# Patient Record
Sex: MALE | Race: WHITE | Employment: UNEMPLOYED | URBAN - NONMETROPOLITAN AREA
[De-identification: names, ages, dates, MRNs, and addresses within clinical notes are randomized per-mention and may not be internally consistent; named-entity substitution may affect disease eponyms.]

---

## 2017-12-31 ENCOUNTER — HOSPITAL ENCOUNTER (EMERGENCY)
Age: 57
Discharge: HOME | End: 2017-12-31
Attending: EMERGENCY MEDICINE
Payer: MEDICAID

## 2017-12-31 VITALS
SYSTOLIC BLOOD PRESSURE: 137 MMHG | OXYGEN SATURATION: 98 % | HEART RATE: 79 BPM | RESPIRATION RATE: 18 BRPM | DIASTOLIC BLOOD PRESSURE: 87 MMHG | TEMPERATURE: 98.8 F

## 2017-12-31 DIAGNOSIS — T50.905A ADVERSE EFFECT OF DRUG, INITIAL ENCOUNTER: Primary | ICD-10-CM

## 2017-12-31 DIAGNOSIS — L03.113 CELLULITIS OF RIGHT UPPER EXTREMITY: ICD-10-CM

## 2017-12-31 PROCEDURE — 6370000000 HC RX 637 (ALT 250 FOR IP): Performed by: EMERGENCY MEDICINE

## 2017-12-31 PROCEDURE — 99282 EMERGENCY DEPT VISIT SF MDM: CPT

## 2017-12-31 RX ORDER — CEPHALEXIN 500 MG/1
500 CAPSULE ORAL 4 TIMES DAILY
Qty: 28 CAPSULE | Refills: 0 | Status: SHIPPED | OUTPATIENT
Start: 2017-12-31 | End: 2018-01-07

## 2017-12-31 RX ORDER — CEPHALEXIN 500 MG/1
500 CAPSULE ORAL ONCE
Status: COMPLETED | OUTPATIENT
Start: 2017-12-31 | End: 2017-12-31

## 2017-12-31 RX ADMIN — CEPHALEXIN 500 MG: 500 CAPSULE ORAL at 20:46

## 2018-01-01 NOTE — ED PROVIDER NOTES
eMERGENCY dEPARTMENT eNCOUnter      [facility]    Chief Complaint   Patient presents with    Rash       HPI    Dereck Dey is a 59 y.o. male who presents  Above-noted complaint. Patient presents with rash. Subacute get some redness but he states he scratch or cut himself on the right arm. Subsequently got infected and red. He subsequently no placed on Bactrim. Feels like the arms don't look better but now a scattered rash and gets some itching all over. He tends to be nervous although does describe some rash. PAST MEDICAL HISTORY    Past Medical History:   Diagnosis Date    Angina at rest [facility])     intermittent    Anxiety     Anxiety     Asperger's disorder     Asthma     Cervical radiculopathy     Depression     Depression     Gall stones     GERD (gastroesophageal reflux disease)     Hyperlipidemia     Hypertension     Seizures (HCC)     Tremor        SURGICAL HISTORY    Past Surgical History:   Procedure Laterality Date    ABDOMEN SURGERY      CARDIAC SURGERY      Heart Cath    CHOLECYSTECTOMY  2002    CHOLECYSTECTOMY, LAPAROSCOPIC      COLONOSCOPY      COLONOSCOPY      DILATATION, ESOPHAGUS      ENDOSCOPY, COLON, DIAGNOSTIC      ESOPHAGEAL DILATATION      KNEE ARTHROSCOPY Left 1984    NECK SURGERY  1996    plate in neck and fusion    TURP  2/11/2016    UPPER GASTROINTESTINAL ENDOSCOPY         CURRENT MEDICATIONS    Current Outpatient Rx   Medication Sig Dispense Refill    cephALEXin (KEFLEX) 500 MG capsule Take 1 capsule by mouth 4 times daily for 7 days 28 capsule 0    HYDROcodone-acetaminophen (NORCO) 5-325 MG per tablet Take 1 tablet by mouth every 6 hours as needed for Pain .       naproxen (NAPROSYN) 500 MG tablet Take 1 tablet by mouth 2 times daily 30 tablet 0    Mirabegron ER (MYRBETRIQ) 25 MG TB24 Take 1 tablet by mouth daily 30 tablet 6    aspirin 81 MG tablet Take 81 mg by mouth daily      clopidogrel (PLAVIX) 75 MG tablet Take 75 mg by mouth daily Keflex over-the-counter Benadryl for itching    FINAL IMPRESSION    1. Adverse effect of drug, initial encounter    2.  Cellulitis of right upper extremity         PATIENT REFERRED TO:  Cali Francisco MD  [address] [facility], [place]    Call   For evaluation      DISCHARGE MEDICATIONS:  New Prescriptions    CEPHALEXIN (KEFLEX) 500 MG CAPSULE    Take 1 capsule by mouth 4 times daily for 7 days          Nicholas Pierce MD  12/31/17 2024

## 2018-07-30 ENCOUNTER — HOSPITAL ENCOUNTER (OUTPATIENT)
Age: 58
Discharge: HOME | End: 2018-07-30
Payer: MEDICAID

## 2018-07-30 ENCOUNTER — HOSPITAL ENCOUNTER (OUTPATIENT)
Dept: MRI IMAGING | Age: 58
Discharge: HOME | End: 2018-07-30
Payer: MEDICAID

## 2018-07-30 DIAGNOSIS — M47.812 OSTEOARTHRITIS OF CERVICAL SPINE, UNSPECIFIED SPINAL OSTEOARTHRITIS COMPLICATION STATUS: ICD-10-CM

## 2018-07-30 PROCEDURE — 72141 MRI NECK SPINE W/O DYE: CPT

## 2018-12-23 ENCOUNTER — HOSPITAL ENCOUNTER (EMERGENCY)
Age: 58
Discharge: HOME | End: 2018-12-23
Attending: FAMILY MEDICINE
Payer: MEDICAID

## 2018-12-23 ENCOUNTER — APPOINTMENT (OUTPATIENT)
Dept: GENERAL RADIOLOGY | Age: 58
End: 2018-12-23
Payer: MEDICAID

## 2018-12-23 VITALS
RESPIRATION RATE: 18 BRPM | HEART RATE: 102 BPM | SYSTOLIC BLOOD PRESSURE: 148 MMHG | TEMPERATURE: 99 F | DIASTOLIC BLOOD PRESSURE: 74 MMHG | OXYGEN SATURATION: 98 % | BODY MASS INDEX: 25.2 KG/M2 | HEIGHT: 71 IN | WEIGHT: 180 LBS

## 2018-12-23 DIAGNOSIS — J40 BRONCHITIS: Primary | ICD-10-CM

## 2018-12-23 PROCEDURE — 71046 X-RAY EXAM CHEST 2 VIEWS: CPT

## 2018-12-23 PROCEDURE — 99283 EMERGENCY DEPT VISIT LOW MDM: CPT

## 2018-12-23 RX ORDER — PREDNISONE 10 MG/1
TABLET ORAL
Qty: 20 TABLET | Refills: 0 | Status: SHIPPED | OUTPATIENT
Start: 2018-12-23 | End: 2019-01-02

## 2018-12-23 RX ORDER — AZITHROMYCIN 250 MG/1
TABLET, FILM COATED ORAL
Qty: 1 PACKET | Refills: 0 | Status: SHIPPED | OUTPATIENT
Start: 2018-12-23 | End: 2019-01-02

## 2018-12-24 ASSESSMENT — ENCOUNTER SYMPTOMS
SORE THROAT: 0
NAUSEA: 0
EYE DISCHARGE: 0
ABDOMINAL PAIN: 0
CHEST TIGHTNESS: 0
SINUS PRESSURE: 0
BACK PAIN: 0
DIARRHEA: 0
STRIDOR: 0
SHORTNESS OF BREATH: 0
EYE PAIN: 0
VOMITING: 0
RHINORRHEA: 0
ABDOMINAL DISTENTION: 0
EYE REDNESS: 0
COUGH: 1
WHEEZING: 1
PHOTOPHOBIA: 0
CONSTIPATION: 0

## 2018-12-24 NOTE — ED PROVIDER NOTES
cranial nerve deficit. Coordination normal.   Skin: Skin is warm and dry. No rash noted. He is not diaphoretic. Psychiatric: He has a normal mood and affect. Judgment and thought content normal.   Nursing note and vitals reviewed. DIFFERENTIAL DIAGNOSIS:   Bronchitis,pneumonia,copd exacerbation,    DIAGNOSTIC RESULTS     EKG: All EKG's are interpreted by the Emergency Department Physician who either signs or Co-signs this chart in the absence of a cardiologist.      RADIOLOGY: non-plain film images(s) such as CT, Ultrasound and MRI are read by the radiologist.  Imaging Results         XR CHEST STANDARD (2 VW) (Final result)   Result time 12/23/18 11:15:34   Final result by Mahin Roberts MD (12/23/18 11:15:34)                Impression:    No acute disease. **This report has been created using voice recognition software. It may contain minor errors which are inherent in voice recognition technology. **    Final report electronically signed by Dr. Shanika Packer on 12/23/2018 11:15 AM            Narrative:    PROCEDURE: XR CHEST (2 VW)    CLINICAL INFORMATION: cough, . COMPARISON: Multiple previous most recent 12/22/2015    TECHNIQUE: PA and lateral views the chest.    FINDINGS: Heart size is normal. Previous cervical spine surgery. Mild S-shaped thoracolumbar scoliosis. No pleural effusions. No acute infiltrate. Mild compression of T11 appears stable. LABS:   Labs Reviewed - No data to display    EMERGENCY DEPARTMENT COURSE:   Vitals:    Vitals:    12/23/18 1015   BP: (!) 148/74   Pulse: 102   Resp: 18   Temp: 99 °F (37.2 °C)   TempSrc: Oral   SpO2: 98%   Weight: 180 lb (81.6 kg)   Height: 5' 11\" (1.803 m)     On exam lungs clear. Chest x ray shows no acute cardiopulmonary processes. Patient notes cough. Will cover with steroids and have patient increase nebulizer treatments at home. Will also add Zithromax secondary to suggestion of copd history.  Care instructions

## 2019-02-07 ENCOUNTER — HOSPITAL ENCOUNTER (OUTPATIENT)
Age: 59
Discharge: HOME | End: 2019-02-07
Payer: MEDICAID

## 2019-02-07 LAB
ALBUMIN SERPL-MCNC: 4.5 G/DL (ref 3.5–5.1)
ALP BLD-CCNC: 100 U/L (ref 38–126)
ALT SERPL-CCNC: 14 U/L (ref 11–66)
ANION GAP SERPL CALCULATED.3IONS-SCNC: 11 MEQ/L (ref 8–16)
AST SERPL-CCNC: 18 U/L (ref 5–40)
BASOPHILS # BLD: 1.1 %
BASOPHILS ABSOLUTE: 0.1 THOU/MM3 (ref 0–0.1)
BILIRUB SERPL-MCNC: 0.4 MG/DL (ref 0.3–1.2)
BILIRUBIN DIRECT: < 0.2 MG/DL (ref 0–0.3)
BUN BLDV-MCNC: 19 MG/DL (ref 7–22)
CALCIUM SERPL-MCNC: 8.9 MG/DL (ref 8.5–10.5)
CHLORIDE BLD-SCNC: 108 MEQ/L (ref 98–111)
CHOLESTEROL, FASTING: 155 MG/DL (ref 100–199)
CO2: 23 MEQ/L (ref 23–33)
CREAT SERPL-MCNC: 1.2 MG/DL (ref 0.4–1.2)
EOSINOPHIL # BLD: 6.7 %
EOSINOPHILS ABSOLUTE: 0.3 THOU/MM3 (ref 0–0.4)
ERYTHROCYTE [DISTWIDTH] IN BLOOD BY AUTOMATED COUNT: 12.9 % (ref 11.5–14.5)
ERYTHROCYTE [DISTWIDTH] IN BLOOD BY AUTOMATED COUNT: 45.2 FL (ref 35–45)
GFR SERPL CREATININE-BSD FRML MDRD: 62 ML/MIN/1.73M2
GLUCOSE BLD-MCNC: 113 MG/DL (ref 70–108)
HCT VFR BLD CALC: 40.7 % (ref 42–52)
HDLC SERPL-MCNC: 43 MG/DL
HEMOGLOBIN: 12.8 GM/DL (ref 14–18)
IMMATURE GRANS (ABS): 0.01 THOU/MM3 (ref 0–0.07)
IMMATURE GRANULOCYTES: 0.2 %
LDL CHOLESTEROL CALCULATED: 82 MG/DL
LYMPHOCYTES # BLD: 39.1 %
LYMPHOCYTES ABSOLUTE: 1.8 THOU/MM3 (ref 1–4.8)
MCH RBC QN AUTO: 30.2 PG (ref 26–33)
MCHC RBC AUTO-ENTMCNC: 31.4 GM/DL (ref 32.2–35.5)
MCV RBC AUTO: 96 FL (ref 80–94)
MONOCYTES # BLD: 10.5 %
MONOCYTES ABSOLUTE: 0.5 THOU/MM3 (ref 0.4–1.3)
NUCLEATED RED BLOOD CELLS: 0 /100 WBC
PLATELET # BLD: 221 THOU/MM3 (ref 130–400)
PMV BLD AUTO: 11.2 FL (ref 9.4–12.4)
POTASSIUM SERPL-SCNC: 4.4 MEQ/L (ref 3.5–5.2)
RBC # BLD: 4.24 MILL/MM3 (ref 4.7–6.1)
SEG NEUTROPHILS: 42.4 %
SEGMENTED NEUTROPHILS ABSOLUTE COUNT: 2 THOU/MM3 (ref 1.8–7.7)
SODIUM BLD-SCNC: 142 MEQ/L (ref 135–145)
TOTAL PROTEIN: 7.2 G/DL (ref 6.1–8)
TRIGLYCERIDE, FASTING: 152 MG/DL (ref 0–199)
WBC # BLD: 4.7 THOU/MM3 (ref 4.8–10.8)

## 2019-02-07 PROCEDURE — 82248 BILIRUBIN DIRECT: CPT

## 2019-02-07 PROCEDURE — 85025 COMPLETE CBC W/AUTO DIFF WBC: CPT

## 2019-02-07 PROCEDURE — 80053 COMPREHEN METABOLIC PANEL: CPT

## 2019-02-07 PROCEDURE — 36415 COLL VENOUS BLD VENIPUNCTURE: CPT

## 2019-02-07 PROCEDURE — 80061 LIPID PANEL: CPT

## 2019-09-30 ENCOUNTER — HOSPITAL ENCOUNTER (EMERGENCY)
Age: 59
Discharge: HOME | End: 2019-09-30
Attending: EMERGENCY MEDICINE
Payer: MEDICAID

## 2019-09-30 ENCOUNTER — APPOINTMENT (OUTPATIENT)
Dept: GENERAL RADIOLOGY | Age: 59
End: 2019-09-30
Payer: MEDICAID

## 2019-09-30 VITALS
BODY MASS INDEX: 27.44 KG/M2 | HEART RATE: 59 BPM | TEMPERATURE: 97.2 F | OXYGEN SATURATION: 99 % | HEIGHT: 71 IN | WEIGHT: 196 LBS | RESPIRATION RATE: 16 BRPM | DIASTOLIC BLOOD PRESSURE: 79 MMHG | SYSTOLIC BLOOD PRESSURE: 131 MMHG

## 2019-09-30 DIAGNOSIS — R07.89 LEFT-SIDED CHEST WALL PAIN: Primary | ICD-10-CM

## 2019-09-30 LAB
ALBUMIN SERPL-MCNC: 3.9 GM/DL (ref 3.4–5)
ALP BLD-CCNC: 112 U/L (ref 46–116)
ALT SERPL-CCNC: 22 U/L (ref 14–63)
ANION GAP: 11 MEQ/L (ref 8–16)
AST SERPL-CCNC: 15 U/L (ref 15–37)
BASOPHILS # BLD: 0.4 % (ref 0–3)
BILIRUB SERPL-MCNC: 0.3 MG/DL (ref 0.2–1)
BUN BLDV-MCNC: 22 MG/DL (ref 7–18)
CHLORIDE BLD-SCNC: 106 MEQ/L (ref 98–107)
CO2: 24 MEQ/L (ref 21–32)
CREAT SERPL-MCNC: 1.2 MG/DL (ref 0.6–1.3)
EKG ATRIAL RATE: 56 BPM
EKG P AXIS: 44 DEGREES
EKG P-R INTERVAL: 148 MS
EKG Q-T INTERVAL: 492 MS
EKG QRS DURATION: 132 MS
EKG QTC CALCULATION (BAZETT): 474 MS
EKG R AXIS: -3 DEGREES
EKG T AXIS: 35 DEGREES
EKG VENTRICULAR RATE: 56 BPM
EOSINOPHILS RELATIVE PERCENT: 6.7 % (ref 0–4)
GFR, ESTIMATED: 65 ML/MIN/1.73M2
GLUCOSE BLD-MCNC: 134 MG/DL (ref 74–106)
HCT VFR BLD CALC: 37.5 % (ref 42–52)
HEMOGLOBIN: 12.6 GM/DL (ref 14–18)
LYMPHOCYTES # BLD: 38.1 % (ref 15–47)
MCH RBC QN AUTO: 30.7 PG (ref 27–31)
MCHC RBC AUTO-ENTMCNC: 33.6 GM/DL (ref 33–37)
MCV RBC AUTO: 91.4 FL (ref 80–94)
MONOCYTES: 9.4 % (ref 0–12)
PDW BLD-RTO: 13.9 % (ref 11.5–14.5)
PLATELET # BLD: 216 THOU/MM3 (ref 130–400)
PMV BLD AUTO: 8.2 FL (ref 7.4–10.4)
POC CALCIUM: 8.8 MG/DL (ref 8.5–10.1)
POTASSIUM SERPL-SCNC: 4.1 MEQ/L (ref 3.5–5.1)
RBC # BLD: 4.11 MILL/MM3 (ref 4.7–6.1)
SEDIMENTATION RATE, ERYTHROCYTE: 11 MM/HR (ref 0–10)
SEGS: 45.4 % (ref 43–75)
SODIUM BLD-SCNC: 141 MEQ/L (ref 136–145)
TOTAL PROTEIN: 7.5 GM/DL (ref 6.4–8.2)
TROPONIN I: < 0.017 NG/ML (ref 0.02–0.05)
WBC # BLD: 6.4 THOU/MM3 (ref 4.8–10.8)

## 2019-09-30 PROCEDURE — 93005 ELECTROCARDIOGRAM TRACING: CPT | Performed by: EMERGENCY MEDICINE

## 2019-09-30 PROCEDURE — 93010 ELECTROCARDIOGRAM REPORT: CPT | Performed by: INTERNAL MEDICINE

## 2019-09-30 PROCEDURE — 85651 RBC SED RATE NONAUTOMATED: CPT

## 2019-09-30 PROCEDURE — 80053 COMPREHEN METABOLIC PANEL: CPT

## 2019-09-30 PROCEDURE — 84484 ASSAY OF TROPONIN QUANT: CPT

## 2019-09-30 PROCEDURE — 6370000000 HC RX 637 (ALT 250 FOR IP): Performed by: EMERGENCY MEDICINE

## 2019-09-30 PROCEDURE — 36415 COLL VENOUS BLD VENIPUNCTURE: CPT

## 2019-09-30 PROCEDURE — 85025 COMPLETE CBC W/AUTO DIFF WBC: CPT

## 2019-09-30 PROCEDURE — 99285 EMERGENCY DEPT VISIT HI MDM: CPT

## 2019-09-30 PROCEDURE — 71046 X-RAY EXAM CHEST 2 VIEWS: CPT

## 2019-09-30 RX ORDER — HYDROCODONE BITARTRATE AND ACETAMINOPHEN 5; 325 MG/1; MG/1
1 TABLET ORAL ONCE
Status: DISCONTINUED | OUTPATIENT
Start: 2019-09-30 | End: 2019-09-30 | Stop reason: HOSPADM

## 2019-09-30 ASSESSMENT — PAIN DESCRIPTION - LOCATION
LOCATION: RIB CAGE
LOCATION: RIB CAGE

## 2019-09-30 ASSESSMENT — ENCOUNTER SYMPTOMS
NAUSEA: 0
BACK PAIN: 1
ABDOMINAL PAIN: 0
COUGH: 0
WHEEZING: 0
SORE THROAT: 0

## 2019-09-30 ASSESSMENT — PAIN SCALES - GENERAL
PAINLEVEL_OUTOF10: 8
PAINLEVEL_OUTOF10: 6

## 2019-09-30 ASSESSMENT — PAIN DESCRIPTION - ORIENTATION
ORIENTATION: LEFT
ORIENTATION: LEFT

## 2019-09-30 ASSESSMENT — PAIN DESCRIPTION - FREQUENCY
FREQUENCY: CONTINUOUS
FREQUENCY: CONTINUOUS

## 2019-09-30 ASSESSMENT — PAIN DESCRIPTION - DESCRIPTORS
DESCRIPTORS: ACHING
DESCRIPTORS: ACHING

## 2019-12-12 ENCOUNTER — HOSPITAL ENCOUNTER (EMERGENCY)
Age: 59
Discharge: HOME | End: 2019-12-12
Attending: EMERGENCY MEDICINE
Payer: MEDICAID

## 2019-12-12 ENCOUNTER — APPOINTMENT (OUTPATIENT)
Dept: GENERAL RADIOLOGY | Age: 59
End: 2019-12-12
Payer: MEDICAID

## 2019-12-12 ENCOUNTER — APPOINTMENT (OUTPATIENT)
Dept: CT IMAGING | Age: 59
End: 2019-12-12
Payer: MEDICAID

## 2019-12-12 VITALS
HEIGHT: 71 IN | WEIGHT: 190 LBS | HEART RATE: 72 BPM | SYSTOLIC BLOOD PRESSURE: 161 MMHG | BODY MASS INDEX: 26.6 KG/M2 | RESPIRATION RATE: 14 BRPM | TEMPERATURE: 98.3 F | OXYGEN SATURATION: 97 % | DIASTOLIC BLOOD PRESSURE: 76 MMHG

## 2019-12-12 DIAGNOSIS — R07.89 ATYPICAL CHEST PAIN: ICD-10-CM

## 2019-12-12 DIAGNOSIS — K52.9 ENTERITIS: Primary | ICD-10-CM

## 2019-12-12 DIAGNOSIS — R19.7 NAUSEA VOMITING AND DIARRHEA: ICD-10-CM

## 2019-12-12 DIAGNOSIS — R11.2 NAUSEA VOMITING AND DIARRHEA: ICD-10-CM

## 2019-12-12 LAB
ADENOVIRUS F 40 41 PCR: NOT DETECTED
ALBUMIN SERPL-MCNC: 3.7 G/DL (ref 3.5–5.1)
ALP BLD-CCNC: 82 U/L (ref 38–126)
ALT SERPL-CCNC: 46 U/L (ref 11–66)
ANION GAP SERPL CALCULATED.3IONS-SCNC: 14 MEQ/L (ref 8–16)
AST SERPL-CCNC: 38 U/L (ref 5–40)
ASTROVIRUS PCR: NOT DETECTED
BACTERIA: ABNORMAL /HPF
BASOPHILS # BLD: 0.6 %
BASOPHILS ABSOLUTE: 0 THOU/MM3 (ref 0–0.1)
BILIRUB SERPL-MCNC: 0.3 MG/DL (ref 0.3–1.2)
BILIRUBIN DIRECT: < 0.2 MG/DL (ref 0–0.3)
BILIRUBIN URINE: NEGATIVE
BLOOD, URINE: NEGATIVE
BUN BLDV-MCNC: 23 MG/DL (ref 7–22)
CALCIUM SERPL-MCNC: 8.7 MG/DL (ref 8.5–10.5)
CAMPYLOBACTER PCR: NOT DETECTED
CASTS 2: ABNORMAL /LPF
CASTS UA: ABNORMAL /LPF
CHARACTER, URINE: CLEAR
CHLORIDE BLD-SCNC: 109 MEQ/L (ref 98–111)
CLOSTRIDIUM DIFFICILE, PCR: NOT DETECTED
CO2: 19 MEQ/L (ref 23–33)
COLOR: YELLOW
CREAT SERPL-MCNC: 1 MG/DL (ref 0.4–1.2)
CRYPTOSPORIDIUM PCR: NOT DETECTED
CRYSTALS, UA: ABNORMAL
CYCLOSPORA CAYETANENSIS PCR: NOT DETECTED
E COLI 0157 PCR: ABNORMAL
E COLI ENTEROAGGREGATIVE PCR: NOT DETECTED
E COLI ENTEROPATHOGENIC PCR: NOT DETECTED
E COLI ENTEROTOXIGENIC PCR: NOT DETECTED
E COLI SHIGA LIKE TOXIN PCR: NOT DETECTED
E COLI SHIGELLA/ENTEROINVASIVE PCR: NOT DETECTED
E HISTOLYTICA GI FILM ARRAY: NOT DETECTED
EKG ATRIAL RATE: 95 BPM
EKG P AXIS: 56 DEGREES
EKG P-R INTERVAL: 142 MS
EKG Q-T INTERVAL: 410 MS
EKG QRS DURATION: 140 MS
EKG QTC CALCULATION (BAZETT): 515 MS
EKG R AXIS: -17 DEGREES
EKG T AXIS: 50 DEGREES
EKG VENTRICULAR RATE: 95 BPM
EOSINOPHIL # BLD: 0.8 %
EOSINOPHILS ABSOLUTE: 0.1 THOU/MM3 (ref 0–0.4)
EPITHELIAL CELLS, UA: ABNORMAL /HPF
ERYTHROCYTE [DISTWIDTH] IN BLOOD BY AUTOMATED COUNT: 12.9 % (ref 11.5–14.5)
ERYTHROCYTE [DISTWIDTH] IN BLOOD BY AUTOMATED COUNT: 45.9 FL (ref 35–45)
GFR SERPL CREATININE-BSD FRML MDRD: 76 ML/MIN/1.73M2
GIARDIA LAMBLIA PCR: NOT DETECTED
GLUCOSE BLD-MCNC: 119 MG/DL (ref 70–108)
GLUCOSE URINE: NEGATIVE MG/DL
HCT VFR BLD CALC: 42.8 % (ref 42–52)
HEMOGLOBIN: 13.7 GM/DL (ref 14–18)
IMMATURE GRANS (ABS): 0.02 THOU/MM3 (ref 0–0.07)
IMMATURE GRANULOCYTES: 0.3 %
KETONES, URINE: 15
LACTIC ACID: 1.8 MMOL/L (ref 0.5–2.2)
LEUKOCYTE ESTERASE, URINE: NEGATIVE
LIPASE: 9.5 U/L (ref 5.6–51.3)
LYMPHOCYTES # BLD: 16.1 %
LYMPHOCYTES ABSOLUTE: 1 THOU/MM3 (ref 1–4.8)
MCH RBC QN AUTO: 30.9 PG (ref 26–33)
MCHC RBC AUTO-ENTMCNC: 32 GM/DL (ref 32.2–35.5)
MCV RBC AUTO: 96.6 FL (ref 80–94)
MISCELLANEOUS 2: ABNORMAL
MONOCYTES # BLD: 10.2 %
MONOCYTES ABSOLUTE: 0.7 THOU/MM3 (ref 0.4–1.3)
NITRITE, URINE: NEGATIVE
NOROVIRUS GI GII PCR: DETECTED
NUCLEATED RED BLOOD CELLS: 0 /100 WBC
OSMOLALITY CALCULATION: 287.9 MOSMOL/KG (ref 275–300)
PH UA: 5.5 (ref 5–9)
PLATELET # BLD: 183 THOU/MM3 (ref 130–400)
PLESIOMONAS SHIGELLOIDES PCR: NOT DETECTED
PMV BLD AUTO: 10.9 FL (ref 9.4–12.4)
POTASSIUM REFLEX MAGNESIUM: 3.7 MEQ/L (ref 3.5–5.2)
PRO-BNP: 339.3 PG/ML (ref 0–900)
PROTEIN UA: 30
RBC # BLD: 4.43 MILL/MM3 (ref 4.7–6.1)
RBC URINE: ABNORMAL /HPF
RENAL EPITHELIAL, UA: ABNORMAL
ROTAVIRUS A PCR: NOT DETECTED
SALMONELLA PCR: NOT DETECTED
SAPOVIRUS PCR: NOT DETECTED
SEG NEUTROPHILS: 72 %
SEGMENTED NEUTROPHILS ABSOLUTE COUNT: 4.7 THOU/MM3 (ref 1.8–7.7)
SODIUM BLD-SCNC: 142 MEQ/L (ref 135–145)
SPECIFIC GRAVITY, URINE: 1.03 (ref 1–1.03)
TOTAL PROTEIN: 6.7 G/DL (ref 6.1–8)
TROPONIN T: < 0.01 NG/ML
UROBILINOGEN, URINE: 0.2 EU/DL (ref 0–1)
VIBRIO CHOLERAE PCR: NOT DETECTED
VIBRIO PCR: NOT DETECTED
WBC # BLD: 6.5 THOU/MM3 (ref 4.8–10.8)
WBC UA: ABNORMAL /HPF
YEAST: ABNORMAL
YERSINIA ENTEROCOLITICA PCR: NOT DETECTED

## 2019-12-12 PROCEDURE — 2709999900 HC NON-CHARGEABLE SUPPLY

## 2019-12-12 PROCEDURE — 99285 EMERGENCY DEPT VISIT HI MDM: CPT

## 2019-12-12 PROCEDURE — 80076 HEPATIC FUNCTION PANEL: CPT

## 2019-12-12 PROCEDURE — 93010 ELECTROCARDIOGRAM REPORT: CPT | Performed by: NUCLEAR MEDICINE

## 2019-12-12 PROCEDURE — 83690 ASSAY OF LIPASE: CPT

## 2019-12-12 PROCEDURE — 81001 URINALYSIS AUTO W/SCOPE: CPT

## 2019-12-12 PROCEDURE — 2580000003 HC RX 258: Performed by: EMERGENCY MEDICINE

## 2019-12-12 PROCEDURE — 80048 BASIC METABOLIC PNL TOTAL CA: CPT

## 2019-12-12 PROCEDURE — 93005 ELECTROCARDIOGRAM TRACING: CPT | Performed by: EMERGENCY MEDICINE

## 2019-12-12 PROCEDURE — 74177 CT ABD & PELVIS W/CONTRAST: CPT

## 2019-12-12 PROCEDURE — 84484 ASSAY OF TROPONIN QUANT: CPT

## 2019-12-12 PROCEDURE — 83605 ASSAY OF LACTIC ACID: CPT

## 2019-12-12 PROCEDURE — 74019 RADEX ABDOMEN 2 VIEWS: CPT

## 2019-12-12 PROCEDURE — 36415 COLL VENOUS BLD VENIPUNCTURE: CPT

## 2019-12-12 PROCEDURE — 87507 IADNA-DNA/RNA PROBE TQ 12-25: CPT

## 2019-12-12 PROCEDURE — 83880 ASSAY OF NATRIURETIC PEPTIDE: CPT

## 2019-12-12 PROCEDURE — 85025 COMPLETE CBC W/AUTO DIFF WBC: CPT

## 2019-12-12 PROCEDURE — 71045 X-RAY EXAM CHEST 1 VIEW: CPT

## 2019-12-12 PROCEDURE — 6360000004 HC RX CONTRAST MEDICATION: Performed by: EMERGENCY MEDICINE

## 2019-12-12 RX ORDER — 0.9 % SODIUM CHLORIDE 0.9 %
1000 INTRAVENOUS SOLUTION INTRAVENOUS ONCE
Status: COMPLETED | OUTPATIENT
Start: 2019-12-12 | End: 2019-12-12

## 2019-12-12 RX ORDER — PROMETHAZINE HYDROCHLORIDE 25 MG/1
25 TABLET ORAL EVERY 6 HOURS PRN
Qty: 10 TABLET | Refills: 0 | Status: SHIPPED | OUTPATIENT
Start: 2019-12-12 | End: 2019-12-15

## 2019-12-12 RX ADMIN — SODIUM CHLORIDE 1000 ML: 9 INJECTION, SOLUTION INTRAVENOUS at 11:55

## 2019-12-12 RX ADMIN — IOPAMIDOL 80 ML: 755 INJECTION, SOLUTION INTRAVENOUS at 13:09

## 2019-12-12 ASSESSMENT — PAIN DESCRIPTION - ORIENTATION: ORIENTATION: MID

## 2019-12-12 ASSESSMENT — ENCOUNTER SYMPTOMS
NAUSEA: 1
SORE THROAT: 0
SHORTNESS OF BREATH: 0
VOMITING: 1
COUGH: 0
RHINORRHEA: 0
WHEEZING: 0
EYE DISCHARGE: 0
BACK PAIN: 0
DIARRHEA: 1
ABDOMINAL PAIN: 0
EYE REDNESS: 0

## 2019-12-12 ASSESSMENT — PAIN DESCRIPTION - FREQUENCY: FREQUENCY: CONTINUOUS

## 2019-12-12 ASSESSMENT — PAIN DESCRIPTION - DESCRIPTORS: DESCRIPTORS: PRESSURE

## 2019-12-12 ASSESSMENT — PAIN SCALES - GENERAL: PAINLEVEL_OUTOF10: 1

## 2019-12-12 ASSESSMENT — PAIN DESCRIPTION - LOCATION: LOCATION: CHEST

## 2019-12-12 ASSESSMENT — PAIN DESCRIPTION - PAIN TYPE: TYPE: ACUTE PAIN

## 2020-02-07 ENCOUNTER — HOSPITAL ENCOUNTER (OUTPATIENT)
Age: 60
Discharge: HOME | End: 2020-02-07
Payer: MEDICAID

## 2020-02-07 LAB
ALBUMIN SERPL-MCNC: 4.2 G/DL (ref 3.5–5.1)
ALP BLD-CCNC: 88 U/L (ref 38–126)
ALT SERPL-CCNC: 14 U/L (ref 11–66)
ANION GAP SERPL CALCULATED.3IONS-SCNC: 9 MEQ/L (ref 8–16)
AST SERPL-CCNC: 18 U/L (ref 5–40)
BILIRUB SERPL-MCNC: 0.3 MG/DL (ref 0.3–1.2)
BILIRUBIN DIRECT: < 0.2 MG/DL (ref 0–0.3)
BUN BLDV-MCNC: 18 MG/DL (ref 7–22)
CALCIUM SERPL-MCNC: 9.2 MG/DL (ref 8.5–10.5)
CHLORIDE BLD-SCNC: 110 MEQ/L (ref 98–111)
CHOLESTEROL, FASTING: 141 MG/DL (ref 100–199)
CO2: 23 MEQ/L (ref 23–33)
CREAT SERPL-MCNC: 1.1 MG/DL (ref 0.4–1.2)
ERYTHROCYTE [DISTWIDTH] IN BLOOD BY AUTOMATED COUNT: 12.6 % (ref 11.5–14.5)
ERYTHROCYTE [DISTWIDTH] IN BLOOD BY AUTOMATED COUNT: 45.1 FL (ref 35–45)
GFR SERPL CREATININE-BSD FRML MDRD: 68 ML/MIN/1.73M2
GLUCOSE FASTING: 105 MG/DL (ref 70–108)
HCT VFR BLD CALC: 39.4 % (ref 42–52)
HDLC SERPL-MCNC: 37 MG/DL
HEMOGLOBIN: 12.4 GM/DL (ref 14–18)
LDL CHOLESTEROL CALCULATED: 67 MG/DL
MCH RBC QN AUTO: 30.9 PG (ref 26–33)
MCHC RBC AUTO-ENTMCNC: 31.5 GM/DL (ref 32.2–35.5)
MCV RBC AUTO: 98.3 FL (ref 80–94)
PLATELET # BLD: 224 THOU/MM3 (ref 130–400)
PMV BLD AUTO: 11 FL (ref 9.4–12.4)
POTASSIUM SERPL-SCNC: 4 MEQ/L (ref 3.5–5.2)
RBC # BLD: 4.01 MILL/MM3 (ref 4.7–6.1)
SODIUM BLD-SCNC: 142 MEQ/L (ref 135–145)
T4 FREE: 0.74 NG/DL (ref 0.93–1.76)
TOTAL PROTEIN: 7.4 G/DL (ref 6.1–8)
TRIGLYCERIDE, FASTING: 186 MG/DL (ref 0–199)
TSH SERPL DL<=0.05 MIU/L-ACNC: 1.59 UIU/ML (ref 0.4–4.2)
WBC # BLD: 5.6 THOU/MM3 (ref 4.8–10.8)

## 2020-02-07 PROCEDURE — 80061 LIPID PANEL: CPT

## 2020-02-07 PROCEDURE — 84443 ASSAY THYROID STIM HORMONE: CPT

## 2020-02-07 PROCEDURE — 85027 COMPLETE CBC AUTOMATED: CPT

## 2020-02-07 PROCEDURE — 84439 ASSAY OF FREE THYROXINE: CPT

## 2020-02-07 PROCEDURE — 80048 BASIC METABOLIC PNL TOTAL CA: CPT

## 2020-02-07 PROCEDURE — 36415 COLL VENOUS BLD VENIPUNCTURE: CPT

## 2020-02-07 PROCEDURE — 80076 HEPATIC FUNCTION PANEL: CPT

## 2020-06-23 ENCOUNTER — HOSPITAL ENCOUNTER (OUTPATIENT)
Age: 60
Discharge: HOME | End: 2020-06-23
Payer: MEDICAID

## 2020-06-23 PROCEDURE — U0002 COVID-19 LAB TEST NON-CDC: HCPCS

## 2020-06-24 LAB
PERFORMING LAB: NORMAL
REPORT: NORMAL
SARS-COV-2: NOT DETECTED

## 2020-06-30 ENCOUNTER — ANESTHESIA (OUTPATIENT)
Dept: ENDOSCOPY | Age: 60
End: 2020-06-30
Payer: MEDICAID

## 2020-06-30 ENCOUNTER — ANESTHESIA EVENT (OUTPATIENT)
Dept: ENDOSCOPY | Age: 60
End: 2020-06-30
Payer: MEDICAID

## 2020-06-30 ENCOUNTER — HOSPITAL ENCOUNTER (OUTPATIENT)
Age: 60
Setting detail: OUTPATIENT SURGERY
Discharge: HOME | End: 2020-06-30
Attending: INTERNAL MEDICINE | Admitting: INTERNAL MEDICINE
Payer: MEDICAID

## 2020-06-30 VITALS
OXYGEN SATURATION: 100 % | SYSTOLIC BLOOD PRESSURE: 121 MMHG | RESPIRATION RATE: 18 BRPM | DIASTOLIC BLOOD PRESSURE: 56 MMHG

## 2020-06-30 VITALS
RESPIRATION RATE: 18 BRPM | HEIGHT: 71 IN | TEMPERATURE: 97.2 F | BODY MASS INDEX: 24.47 KG/M2 | SYSTOLIC BLOOD PRESSURE: 136 MMHG | OXYGEN SATURATION: 96 % | HEART RATE: 56 BPM | DIASTOLIC BLOOD PRESSURE: 75 MMHG | WEIGHT: 174.8 LBS

## 2020-06-30 PROCEDURE — 3609008400 HC SIGMOIDOSCOPY DIAGNOSTIC: Performed by: INTERNAL MEDICINE

## 2020-06-30 PROCEDURE — 2709999900 HC NON-CHARGEABLE SUPPLY: Performed by: INTERNAL MEDICINE

## 2020-06-30 PROCEDURE — 7100000001 HC PACU RECOVERY - ADDTL 15 MIN: Performed by: INTERNAL MEDICINE

## 2020-06-30 PROCEDURE — 3700000001 HC ADD 15 MINUTES (ANESTHESIA): Performed by: INTERNAL MEDICINE

## 2020-06-30 PROCEDURE — 6360000002 HC RX W HCPCS: Performed by: REGISTERED NURSE

## 2020-06-30 PROCEDURE — 7100000000 HC PACU RECOVERY - FIRST 15 MIN: Performed by: INTERNAL MEDICINE

## 2020-06-30 PROCEDURE — 3700000000 HC ANESTHESIA ATTENDED CARE: Performed by: INTERNAL MEDICINE

## 2020-06-30 PROCEDURE — 2580000003 HC RX 258: Performed by: INTERNAL MEDICINE

## 2020-06-30 RX ORDER — SODIUM CHLORIDE 450 MG/100ML
INJECTION, SOLUTION INTRAVENOUS CONTINUOUS
Status: DISCONTINUED | OUTPATIENT
Start: 2020-06-30 | End: 2020-06-30 | Stop reason: HOSPADM

## 2020-06-30 RX ORDER — DOCUSATE SODIUM 100 MG/1
100 CAPSULE, LIQUID FILLED ORAL 2 TIMES DAILY
COMMUNITY

## 2020-06-30 RX ORDER — PROPOFOL 10 MG/ML
INJECTION, EMULSION INTRAVENOUS PRN
Status: DISCONTINUED | OUTPATIENT
Start: 2020-06-30 | End: 2020-06-30 | Stop reason: SDUPTHER

## 2020-06-30 RX ORDER — FINASTERIDE 5 MG/1
5 TABLET, FILM COATED ORAL DAILY
COMMUNITY

## 2020-06-30 RX ORDER — PROMETHAZINE HYDROCHLORIDE 25 MG/1
25 TABLET ORAL EVERY 6 HOURS PRN
Status: ON HOLD | COMMUNITY
End: 2022-04-06

## 2020-06-30 RX ORDER — LOPERAMIDE HYDROCHLORIDE 2 MG/1
2 CAPSULE ORAL PRN
COMMUNITY

## 2020-06-30 RX ORDER — HYDROXYZINE HYDROCHLORIDE 25 MG/1
25 TABLET, FILM COATED ORAL 4 TIMES DAILY PRN
COMMUNITY

## 2020-06-30 RX ORDER — DOXEPIN HYDROCHLORIDE 25 MG/1
25 CAPSULE ORAL NIGHTLY
COMMUNITY

## 2020-06-30 RX ORDER — CALCIUM CARBONATE 200(500)MG
1 TABLET,CHEWABLE ORAL PRN
COMMUNITY

## 2020-06-30 RX ORDER — SOLIFENACIN SUCCINATE 10 MG/1
10 TABLET, FILM COATED ORAL DAILY
COMMUNITY

## 2020-06-30 RX ORDER — ACETAMINOPHEN 500 MG
500 TABLET ORAL EVERY 4 HOURS PRN
COMMUNITY

## 2020-06-30 RX ORDER — TRAMADOL HYDROCHLORIDE 50 MG/1
50 TABLET ORAL EVERY 8 HOURS PRN
COMMUNITY

## 2020-06-30 RX ADMIN — PROPOFOL 220 MG: 10 INJECTION, EMULSION INTRAVENOUS at 12:34

## 2020-06-30 RX ADMIN — SODIUM CHLORIDE: 4.5 INJECTION, SOLUTION INTRAVENOUS at 11:50

## 2020-06-30 ASSESSMENT — PAIN DESCRIPTION - DESCRIPTORS: DESCRIPTORS: ACHING;DULL

## 2020-06-30 ASSESSMENT — PAIN SCALES - GENERAL
PAINLEVEL_OUTOF10: 0
PAINLEVEL_OUTOF10: 0

## 2020-06-30 ASSESSMENT — PAIN - FUNCTIONAL ASSESSMENT: PAIN_FUNCTIONAL_ASSESSMENT: 0-10

## 2020-06-30 NOTE — ANESTHESIA PRE PROCEDURE
Department of Anesthesiology  Preprocedure Note       Name:  Julio Veronica   Age:  56 y.o.  :  1960                                          MRN:  [id number]         Date:  2020      Surgeon: Marta Khan):  Marilynn Mcknight MD    Procedure: Procedure(s):  COLORECTAL CANCER SCREENING, NOT HIGH RISK    Medications prior to admission:   Prior to Admission medications    Medication Sig Start Date End Date Taking? Authorizing Provider   finasteride (PROSCAR) 5 MG tablet Take 5 mg by mouth daily   Yes Historical Provider, MD   doxepin (SINEQUAN) 25 MG capsule Take 25 mg by mouth nightly   Yes Historical Provider, MD   solifenacin (VESICARE) 10 MG tablet Take 5 mg by mouth daily   Yes Historical Provider, MD   docusate sodium (COLACE) 100 MG capsule Take 100 mg by mouth 2 times daily   Yes Historical Provider, MD   HYDROcodone-acetaminophen (NORCO) 5-325 MG per tablet Take 1 tablet by mouth every 6 hours as needed for Pain . Yes Historical Provider, MD   aspirin 81 MG tablet Take 81 mg by mouth daily   Yes Historical Provider, MD   clopidogrel (PLAVIX) 75 MG tablet Take 75 mg by mouth daily   Yes Historical Provider, MD   SYNTHROID 88 MCG tablet Take one tablet 7 days/week with extra 1/2 tablet one day/week 16  Yes Dillon Lazaro MD   lactobacillus acidophilus (FLORANEX) Take 1 tablet by mouth 3 times daily   Yes Historical Provider, MD   QUEtiapine (SEROQUEL) 25 MG tablet Take 500 mg by mouth nightly Patient takes 1 200 mg tablet + 1 300 mg tablet at bedtime   Yes Historical Provider, MD   tamsulosin (FLOMAX) 0.4 MG capsule Take 1 capsule by mouth 2 times daily 2 tablets at bedtime 8/4/15  Yes Liliana Lam, APRN - CNP   topiramate (TOPAMAX) 100 MG tablet Take 100 mg by mouth 2 times daily   Yes Historical Provider, MD   pravastatin (PRAVACHOL) 40 MG tablet   Take 40 mg by mouth nightly    Yes Historical Provider, MD   vitamin D (CHOLECALCIFEROL) 1000 UNIT TABS tablet Take 1 tablet by mouth daily. Seizure disorder ([facility] [address] G40.909    Chest pain R07.9    Dizziness R42    Chest pain at rest R07.9    Chest pain at rest R07.9    CKD (chronic kidney disease) stage 3, GFR 30-59 ml/min ([facility]) N18.3    Hypotension I95.9    Chest pain R07.9    Chest pain R07.9    Benign non-nodular prostatic hyperplasia with lower urinary tract symptoms N40.1       Past Medical History:        Diagnosis Date    Angina at rest [facility])     intermittent    Anxiety     Anxiety     Asperger's disorder     Asthma     Cervical radiculopathy     Depression     Depression     Gall stones     GERD (gastroesophageal reflux disease)     Hyperlipidemia     Hypertension     Seizures (HCC)     Tremor        Past Surgical History:        Procedure Laterality Date    ABDOMEN SURGERY      CARDIAC SURGERY      Heart Cath    CHOLECYSTECTOMY  2002    CHOLECYSTECTOMY, LAPAROSCOPIC      COLONOSCOPY      COLONOSCOPY      DILATATION, ESOPHAGUS      ENDOSCOPY, COLON, DIAGNOSTIC      ESOPHAGEAL DILATATION      KNEE ARTHROSCOPY Left 1984    NECK SURGERY  1996    plate in neck and fusion    TURP  2/11/2016    UPPER GASTROINTESTINAL ENDOSCOPY         Social History:    Social History     Tobacco Use    Smoking status: Never Smoker    Smokeless tobacco: Never Used   Substance Use Topics    Alcohol use: No     Alcohol/week: 0.0 standard drinks                                Counseling given: Not Answered      Vital Signs (Current):   Vitals:    06/30/20 1102   BP: (!) 187/82   Pulse: 63   Resp: 16   Temp: 36.2 °C (97.1 °F)   TempSrc: Temporal   SpO2: 98%   Weight: 174 lb 12.8 oz (79.3 kg)   Height: 5' 11\" (1.803 m)                                              BP Readings from Last 3 Encounters:   06/30/20 (!) 187/82   12/12/19 (!) 161/76   09/30/19 131/79       NPO Status: Time of last liquid consumption: 0330                        Time of last solid consumption: 1200                        Date of last liquid consumption: 06/30/20                        Date of last solid food consumption: 06/29/20    BMI:   Wt Readings from Last 3 Encounters:   06/30/20 174 lb 12.8 oz (79.3 kg)   12/12/19 190 lb (86.2 kg)   09/30/19 196 lb (88.9 kg)     Body mass index is 24.38 kg/m². CBC:   Lab Results   Component Value Date    WBC 5.6 02/07/2020    RBC 4.01 02/07/2020    RBC 4.35 11/16/2011    HGB 12.4 02/07/2020    HCT 39.4 02/07/2020    MCV 98.3 02/07/2020    RDW 13.9 09/30/2019     02/07/2020       CMP:   Lab Results   Component Value Date     02/07/2020    K 4.0 02/07/2020    K 3.7 12/12/2019     02/07/2020    CO2 23 02/07/2020    BUN 18 02/07/2020    CREATININE 1.1 02/07/2020    LABGLOM 68 02/07/2020    GLUCOSE 119 12/12/2019    GLUCOSE 99 11/16/2011    PROT 7.4 02/07/2020    CALCIUM 9.2 02/07/2020    BILITOT 0.3 02/07/2020    ALKPHOS 88 02/07/2020    AST 18 02/07/2020    ALT 14 02/07/2020       POC Tests: No results for input(s): POCGLU, POCNA, POCK, POCCL, POCBUN, POCHEMO, POCHCT in the last 72 hours.     Coags:   Lab Results   Component Value Date    PROTIME 1.11 09/07/2011    INR 1.00 05/29/2014    APTT 32.0 05/29/2014       HCG (If Applicable): No results found for: PREGTESTUR, PREGSERUM, HCG, HCGQUANT     ABGs: No results found for: PHART, PO2ART, XLN9MOR, GAB7GKZ, BEART, P2PFRMNS     Type & Screen (If Applicable):  Lab Results   Component Value Date    LABRH POS 08/03/2013       Drug/Infectious Status (If Applicable):  No results found for: HIV, HEPCAB    COVID-19 Screening (If Applicable):   Lab Results   Component Value Date    COVID19 NOT DETECTED 06/23/2020         Anesthesia Evaluation  Patient summary reviewed  Airway: Mallampati: III  TM distance: >3 FB   Neck ROM: full  Mouth opening: > = 3 FB Dental:    (+) edentulous      Pulmonary:normal exam  breath sounds clear to auscultation  (+) asthma:                            Cardiovascular:    (+) hypertension:, angina: no interval change, CAD:,       ECG

## 2020-06-30 NOTE — PROGRESS NOTES
Drowsy. Arouses to verbal stimuli. Dr Baker Medina in to speak with pt and sister regarding findings and plan of care.

## 2020-06-30 NOTE — H&P
Start Date End Date Taking? Authorizing Provider   finasteride (PROSCAR) 5 MG tablet Take 5 mg by mouth daily   Yes Historical Provider, MD   doxepin (SINEQUAN) 25 MG capsule Take 25 mg by mouth nightly   Yes Historical Provider, MD   solifenacin (VESICARE) 10 MG tablet Take 5 mg by mouth daily   Yes Historical Provider, MD   docusate sodium (COLACE) 100 MG capsule Take 100 mg by mouth 2 times daily   Yes Historical Provider, MD   HYDROcodone-acetaminophen (NORCO) 5-325 MG per tablet Take 1 tablet by mouth every 6 hours as needed for Pain . Yes Historical Provider, MD   aspirin 81 MG tablet Take 81 mg by mouth daily   Yes Historical Provider, MD   clopidogrel (PLAVIX) 75 MG tablet Take 75 mg by mouth daily   Yes Historical Provider, MD   SYNTHROID 88 MCG tablet Take one tablet 7 days/week with extra 1/2 tablet one day/week 1/19/16  Yes Alex Fernández MD   lactobacillus acidophilus (FLORANEX) Take 1 tablet by mouth 3 times daily   Yes Historical Provider, MD   QUEtiapine (SEROQUEL) 25 MG tablet Take 500 mg by mouth nightly Patient takes 1 200 mg tablet + 1 300 mg tablet at bedtime   Yes Historical Provider, MD   tamsulosin (FLOMAX) 0.4 MG capsule Take 1 capsule by mouth 2 times daily 2 tablets at bedtime 8/4/15  Yes ELLIE Bowman - CNP   topiramate (TOPAMAX) 100 MG tablet Take 100 mg by mouth 2 times daily   Yes Historical Provider, MD   pravastatin (PRAVACHOL) 40 MG tablet   Take 40 mg by mouth nightly    Yes Historical Provider, MD   vitamin D (CHOLECALCIFEROL) 1000 UNIT TABS tablet Take 1 tablet by mouth daily. 1/7/13  Yes Jordin Schwartz MD   citalopram (CELEXA) 40 MG tablet Take 1 tablet by mouth daily. 1/7/13  Yes Jordin Schwartz MD   isosorbide mononitrate (IMDUR) 30 MG CR tablet Take 0.5 tablets by mouth daily. 1/7/13  Yes Jordin Schwartz MD   polyethylene glycol [facility]) packet Take 17 g by mouth 2 times daily.  12/27/12  Yes ELLIE Manzanares - CNP for Muscle spasms    Historical Provider, MD   zolpidem (AMBIEN CR) 12.5 MG CR tablet Take 10 mg by mouth nightly as needed for Sleep. Historical Provider, MD   OLANZapine (ZYPREXA) 5 MG tablet Take 5 mg by mouth nightly    Historical Provider, MD   ondansetron (ZOFRAN) 4 MG tablet   Take 8 mg by mouth every 12 hours as needed     Historical Provider, MD   nitroGLYCERIN (NITROSTAT) 0.4 MG SL tablet Place 1 tablet under the tongue every 5 minutes as needed for Chest pain. 1/7/13   Bernell Kehr, MD     Additional information:       PHYSICAL:   Heart:  [x]Regular rate and rhythm  []Other:    Lungs:  [x]Clear    []Other:    Abdomen: [x]Soft    []Other:    Mental Status: [x]Alert & Oriented  []Other:      VITAL SIGNS   Patient Vitals for the past 24 hrs:   BP Temp Temp src Pulse Resp SpO2 Height Weight   06/30/20 1102 (!) 187/82 97.1 °F (36.2 °C) Temporal 63 16 98 % 5' 11\" (1.803 m) 174 lb 12.8 oz (79.3 kg)       PLANNED PROCEDURE  []EGD  [x]Colonoscopy []Flex Sigmoid  []ERCP []EUS   []Cystoscopy  [] CATH [] BRONCH   Consent: I have discussed with the patient and/or the patient representative the indication, alternatives, and the possible risks and/or complications of the planned procedure and the anesthesia methods. The patient and/or patient representative appear to understand and agree to proceed. SEDATION  Planned agent:[]Midazolam []Meperidine []Sublimaze []Morphine  []Diazepam [x]Propofol  []Other:     ASA Classification: Class 3 - A patient with severe systemic disease that limits activity but is not incapacitating    Airway Assessment: normal    Monitoring and Safety: The patient will be placed on a cardiac monitor and vital signs, pulse oximetry and level of consciousness will be continuously evaluated throughout the procedure. The patient will be closely monitored until recovery from the medications is complete and the patient has returned to baseline status.   Respiratory therapy will be on standby during the procedure. [x]Pre-procedure diagnostic studies complete and results available. Comment:    [x]Previous sedation/anesthesia experiences assessed. Comment:    [x]The patient is an appropriate candidate to undergo the planned procedure sedation and anesthesia. (Refer to nursing sedation/analgesia documentation record)  [x]Formulation and discussion of sedation/procedure plan, risks, and expectations with patient and/or responsible adult completed. [x]Patient examined immediately prior to the procedure.  (Refer to nursing sedation/analgesia documentation record)    Ramin Singh MD   Electronically signed 6/30/2020 at 12:24 PM

## 2020-07-01 NOTE — OP NOTE
Marianne Oneill M.D.    D: 06/30/2020 12:57:26       T: 06/30/2020 14:05:35     [email or web address]  Job#: [id number]     Doc#: [id number]    CC:

## 2021-04-27 LAB
ALBUMIN SERPL-MCNC: 4.2 GM/DL (ref 3.5–5)
ALP BLD-CCNC: 70 IU/L (ref 41–137)
ALT SERPL-CCNC: 13 IU/L (ref 10–40)
ANION GAP SERPL CALCULATED.3IONS-SCNC: 4 MMOL/L (ref 4–12)
AST SERPL-CCNC: 17 IU/L (ref 15–41)
BILIRUB SERPL-MCNC: 0.5 MG/DL (ref 0.2–1)
BILIRUBIN DIRECT: 0.1 MG/DL (ref 0.1–0.2)
BUN BLDV-MCNC: 29 MG/DL (ref 7–20)
CALCIUM SERPL-MCNC: 9.1 MG/DL (ref 8.8–10.5)
CHLORIDE BLD-SCNC: 111 MEQ/L (ref 101–111)
CHOLESTEROL/HDL RELATIVE RISK: 2.8 (ref 4–5)
CHOLESTEROL: 137 MG/DL
CO2: 23 MEQ/L (ref 21–32)
CREAT SERPL-MCNC: 1.21 MG/DL (ref 0.6–1.3)
CREATININE CLEARANCE: >60
DIRECT-LDL / HDL RISK: 1.6
GLUCOSE: 130 MG/DL (ref 70–110)
HCT VFR BLD CALC: 37.1 % (ref 40–49)
HDLC SERPL-MCNC: 48 MG/DL
HEMOGLOBIN: 12.5 GM/DL (ref 13.5–16.5)
LDL CHOLESTEROL DIRECT: 79 MG/DL
MCH RBC QN AUTO: 31.4 PG (ref 27.5–33)
MCHC RBC AUTO-ENTMCNC: 33.7 GM/DL (ref 33–36)
MCV RBC AUTO: 93.2 CU MIC (ref 80–97)
PDW BLD-RTO: 13.5 % (ref 12–16)
PLATELET # BLD: 204 TH/CMM (ref 150–400)
POTASSIUM SERPL-SCNC: 4.2 MEQ/L (ref 3.6–5)
RBC # BLD: 3.98 MIL/CMM (ref 4.5–6)
SODIUM BLD-SCNC: 138 MEQ/L (ref 135–145)
TOTAL PROTEIN: 7.2 G/DL (ref 6.2–8)
TRIGL SERPL-MCNC: 81 MG/DL
VLDLC SERPL CALC-MCNC: 16 MG/DL
WBC # BLD: 5.5 TH/CMM (ref 4.4–10.5)

## 2021-06-10 ENCOUNTER — APPOINTMENT (OUTPATIENT)
Dept: GENERAL RADIOLOGY | Age: 61
End: 2021-06-10
Payer: MEDICAID

## 2021-06-10 ENCOUNTER — HOSPITAL ENCOUNTER (EMERGENCY)
Age: 61
Discharge: OTHER INSTITUTION | End: 2021-06-10
Payer: MEDICAID

## 2021-06-10 VITALS
WEIGHT: 174 LBS | TEMPERATURE: 98.3 F | BODY MASS INDEX: 24.27 KG/M2 | RESPIRATION RATE: 18 BRPM | SYSTOLIC BLOOD PRESSURE: 161 MMHG | HEART RATE: 53 BPM | DIASTOLIC BLOOD PRESSURE: 73 MMHG | OXYGEN SATURATION: 97 %

## 2021-06-10 DIAGNOSIS — R07.9 CHEST PAIN, UNSPECIFIED TYPE: Primary | ICD-10-CM

## 2021-06-10 LAB
ALBUMIN SERPL-MCNC: 4.1 G/DL (ref 3.5–5.1)
ALP BLD-CCNC: 66 U/L (ref 38–126)
ALT SERPL-CCNC: 13 U/L (ref 11–66)
ANION GAP SERPL CALCULATED.3IONS-SCNC: 8 MEQ/L (ref 8–16)
AST SERPL-CCNC: 21 U/L (ref 5–40)
BASOPHILS # BLD: 1.1 %
BASOPHILS ABSOLUTE: 0.1 THOU/MM3 (ref 0–0.1)
BILIRUB SERPL-MCNC: 0.2 MG/DL (ref 0.3–1.2)
BUN BLDV-MCNC: 27 MG/DL (ref 7–22)
CALCIUM SERPL-MCNC: 9 MG/DL (ref 8.5–10.5)
CHLORIDE BLD-SCNC: 111 MEQ/L (ref 98–111)
CO2: 21 MEQ/L (ref 23–33)
CREAT SERPL-MCNC: 1.2 MG/DL (ref 0.4–1.2)
EKG ATRIAL RATE: 55 BPM
EKG P AXIS: 54 DEGREES
EKG P-R INTERVAL: 144 MS
EKG Q-T INTERVAL: 486 MS
EKG QRS DURATION: 128 MS
EKG QTC CALCULATION (BAZETT): 464 MS
EKG R AXIS: 17 DEGREES
EKG T AXIS: 45 DEGREES
EKG VENTRICULAR RATE: 55 BPM
EOSINOPHIL # BLD: 5.1 %
EOSINOPHILS ABSOLUTE: 0.3 THOU/MM3 (ref 0–0.4)
ERYTHROCYTE [DISTWIDTH] IN BLOOD BY AUTOMATED COUNT: 12.5 % (ref 11.5–14.5)
ERYTHROCYTE [DISTWIDTH] IN BLOOD BY AUTOMATED COUNT: 44.3 FL (ref 35–45)
GFR SERPL CREATININE-BSD FRML MDRD: 61 ML/MIN/1.73M2
GLUCOSE BLD-MCNC: 95 MG/DL (ref 70–108)
HCT VFR BLD CALC: 35 % (ref 42–52)
HEMOGLOBIN: 11.2 GM/DL (ref 14–18)
IMMATURE GRANS (ABS): 0.01 THOU/MM3 (ref 0–0.07)
IMMATURE GRANULOCYTES: 0.2 %
LYMPHOCYTES # BLD: 40.7 %
LYMPHOCYTES ABSOLUTE: 2.2 THOU/MM3 (ref 1–4.8)
MCH RBC QN AUTO: 31.2 PG (ref 26–33)
MCHC RBC AUTO-ENTMCNC: 32 GM/DL (ref 32.2–35.5)
MCV RBC AUTO: 97.5 FL (ref 80–94)
MONOCYTES # BLD: 10.8 %
MONOCYTES ABSOLUTE: 0.6 THOU/MM3 (ref 0.4–1.3)
NUCLEATED RED BLOOD CELLS: 0 /100 WBC
OSMOLALITY CALCULATION: 284.3 MOSMOL/KG (ref 275–300)
PLATELET # BLD: 172 THOU/MM3 (ref 130–400)
PMV BLD AUTO: 10.7 FL (ref 9.4–12.4)
POTASSIUM REFLEX MAGNESIUM: 4.2 MEQ/L (ref 3.5–5.2)
RBC # BLD: 3.59 MILL/MM3 (ref 4.7–6.1)
SEG NEUTROPHILS: 42.1 %
SEGMENTED NEUTROPHILS ABSOLUTE COUNT: 2.2 THOU/MM3 (ref 1.8–7.7)
SODIUM BLD-SCNC: 140 MEQ/L (ref 135–145)
TOTAL PROTEIN: 6.8 G/DL (ref 6.1–8)
TROPONIN T: < 0.01 NG/ML
TROPONIN T: < 0.01 NG/ML
WBC # BLD: 5.3 THOU/MM3 (ref 4.8–10.8)

## 2021-06-10 PROCEDURE — 71045 X-RAY EXAM CHEST 1 VIEW: CPT

## 2021-06-10 PROCEDURE — 36415 COLL VENOUS BLD VENIPUNCTURE: CPT

## 2021-06-10 PROCEDURE — 85025 COMPLETE CBC W/AUTO DIFF WBC: CPT

## 2021-06-10 PROCEDURE — 93010 ELECTROCARDIOGRAM REPORT: CPT | Performed by: NUCLEAR MEDICINE

## 2021-06-10 PROCEDURE — 80053 COMPREHEN METABOLIC PANEL: CPT

## 2021-06-10 PROCEDURE — 93005 ELECTROCARDIOGRAM TRACING: CPT | Performed by: PHYSICIAN ASSISTANT

## 2021-06-10 PROCEDURE — 84484 ASSAY OF TROPONIN QUANT: CPT

## 2021-06-10 PROCEDURE — 99284 EMERGENCY DEPT VISIT MOD MDM: CPT

## 2021-06-10 RX ORDER — NITROGLYCERIN 0.4 MG/1
0.4 TABLET SUBLINGUAL EVERY 5 MIN PRN
Status: DISCONTINUED | OUTPATIENT
Start: 2021-06-10 | End: 2021-06-10 | Stop reason: HOSPADM

## 2021-06-10 ASSESSMENT — ENCOUNTER SYMPTOMS
SINUS PAIN: 0
DIARRHEA: 0
COUGH: 1
VOMITING: 0
CONSTIPATION: 0
NAUSEA: 0
CHEST TIGHTNESS: 1
BACK PAIN: 0
WHEEZING: 0
SHORTNESS OF BREATH: 1
EYE PAIN: 0
ABDOMINAL PAIN: 0
ABDOMINAL DISTENTION: 0
EYE DISCHARGE: 0

## 2021-06-10 NOTE — ED PROVIDER NOTES
Positive for chest pain. Negative for palpitations and leg swelling. Gastrointestinal: Negative for abdominal distention, abdominal pain, constipation, diarrhea, nausea and vomiting. Genitourinary: Negative for dysuria, flank pain and hematuria. Musculoskeletal: Negative for arthralgias, back pain and myalgias. Neurological: Negative for dizziness, syncope, weakness, light-headedness, numbness and headaches. Psychiatric/Behavioral: Negative for confusion and decreased concentration. The patient is not nervous/anxious. PAST MEDICAL HISTORY    has a past medical history of Angina at rest [facility]), Anxiety, Anxiety, Asperger's disorder, Asthma, Cervical radiculopathy, Depression, Depression, Gall stones, GERD (gastroesophageal reflux disease), Hyperlipidemia, Hypertension, Seizures ([facility] [address] and Tremor. SURGICAL HISTORY      has a past surgical history that includes Cholecystectomy, laparoscopic; Colonoscopy; Neck surgery (1996); Knee arthroscopy (Left, 1984); Esophagus dilation; Colonoscopy; Upper gastrointestinal endoscopy; Abdomen surgery; Endoscopy, colon, diagnostic; Dilatation, esophagus; Cholecystectomy (2002); Cardiac surgery; TURP (2/11/2016); and sigmoidoscopy (Left, 6/30/2020). CURRENT MEDICATIONS       Previous Medications    ACETAMINOPHEN (TYLENOL) 500 MG TABLET    Take 500 mg by mouth every 4 hours as needed for Pain    ALBUTEROL SULFATE (PROAIR HFA IN)    Inhale 90 mcg into the lungs every 4 hours as needed    ARTIFICIAL TEAR OP    Apply to eye 1 drop in each eye 2-4 times per day    ASPIRIN 81 MG TABLET    Take 81 mg by mouth daily    BENZONATATE (TESSALON) 100 MG CAPSULE    Take 100 mg by mouth daily. CALCIUM CARBONATE (TUMS) 500 MG CHEWABLE TABLET    Take 1 tablet by mouth as needed for Heartburn    CITALOPRAM (CELEXA) 40 MG TABLET    Take 1 tablet by mouth daily.     CLOPIDOGREL (PLAVIX) 75 MG TABLET    Take 75 mg by mouth daily    CYCLOBENZAPRINE (FLEXERIL) 10 MG TABLET    Take 10 mg by mouth 2 times daily as needed for Muscle spasms    DOCUSATE SODIUM (COLACE) 100 MG CAPSULE    Take 100 mg by mouth 2 times daily    DOXEPIN (SINEQUAN) 25 MG CAPSULE    Take 25 mg by mouth nightly    DUTASTERIDE (AVODART) 0.5 MG CAPSULE    Take 1 capsule by mouth daily    FINASTERIDE (PROSCAR) 5 MG TABLET    Take 5 mg by mouth daily    HYDROCODONE-ACETAMINOPHEN (NORCO) 5-325 MG PER TABLET    Take 1 tablet by mouth every 6 hours as needed for Pain . HYDROXYZINE (ATARAX) 25 MG TABLET    Take 25 mg by mouth 4 times daily as needed for Itching    IPRATROPIUM-ALBUTEROL (DUONEB) 0.5-2.5 (3) MG/3ML SOLN NEBULIZER SOLUTION    Inhale 3 mLs into the lungs every 4 hours as needed for Shortness of Breath    ISOSORBIDE MONONITRATE (IMDUR) 30 MG CR TABLET    Take 0.5 tablets by mouth daily. LACTOBACILLUS ACIDOPHILUS (FLORANEX)    Take 1 tablet by mouth 3 times daily    LOPERAMIDE (IMODIUM) 2 MG CAPSULE    Take 2 mg by mouth as needed for Diarrhea    LORATADINE (CLARITIN) 10 MG TABLET    Take 10 mg by mouth daily. LORAZEPAM (ATIVAN) 0.5 MG TABLET    Take 0.5 mg by mouth daily as needed for Anxiety    MONTELUKAST SODIUM (SINGULAIR PO)      Take 10 mg by mouth daily     NITROGLYCERIN (NITROSTAT) 0.4 MG SL TABLET    Place 1 tablet under the tongue every 5 minutes as needed for Chest pain. OLANZAPINE (ZYPREXA) 5 MG TABLET    Take 5 mg by mouth nightly    ONDANSETRON (ZOFRAN) 4 MG TABLET      Take 8 mg by mouth every 12 hours as needed     PANTOPRAZOLE (PROTONIX) 40 MG TABLET      Take 40 mg by mouth 2 times daily     POLYETHYLENE GLYCOL (GLYCOLAX) PACKET    Take 17 g by mouth 2 times daily.     PRAVASTATIN (PRAVACHOL) 40 MG TABLET      Take 40 mg by mouth nightly     PROMETHAZINE (PHENERGAN) 25 MG TABLET    Take 25 mg by mouth every 6 hours as needed for Nausea    QUETIAPINE (SEROQUEL) 25 MG TABLET    Take 500 mg by mouth nightly Patient takes 1 200 mg tablet + 1 300 mg tablet at bedtime    SOLIFENACIN (VESICARE) or diaphoretic. HENT:      Head: Normocephalic and atraumatic. Nose: Nose normal.   Eyes:      Extraocular Movements: Extraocular movements intact. Conjunctiva/sclera: Conjunctivae normal.      Pupils: Pupils are equal, round, and reactive to light. Cardiovascular:      Rate and Rhythm: Regular rhythm. Bradycardia present. Pulses: Normal pulses. Heart sounds: No murmur heard. No friction rub. No gallop. Pulmonary:      Effort: Pulmonary effort is normal. No respiratory distress. Breath sounds: Normal breath sounds. No stridor. No wheezing, rhonchi or rales. Chest:      Chest wall: No tenderness. Abdominal:      General: Abdomen is flat. Bowel sounds are normal.      Palpations: Abdomen is soft. Tenderness: There is no abdominal tenderness. Musculoskeletal:         General: No swelling or tenderness. Normal range of motion. Cervical back: Normal range of motion. Skin:     General: Skin is warm and dry. Neurological:      General: No focal deficit present. Mental Status: He is alert and oriented to person, place, and time. Mental status is at baseline. Psychiatric:         Mood and Affect: Mood normal.         Behavior: Behavior normal.         Thought Content: Thought content normal.         DIFFERENTIAL DIAGNOSIS:   Differential diagnoses are discussed    DIAGNOSTIC RESULTS     EKG: All EKG's are interpreted by the Emergency Department Physician who either signs or Co-signsthis chart in the absence of a cardiologist.    Vent. Rate: 55 bpm  PRinterval: 144 ms  QRS duration: 128 ms  QTc: 464 ms  P-R-T axes: 54, 17, 45  Sinus bradycardia. No STEMI. Compared to old EKG on 12-12-19      RADIOLOGY: non-plain film images(s) such as CT, Ultrasound and MRI are read by the radiologist.    XR CHEST PORTABLE   Final Result   1. Normal heart size. Prior anterior cervical fusion. 2. No acute findings. No infiltrates or effusions are seen.             **This report has been created using voice recognition software. It may contain minor errors which are inherent in voice recognition technology. **      Final report electronically signed by Dr. Gomez Officer on 6/10/2021 3:27 PM          LABS:      Labs Reviewed   CBC WITH AUTO DIFFERENTIAL - Abnormal; Notable for the following components:       Result Value    RBC 3.59 (*)     Hemoglobin 11.2 (*)     Hematocrit 35.0 (*)     MCV 97.5 (*)     MCHC 32.0 (*)     All other components within normal limits   COMPREHENSIVE METABOLIC PANEL W/ REFLEX TO MG FOR LOW K - Abnormal; Notable for the following components:    BUN 27 (*)     CO2 21 (*)     Total Bilirubin 0.2 (*)     All other components within normal limits   GLOMERULAR FILTRATION RATE, ESTIMATED - Abnormal; Notable for the following components:    Est, Glom Filt Rate 61 (*)     All other components within normal limits   TROPONIN   ANION GAP   OSMOLALITY   TROPONIN       EMERGENCY DEPARTMENT COURSE:   Vitals:    Vitals:    06/10/21 1420 06/10/21 1708   BP: (!) 145/79 139/75   Pulse: 52 64   Resp: 12 14   Temp: 98.3 °F (36.8 °C)    TempSrc: Oral    SpO2: 98% 98%   Weight: 174 lb (78.9 kg)       2:50 PM EDT: The patient was seen and evaluated. Patient presents for complaints of an episode of chest pain that has resolved entirely prior to initial evaluation with the use of 1 sublingual nitroglycerin without any recurrence of the symptoms during his ED stay. He arrives mildly hypertensive and this resolved without intervention. Laboratory results are reassuring, including repeat troponin. Chest x-ray without acute abnormality. Results discussed with the patient and sister at bedside. They feel comfortable with return to the ECF. Return precautions were discussed. Patient will contact his cardiologist tomorrow. CRITICAL CARE:   None    CONSULTS:  None    PROCEDURES:  None    FINAL IMPRESSION      1.  Chest pain, unspecified type          DISPOSITION/PLAN

## 2021-06-10 NOTE — ED NOTES
Bed: 011A  Expected date: 6/10/21  Expected time:   Means of arrival: Karolyn  Comments:      Michael Hernandez RN  06/10/21 4411

## 2021-06-10 NOTE — ED NOTES
Sister called and she stated she was 10 minutes away. Pt got dressed and wheel chaired out to [employer]. RR unlabored. Pt denied chest pain.      Mathew Aguilar RN  06/10/21 1928

## 2021-06-11 ENCOUNTER — HOSPITAL ENCOUNTER (OUTPATIENT)
Dept: MRI IMAGING | Age: 61
Discharge: HOME | End: 2021-06-11
Payer: MEDICAID

## 2021-06-11 DIAGNOSIS — M25.562 LEFT KNEE PAIN, UNSPECIFIED CHRONICITY: ICD-10-CM

## 2021-06-11 PROCEDURE — 73721 MRI JNT OF LWR EXTRE W/O DYE: CPT

## 2021-10-28 ENCOUNTER — HOSPITAL ENCOUNTER (EMERGENCY)
Age: 61
Discharge: HOME | End: 2021-10-28
Attending: EMERGENCY MEDICINE
Payer: MEDICAID

## 2021-10-28 VITALS
RESPIRATION RATE: 15 BRPM | HEART RATE: 56 BPM | BODY MASS INDEX: 23.8 KG/M2 | HEIGHT: 71 IN | DIASTOLIC BLOOD PRESSURE: 69 MMHG | TEMPERATURE: 97.9 F | SYSTOLIC BLOOD PRESSURE: 119 MMHG | WEIGHT: 170 LBS | OXYGEN SATURATION: 98 %

## 2021-10-28 DIAGNOSIS — S66.912A STRAIN OF LEFT WRIST, INITIAL ENCOUNTER: Primary | ICD-10-CM

## 2021-10-28 PROCEDURE — 2709999900 HC NON-CHARGEABLE SUPPLY

## 2021-10-28 PROCEDURE — 6370000000 HC RX 637 (ALT 250 FOR IP): Performed by: EMERGENCY MEDICINE

## 2021-10-28 PROCEDURE — 99284 EMERGENCY DEPT VISIT MOD MDM: CPT

## 2021-10-28 RX ORDER — GUAIFENESIN 600 MG/1
600 TABLET, EXTENDED RELEASE ORAL 2 TIMES DAILY
COMMUNITY

## 2021-10-28 RX ORDER — ACETAMINOPHEN 500 MG
1000 TABLET ORAL ONCE
Status: COMPLETED | OUTPATIENT
Start: 2021-10-28 | End: 2021-10-28

## 2021-10-28 RX ADMIN — ACETAMINOPHEN 1000 MG: 500 TABLET ORAL at 08:44

## 2021-10-28 ASSESSMENT — PAIN DESCRIPTION - ORIENTATION
ORIENTATION: LEFT
ORIENTATION: LEFT

## 2021-10-28 ASSESSMENT — PAIN DESCRIPTION - DIRECTION: RADIATING_TOWARDS: ELBOW

## 2021-10-28 ASSESSMENT — PAIN SCALES - GENERAL
PAINLEVEL_OUTOF10: 4
PAINLEVEL_OUTOF10: 8

## 2021-10-28 ASSESSMENT — PAIN DESCRIPTION - LOCATION
LOCATION: WRIST
LOCATION: WRIST

## 2021-10-28 NOTE — ED PROVIDER NOTES
[facility]  eMERGENCY dEPARTMENT eNCOUnter             Berry Villagomez 17 COMPLAINT    Chief Complaint   Patient presents with    Arm Pain       Nurses Notes reviewed and I agree except as noted in the HPI. HPI    Gae Cabot is a 59 y.o. male who presents with a 3-day history of pain in the left wrist and in the ulnar aspect of the left forearm. Onset after doing a lot of work \"in the garden \"where he lives, getting rid of brush and sawing some tree branches. He has not fallen. He does have a history of arthritis. He is not able to take nonsteroidal anti-inflammatory drugs, because of chronic renal insufficiency. Current pain level is 8/10, aching, constant. No fall or specific injury. REVIEW OF SYSTEMS      Review of Systems   Constitutional: Negative. Neurological: Negative for sensory change and focal weakness. All other systems reviewed and are negative. PAST MEDICAL HISTORY     has a past medical history of Angina at rest [facility]), Anxiety, Anxiety, Asperger's disorder, Asthma, Cervical radiculopathy, Depression, Depression, Gall stones, GERD (gastroesophageal reflux disease), Hyperlipidemia, Hypertension, Seizures ([facility] [address] and Tremor. SURGICAL HISTORY     has a past surgical history that includes Cholecystectomy, laparoscopic; Colonoscopy; Neck surgery (1996); Knee arthroscopy (Left, 1984); Esophagus dilation; Colonoscopy; Upper gastrointestinal endoscopy; Abdomen surgery; Endoscopy, colon, diagnostic; Dilatation, esophagus; Cholecystectomy (2002); Cardiac surgery; TURP (2/11/2016); and sigmoidoscopy (Left, 6/30/2020).     CURRENT MEDICATIONS    Previous Medications    ACETAMINOPHEN (TYLENOL) 500 MG TABLET    Take 500 mg by mouth every 4 hours as needed for Pain    ALBUTEROL SULFATE (PROAIR HFA IN)    Inhale 90 mcg into the lungs every 4 hours as needed    ARTIFICIAL TEAR OP    Apply to eye 1 drop in each eye 2-4 times per day    ASPIRIN 81 MG TABLET    Take 81 mg by mouth daily    BENZONATATE (TESSALON) 100 MG CAPSULE    Take 100 mg by mouth daily. CALCIUM CARBONATE (TUMS) 500 MG CHEWABLE TABLET    Take 1 tablet by mouth as needed for Heartburn    CITALOPRAM (CELEXA) 40 MG TABLET    Take 1 tablet by mouth daily. CLOPIDOGREL (PLAVIX) 75 MG TABLET    Take 75 mg by mouth daily    CYCLOBENZAPRINE (FLEXERIL) 10 MG TABLET    Take 10 mg by mouth 2 times daily as needed for Muscle spasms    DOCUSATE SODIUM (COLACE) 100 MG CAPSULE    Take 100 mg by mouth 2 times daily    DOXEPIN (SINEQUAN) 25 MG CAPSULE    Take 25 mg by mouth nightly    DUTASTERIDE (AVODART) 0.5 MG CAPSULE    Take 1 capsule by mouth daily    FINASTERIDE (PROSCAR) 5 MG TABLET    Take 5 mg by mouth daily    HYDROCODONE-ACETAMINOPHEN (NORCO) 5-325 MG PER TABLET    Take 1 tablet by mouth every 6 hours as needed for Pain . HYDROXYZINE (ATARAX) 25 MG TABLET    Take 25 mg by mouth 4 times daily as needed for Itching    IPRATROPIUM-ALBUTEROL (DUONEB) 0.5-2.5 (3) MG/3ML SOLN NEBULIZER SOLUTION    Inhale 3 mLs into the lungs every 4 hours as needed for Shortness of Breath    ISOSORBIDE MONONITRATE (IMDUR) 30 MG CR TABLET    Take 0.5 tablets by mouth daily. LACTOBACILLUS ACIDOPHILUS (FLORANEX)    Take 1 tablet by mouth 3 times daily    LOPERAMIDE (IMODIUM) 2 MG CAPSULE    Take 2 mg by mouth as needed for Diarrhea    LORATADINE (CLARITIN) 10 MG TABLET    Take 10 mg by mouth daily. LORAZEPAM (ATIVAN) 0.5 MG TABLET    Take 0.5 mg by mouth daily as needed for Anxiety    MONTELUKAST SODIUM (SINGULAIR PO)      Take 10 mg by mouth daily     NITROGLYCERIN (NITROSTAT) 0.4 MG SL TABLET    Place 1 tablet under the tongue every 5 minutes as needed for Chest pain.     OLANZAPINE (ZYPREXA) 5 MG TABLET    Take 5 mg by mouth nightly    ONDANSETRON (ZOFRAN) 4 MG TABLET      Take 8 mg by mouth every 12 hours as needed     PANTOPRAZOLE (PROTONIX) 40 MG TABLET      Take 40 mg by mouth 2 times daily drink alcohol and does not use drugs. PHYSICAL EXAM       INITIAL VITALS: /69   Pulse 56   Temp 97.9 °F (36.6 °C) (Temporal)   Resp 15   Ht 5' 11\" (1.803 m)   Wt 170 lb (77.1 kg)   SpO2 98%   BMI 23.71 kg/m²      Physical Exam  Vitals and nursing note reviewed. Constitutional:       General: He is not in acute distress. Eyes:      Pupils: Pupils are equal, round, and reactive to light. Cardiovascular:      Pulses: Normal pulses. Pulmonary:      Effort: Pulmonary effort is normal. No respiratory distress. Musculoskeletal:      Cervical back: Tenderness (lateral muscles left side) present. Comments: Mild swelling left wrist area compared with the right, no deformity. Pain with range of motion of the left wrist.  Soft tissue tenderness left forearm, left shoulder, left elbow, with full range of motion. Skin:     General: Skin is warm and dry. Capillary Refill: Capillary refill takes less than 2 seconds. Findings: No erythema. Neurological:      General: No focal deficit present. Mental Status: He is alert and oriented to person, place, and time. Psychiatric:         Behavior: Behavior normal.           Vitals:    Vitals:    10/28/21 0809   BP: 119/69   Pulse: 56   Resp: 15   Temp: 97.9 °F (36.6 °C)   TempSrc: Temporal   SpO2: 98%   Weight: 170 lb (77.1 kg)   Height: 5' 11\" (1.803 m)       EMERGENCY DEPARTMENT COURSE:    This is basically an overuse injury. No x-rays done. I instructed the patient to rest his arm for a few days to let it heal.  We will give him a Velcro wrist brace for comfort for his left wrist.  Tylenol as needed for pain. FINAL IMPRESSION      1.  Strain of left wrist, initial encounter        DISPOSITION/PLAN    DISPOSITION Decision To Discharge 10/28/2021 08:29:01 AM      PATIENT REFERRED TO:    Tammy Poole MD  [facility] [address] [id number] [phone]      As needed    (Please note that portions of this note were completed with a voice recognition program.  Efforts were made to edit the dictations but occasionally words are mis-transcribed.)      Denise Brown MD  10/28/21 9949

## 2021-11-25 ENCOUNTER — HOSPITAL ENCOUNTER (EMERGENCY)
Age: 61
Discharge: HOME | End: 2021-11-25
Attending: EMERGENCY MEDICINE
Payer: MEDICAID

## 2021-11-25 ENCOUNTER — APPOINTMENT (OUTPATIENT)
Dept: CT IMAGING | Age: 61
End: 2021-11-25
Payer: MEDICAID

## 2021-11-25 ENCOUNTER — APPOINTMENT (OUTPATIENT)
Dept: GENERAL RADIOLOGY | Age: 61
End: 2021-11-25
Payer: MEDICAID

## 2021-11-25 VITALS
BODY MASS INDEX: 23.8 KG/M2 | HEART RATE: 51 BPM | DIASTOLIC BLOOD PRESSURE: 76 MMHG | SYSTOLIC BLOOD PRESSURE: 171 MMHG | WEIGHT: 170 LBS | OXYGEN SATURATION: 99 % | RESPIRATION RATE: 15 BRPM | TEMPERATURE: 97.6 F | HEIGHT: 71 IN

## 2021-11-25 DIAGNOSIS — R00.1 BRADYCARDIA: ICD-10-CM

## 2021-11-25 DIAGNOSIS — R46.89 AGGRESSIVE BEHAVIOR: Primary | ICD-10-CM

## 2021-11-25 DIAGNOSIS — R07.89 ATYPICAL CHEST PAIN: ICD-10-CM

## 2021-11-25 LAB
ALBUMIN SERPL-MCNC: 3.7 GM/DL (ref 3.4–5)
ALP BLD-CCNC: 66 U/L (ref 46–116)
ALT SERPL-CCNC: 24 U/L (ref 14–63)
ANION GAP: 7 MEQ/L (ref 8–16)
AST SERPL-CCNC: 18 U/L (ref 15–37)
BASOPHILS # BLD: 0.4 % (ref 0–3)
BILIRUB SERPL-MCNC: 0.3 MG/DL (ref 0.2–1)
BUN BLDV-MCNC: 29 MG/DL (ref 7–18)
CHLORIDE BLD-SCNC: 109 MEQ/L (ref 98–107)
CO2: 27 MEQ/L (ref 21–32)
CREAT SERPL-MCNC: 1.1 MG/DL (ref 0.6–1.3)
EKG ATRIAL RATE: 47 BPM
EKG P AXIS: 45 DEGREES
EKG P-R INTERVAL: 150 MS
EKG Q-T INTERVAL: 516 MS
EKG QRS DURATION: 122 MS
EKG QTC CALCULATION (BAZETT): 456 MS
EKG R AXIS: 6 DEGREES
EKG T AXIS: 25 DEGREES
EKG VENTRICULAR RATE: 47 BPM
EOSINOPHILS RELATIVE PERCENT: 8.2 % (ref 0–4)
GFR, ESTIMATED: 72 ML/MIN/1.73M2
GLUCOSE BLD-MCNC: 95 MG/DL (ref 74–106)
HCT VFR BLD CALC: 36.6 % (ref 42–52)
HEMOGLOBIN: 12.1 GM/DL (ref 14–18)
LYMPHOCYTES # BLD: 37.7 % (ref 15–47)
MAGNESIUM: 2.2 MG/DL (ref 1.8–2.4)
MCH RBC QN AUTO: 31.5 PG (ref 27–31)
MCHC RBC AUTO-ENTMCNC: 33.2 GM/DL (ref 33–37)
MCV RBC AUTO: 94.7 FL (ref 80–94)
MONOCYTES: 10.8 % (ref 0–12)
PDW BLD-RTO: 12.2 % (ref 11.5–14.5)
PLATELET # BLD: 197 THOU/MM3 (ref 130–400)
PMV BLD AUTO: 8.1 FL (ref 7.4–10.4)
POC CALCIUM: 8.7 MG/DL (ref 8.5–10.1)
POTASSIUM SERPL-SCNC: 4.8 MEQ/L (ref 3.5–5.1)
RBC # BLD: 3.86 MILL/MM3 (ref 4.7–6.1)
SEGS: 42.9 % (ref 43–75)
SODIUM BLD-SCNC: 143 MEQ/L (ref 136–145)
TOTAL PROTEIN: 7.1 GM/DL (ref 6.4–8.2)
TROPONIN I: < 0.017 NG/ML (ref 0.02–0.06)
TROPONIN I: < 0.017 NG/ML (ref 0.02–0.06)
WBC # BLD: 5.3 THOU/MM3 (ref 4.8–10.8)

## 2021-11-25 PROCEDURE — 6370000000 HC RX 637 (ALT 250 FOR IP): Performed by: EMERGENCY MEDICINE

## 2021-11-25 PROCEDURE — 70450 CT HEAD/BRAIN W/O DYE: CPT

## 2021-11-25 PROCEDURE — 36415 COLL VENOUS BLD VENIPUNCTURE: CPT

## 2021-11-25 PROCEDURE — 93005 ELECTROCARDIOGRAM TRACING: CPT | Performed by: EMERGENCY MEDICINE

## 2021-11-25 PROCEDURE — 80053 COMPREHEN METABOLIC PANEL: CPT

## 2021-11-25 PROCEDURE — 71046 X-RAY EXAM CHEST 2 VIEWS: CPT

## 2021-11-25 PROCEDURE — 73030 X-RAY EXAM OF SHOULDER: CPT

## 2021-11-25 PROCEDURE — 83735 ASSAY OF MAGNESIUM: CPT

## 2021-11-25 PROCEDURE — 99285 EMERGENCY DEPT VISIT HI MDM: CPT

## 2021-11-25 PROCEDURE — 85025 COMPLETE CBC W/AUTO DIFF WBC: CPT

## 2021-11-25 PROCEDURE — 84484 ASSAY OF TROPONIN QUANT: CPT

## 2021-11-25 RX ORDER — LORAZEPAM 0.5 MG/1
0.5 TABLET ORAL 3 TIMES DAILY PRN
Qty: 30 TABLET | Refills: 0 | Status: SHIPPED | OUTPATIENT
Start: 2021-11-25 | End: 2021-11-25 | Stop reason: SDUPTHER

## 2021-11-25 RX ORDER — LORAZEPAM 0.5 MG/1
0.5 TABLET ORAL 3 TIMES DAILY PRN
Qty: 30 TABLET | Refills: 0 | Status: SHIPPED | OUTPATIENT
Start: 2021-11-25 | End: 2021-12-25

## 2021-11-25 RX ORDER — AMLODIPINE BESYLATE 5 MG/1
5 TABLET ORAL DAILY
Qty: 30 TABLET | Refills: 0 | Status: SHIPPED | OUTPATIENT
Start: 2021-11-25 | End: 2021-11-25 | Stop reason: SDUPTHER

## 2021-11-25 RX ORDER — CLOPIDOGREL BISULFATE 75 MG/1
75 TABLET ORAL ONCE
Status: COMPLETED | OUTPATIENT
Start: 2021-11-25 | End: 2021-11-25

## 2021-11-25 RX ORDER — ASPIRIN 81 MG/1
81 TABLET, CHEWABLE ORAL ONCE
Status: COMPLETED | OUTPATIENT
Start: 2021-11-25 | End: 2021-11-25

## 2021-11-25 RX ORDER — ACETAMINOPHEN 325 MG/1
975 TABLET ORAL ONCE
Status: COMPLETED | OUTPATIENT
Start: 2021-11-25 | End: 2021-11-25

## 2021-11-25 RX ORDER — AMLODIPINE BESYLATE 5 MG/1
5 TABLET ORAL DAILY
Qty: 30 TABLET | Refills: 0 | Status: ON HOLD | OUTPATIENT
Start: 2021-11-25 | End: 2022-04-06

## 2021-11-25 RX ADMIN — ACETAMINOPHEN 975 MG: 325 TABLET ORAL at 09:40

## 2021-11-25 RX ADMIN — CLOPIDOGREL BISULFATE 75 MG: 75 TABLET ORAL at 10:54

## 2021-11-25 RX ADMIN — ASPIRIN 81 MG CHEWABLE TABLET 81 MG: 81 TABLET CHEWABLE at 10:54

## 2021-11-25 ASSESSMENT — PAIN DESCRIPTION - PAIN TYPE: TYPE: ACUTE PAIN

## 2021-11-25 ASSESSMENT — PAIN SCALES - GENERAL: PAINLEVEL_OUTOF10: 6

## 2021-11-25 ASSESSMENT — PAIN DESCRIPTION - ORIENTATION: ORIENTATION: LEFT

## 2021-11-25 ASSESSMENT — PAIN DESCRIPTION - LOCATION: LOCATION: SHOULDER;CHEST

## 2021-11-25 ASSESSMENT — PAIN DESCRIPTION - DESCRIPTORS: DESCRIPTORS: ACHING

## 2021-11-25 NOTE — ED NOTES
Explained to pt that we will redraw the troponin at 1145. Aashish crackers and ice water given. Pt relaxing, states he has no chest pain. Pt's sister and niece are at the bedside. They wish for pt to be admitted to psych as he acts like this and gets very anxious every year around the holidays.       Bry Hubbard RN  11/25/21 7280

## 2021-11-25 NOTE — ED TRIAGE NOTES
Pt was having chest pain, got up at 0730 to get a NTG and fell injuring left shoulder. Pt states at that time his jaw also hurt \"quite a bit\". Pt denies having chest pain at this time but holds left shoulder and left upper chest when points to pain. Superficial abrasions noted of left shoulder. Denies SOB, nausea, diaphoresis now.  (Was SOB with the chest pain this morning.)

## 2021-11-25 NOTE — ED PROVIDER NOTES
[facility]  eMERGENCY dEPARTMENT eNCOUnter             Berry Villagomez 17 COMPLAINT    Chief Complaint   Patient presents with    Shoulder Injury     pt fell this morning injuring left shoulder.  Chest Pain     left shoulder to chest pain. States he was having jaw pain this morning. Nurses Notes reviewed and I agree except as noted in the HPI. HPI    Duane Cancel is a 59 y.o. male who presents from a local assisted living facility stating that he has had chest pain onset at 0730 this AM, pain in the left chest, left jaw,and left shoulder. When he got up to get a NTG, he fell, injuring the left shoulder, so was brought here for evaluation. He denies chest pain or dyspnea now. Pain was 6/10, aching, constant. His left shoulder is a little sore with palpation and ROM. His family is concerned that this episode represents some anxiety and stress this patient is experiencing, stating that he Gerhardt Guillaume does this around the holidays'. He has exibited some aggressive behaviors at the facility, causing him to have to go to a psych facility in the past, and they want to avoid that if possible. No such behavior reported by the facility to day. He has a cardiologist who recently saw him and thought his heart was OK. REVIEW OF SYSTEMS      Review of Systems   Constitutional: Positive for malaise/fatigue. Negative for fever. HENT: Negative for congestion and sore throat. Respiratory: Negative for cough, shortness of breath and wheezing. Cardiovascular: Negative for palpitations and leg swelling. Gastrointestinal: Negative for abdominal pain and nausea. Musculoskeletal: Negative. Neurological: Positive for weakness. Negative for dizziness, focal weakness, loss of consciousness and headaches. Psychiatric/Behavioral: Positive for depression. The patient is nervous/anxious. All other systems reviewed and are negative.        PAST MEDICAL HISTORY has a past medical history of Angina at rest [facility]), Anxiety, Anxiety, Asperger's disorder, Asthma, Cervical radiculopathy, Depression, Depression, Gall stones, GERD (gastroesophageal reflux disease), Hyperlipidemia, Hypertension, Seizures ([facility] [address] and Tremor. SURGICAL HISTORY     has a past surgical history that includes Cholecystectomy, laparoscopic; Colonoscopy; Neck surgery (1996); Knee arthroscopy (Left, 1984); Esophagus dilation; Colonoscopy; Upper gastrointestinal endoscopy; Abdomen surgery; Endoscopy, colon, diagnostic; Dilatation, esophagus; Cholecystectomy (2002); Cardiac surgery; TURP (2/11/2016); and sigmoidoscopy (Left, 6/30/2020). CURRENT MEDICATIONS    Discharge Medication List as of 11/25/2021 12:03 PM      CONTINUE these medications which have NOT CHANGED    Details   traMADol (ULTRAM) 50 MG tablet Take 50 mg by mouth every 8 hours as needed for Pain. Historical Med      acetaminophen (TYLENOL) 500 MG tablet Take 500 mg by mouth every 4 hours as needed for PainHistorical Med      HYDROcodone-acetaminophen (NORCO) 5-325 MG per tablet Take 1 tablet by mouth every 6 hours as needed for Pain . Historical Med      metoprolol tartrate (LOPRESSOR) 25 MG tablet Take 25 mg by mouth 2 times daily 1/2 tabletHistorical Med      guaiFENesin (MUCUS RELIEF) 600 MG extended release tablet Take 1,200 mg by mouth 2 times dailyHistorical Med      finasteride (PROSCAR) 5 MG tablet Take 5 mg by mouth dailyHistorical Med      doxepin (SINEQUAN) 25 MG capsule Take 25 mg by mouth nightlyHistorical Med      solifenacin (VESICARE) 10 MG tablet Take 5 mg by mouth dailyHistorical Med      docusate sodium (COLACE) 100 MG capsule Take 100 mg by mouth 2 times dailyHistorical Med      hydrOXYzine (ATARAX) 25 MG tablet Take 25 mg by mouth 4 times daily as needed for ItchingHistorical Med      loperamide (IMODIUM) 2 MG capsule Take 2 mg by mouth as needed for DiarrheaHistorical Med      promethazine (PHENERGAN) 25 MG tablet Take 25 nitroGLYCERIN (NITROSTAT) 0.4 MG SL tablet Place 1 tablet under the tongue every 5 minutes as needed for Chest pain., Disp-25 tablet, R-1      polyethylene glycol (GLYCOLAX) packet Take 17 g by mouth 2 times daily. , Disp-1200 g, R-11      pantoprazole (PROTONIX) 40 MG tablet   Take 40 mg by mouth 2 times daily       sucralfate (CARAFATE) 1 GM/10ML suspension   Take 1 g by mouth 4 times daily (before meals and nightly)       Montelukast Sodium (SINGULAIR PO)   Take 10 mg by mouth daily       benzonatate (TESSALON) 100 MG capsule Take 100 mg by mouth daily. loratadine (CLARITIN) 10 MG tablet Take 10 mg by mouth daily. ALLERGIES    is allergic to morphine, caffeine, percodan [oxycodone-aspirin], tape [adhesive tape], zyban [bupropion hcl], dicyclomine hcl, and oxycodone. FAMILY HISTORY    He indicated that his mother is . He indicated that his father is alive. He indicated that his sister is alive. He indicated that his brother is alive. family history includes Arthritis in his father; Asthma in his brother; Cancer in his father and mother; Depression in his father; Diabetes in his father and mother; Heart Disease in his father and mother; High Blood Pressure in his mother and sister; High Cholesterol in his mother and sister; Mental Retardation in his brother; Obesity in his brother; Substance Abuse in his father. SOCIAL HISTORY     reports that he has never smoked. He has never used smokeless tobacco. He reports that he does not drink alcohol and does not use drugs. PHYSICAL EXAM       INITIAL VITALS: BP (!) 171/76   Pulse 51   Temp 97.6 °F (36.4 °C)   Resp 15   Ht 5' 11\" (1.803 m)   Wt 170 lb (77.1 kg)   SpO2 99%   BMI 23.71 kg/m²      Physical Exam  Vitals and nursing note reviewed. Exam conducted with a chaperone present. Constitutional:       General: He is not in acute distress. Appearance: He is not toxic-appearing.    HENT:      Right Ear: Tympanic membrane normal.      Left Ear: Tympanic membrane normal.      Nose: Nose normal. No congestion or rhinorrhea. Mouth/Throat:      Mouth: Mucous membranes are moist.      Pharynx: Oropharynx is clear. No oropharyngeal exudate or posterior oropharyngeal erythema. Eyes:      Conjunctiva/sclera: Conjunctivae normal.      Pupils: Pupils are equal, round, and reactive to light. Cardiovascular:      Rate and Rhythm: Normal rate and regular rhythm. Heart sounds: No murmur heard. Pulmonary:      Effort: Pulmonary effort is normal. No respiratory distress. Breath sounds: Normal breath sounds. No wheezing. Abdominal:      General: Bowel sounds are normal.      Palpations: Abdomen is soft. There is no mass. Tenderness: There is no abdominal tenderness. There is no guarding. Musculoskeletal:         General: Tenderness (left shoulder, no deformity, a few scratches anteriorly. FROM. no crepitus. ) present. No swelling. Cervical back: Neck supple. Lymphadenopathy:      Cervical: No cervical adenopathy. Skin:     General: Skin is warm and dry. Findings: No erythema. Neurological:      General: No focal deficit present. Mental Status: He is alert and oriented to person, place, and time. Psychiatric:         Behavior: Behavior normal.       DIAGNOSTIC RESULTS    EKG       Sinus bradycardia  Nonspecific intraventricular conduction delay  Borderline ECG  When compared with ECG of 10-NAV-2021 14:15,  No significant change was found  Confirmed by IZA CHAVEZ ([id number]) on 11/25/2021 8:41:35 PM    RADIOLOGY:    XR CHEST (2 VW)   Final Result   1. No interval change since previous study dated 10 Vera 2021, no acute cardiopulmonary disease. .               **This report has been created using voice recognition software. It may contain minor errors which are inherent in voice recognition technology. **      Final report electronically signed by DR Arnold Lux on 11/25/2021 9:54 AM      XR SHOULDER LEFT (MIN 2 VIEWS)   Final Result       1. Mild diffuse osteopenia. 2. Degenerative change involving the acromioclavicular and glenohumeral joints. 3. No acute fracture or dislocation. 4. Slight irregularity in the humeral head posterolaterally, unchanged since previous study dated 14 August 2014. **This report has been created using voice recognition software. It may contain minor errors which are inherent in voice recognition technology. **      Final report electronically signed by DR Carlos Cardenas on 11/25/2021 9:57 AM      CT HEAD WO CONTRAST   Final Result       1. Stable CT scan of the brain, no interval change since previous study dated 17th of August 2016. .               **This report has been created using voice recognition software. It may contain minor errors which are inherent in voice recognition technology. **      Final report electronically signed by DR Carlos Cardenas on 11/25/2021 9:53 AM             LABS:     Labs Reviewed   CBC WITH AUTO DIFFERENTIAL - Abnormal; Notable for the following components:       Result Value    RBC 3.86 (*)     Hemoglobin 12.1 (*)     Hematocrit 36.6 (*)     MCV 94.7 (*)     MCH 31.5 (*)     SEGS 42.9 (*)     Eosinophils % 8.2 (*)     All other components within normal limits   COMPREHENSIVE METABOLIC PANEL - Abnormal; Notable for the following components:    BUN 29 (*)     Chloride 109 (*)     All other components within normal limits   GLOMERULAR FILTRATION RATE, ESTIMATED - Abnormal; Notable for the following components:    GFR, Estimated 72 (*)     All other components within normal limits   ANION GAP - Abnormal; Notable for the following components:    Anion Gap 7.0 (*)     All other components within normal limits   TROPONIN   MAGNESIUM   TROPONIN       Vitals:    Vitals:    11/25/21 1115 11/25/21 1130 11/25/21 1145 11/25/21 1200   BP: 125/80 (!) 167/82 (!) 174/78 (!) 171/76   Pulse: 50 52 (!) 49 51   Resp: 15 15 12 15   Temp:       SpO2: 99% 98% 100%

## 2021-11-26 ASSESSMENT — ENCOUNTER SYMPTOMS
COUGH: 0
NAUSEA: 0
SORE THROAT: 0
SHORTNESS OF BREATH: 0
ABDOMINAL PAIN: 0
WHEEZING: 0

## 2021-12-01 ENCOUNTER — HOSPITAL ENCOUNTER (EMERGENCY)
Age: 61
Discharge: HOME | End: 2021-12-01
Attending: EMERGENCY MEDICINE
Payer: MEDICAID

## 2021-12-01 VITALS
DIASTOLIC BLOOD PRESSURE: 74 MMHG | HEART RATE: 81 BPM | RESPIRATION RATE: 17 BRPM | SYSTOLIC BLOOD PRESSURE: 153 MMHG | BODY MASS INDEX: 23.8 KG/M2 | TEMPERATURE: 98.4 F | OXYGEN SATURATION: 98 % | HEIGHT: 71 IN | WEIGHT: 170 LBS

## 2021-12-01 DIAGNOSIS — T50.Z95A ADVERSE EFFECT OF VACCINE, INITIAL ENCOUNTER: ICD-10-CM

## 2021-12-01 DIAGNOSIS — R07.89 ATYPICAL CHEST PAIN: Primary | ICD-10-CM

## 2021-12-01 LAB
ALBUMIN SERPL-MCNC: 3.9 GM/DL (ref 3.4–5)
ALP BLD-CCNC: 73 U/L (ref 46–116)
ALT SERPL-CCNC: 22 U/L (ref 14–63)
ANION GAP: 12 MEQ/L (ref 8–16)
AST SERPL-CCNC: 18 U/L (ref 15–37)
BASOPHILS # BLD: 0.4 % (ref 0–3)
BILIRUB SERPL-MCNC: 0.4 MG/DL (ref 0.2–1)
BUN BLDV-MCNC: 25 MG/DL (ref 7–18)
CHLORIDE BLD-SCNC: 108 MEQ/L (ref 98–107)
CO2: 24 MEQ/L (ref 21–32)
CREAT SERPL-MCNC: 1.1 MG/DL (ref 0.6–1.3)
EKG ATRIAL RATE: 90 BPM
EKG P AXIS: 56 DEGREES
EKG P-R INTERVAL: 142 MS
EKG Q-T INTERVAL: 404 MS
EKG QRS DURATION: 136 MS
EKG QTC CALCULATION (BAZETT): 494 MS
EKG R AXIS: 11 DEGREES
EKG T AXIS: 66 DEGREES
EKG VENTRICULAR RATE: 90 BPM
EOSINOPHILS RELATIVE PERCENT: 4.8 % (ref 0–4)
GFR, ESTIMATED: 72 ML/MIN/1.73M2
GLUCOSE BLD-MCNC: 110 MG/DL (ref 74–106)
HCT VFR BLD CALC: 39.2 % (ref 42–52)
HEMOGLOBIN: 13.2 GM/DL (ref 14–18)
LYMPHOCYTES # BLD: 17.1 % (ref 15–47)
MCH RBC QN AUTO: 32 PG (ref 27–31)
MCHC RBC AUTO-ENTMCNC: 33.6 GM/DL (ref 33–37)
MCV RBC AUTO: 95.3 FL (ref 80–94)
MONOCYTES: 7.3 % (ref 0–12)
PDW BLD-RTO: 12.4 % (ref 11.5–14.5)
PLATELET # BLD: 178 THOU/MM3 (ref 130–400)
PMV BLD AUTO: 7.8 FL (ref 7.4–10.4)
POC CALCIUM: 8 MG/DL (ref 8.5–10.1)
POTASSIUM SERPL-SCNC: 4.2 MEQ/L (ref 3.5–5.1)
RBC # BLD: 4.11 MILL/MM3 (ref 4.7–6.1)
SEGS: 70.4 % (ref 43–75)
SODIUM BLD-SCNC: 144 MEQ/L (ref 136–145)
TOTAL PROTEIN: 7.1 GM/DL (ref 6.4–8.2)
TROPONIN I: < 0.017 NG/ML (ref 0.02–0.06)
WBC # BLD: 8.4 THOU/MM3 (ref 4.8–10.8)

## 2021-12-01 PROCEDURE — 93005 ELECTROCARDIOGRAM TRACING: CPT | Performed by: EMERGENCY MEDICINE

## 2021-12-01 PROCEDURE — 93010 ELECTROCARDIOGRAM REPORT: CPT | Performed by: INTERNAL MEDICINE

## 2021-12-01 PROCEDURE — 84484 ASSAY OF TROPONIN QUANT: CPT

## 2021-12-01 PROCEDURE — 6370000000 HC RX 637 (ALT 250 FOR IP): Performed by: EMERGENCY MEDICINE

## 2021-12-01 PROCEDURE — 85025 COMPLETE CBC W/AUTO DIFF WBC: CPT

## 2021-12-01 PROCEDURE — 80053 COMPREHEN METABOLIC PANEL: CPT

## 2021-12-01 PROCEDURE — 99285 EMERGENCY DEPT VISIT HI MDM: CPT

## 2021-12-01 RX ORDER — LORAZEPAM 1 MG/1
1 TABLET ORAL ONCE
Status: COMPLETED | OUTPATIENT
Start: 2021-12-01 | End: 2021-12-01

## 2021-12-01 RX ORDER — ONDANSETRON 4 MG/1
4 TABLET, ORALLY DISINTEGRATING ORAL ONCE
Status: COMPLETED | OUTPATIENT
Start: 2021-12-01 | End: 2021-12-01

## 2021-12-01 RX ADMIN — LORAZEPAM 1 MG: 1 TABLET ORAL at 02:26

## 2021-12-01 RX ADMIN — ONDANSETRON 4 MG: 4 TABLET, ORALLY DISINTEGRATING ORAL at 02:02

## 2021-12-01 ASSESSMENT — PAIN DESCRIPTION - PAIN TYPE: TYPE: ACUTE PAIN

## 2021-12-01 ASSESSMENT — PAIN SCALES - GENERAL: PAINLEVEL_OUTOF10: 6

## 2021-12-01 ASSESSMENT — PAIN DESCRIPTION - LOCATION: LOCATION: GENERALIZED

## 2021-12-01 NOTE — ED NOTES
Reported off to [address] [place] [address]. Pt loaded into wheelchair and van and discharged back to [facility].       Pola Major RN  12/01/21 [id number]

## 2021-12-01 NOTE — ED NOTES
[facility] was called for an [address] ride back to [facility].       Paola Barraza RN  12/01/21 [id number]

## 2021-12-01 NOTE — ED NOTES
Report called back to [facility] DR TRINH PARKER staff.       Kirsten Sorenson, NILAY  12/01/21 9828

## 2021-12-01 NOTE — ED TRIAGE NOTES
Pt. Comes into ER room T2 via [facility] with having bilateral shoulder pain, chest pain, generalized body aches and pains , chronic back pain. The patient had a fall last week and he is still having right shoulder pain from that fall, she is also having left shoulder pain from a [address] he got yesterday. The patient was feeling jittery with anxiety and he started having CHEST PAIN. Upon arrival to the ER the patient has an IV line from the EMS squad. Labs were drawn from that IV line. EKG was done and Dr. Aydin Brandon was called. Dr. Aydin Brandon into see pt at bedside.

## 2021-12-01 NOTE — ED NOTES
The patient states no concerns other that wanting water and crackers.       Silvestre Bartholomew RN  12/01/21 7510

## 2021-12-01 NOTE — ED PROVIDER NOTES
[address]  [address] [address]  Phone: [address]    Chief Complaint   Patient presents with    Chest Pain    Allergic Reaction       HPI    Wil Nguyễn is a 59 y.o. male who presents above-noted complaint. Patient was doing fine. He did have a booster vaccine yesterday. Subsequently was and felt some discomfort in his chest.  Further vague. Is resting comfortably now. States he had some chest pain and discomfort priorly with his first shot. Feels like his stomach is upset he wants some crackers. PAST MEDICAL HISTORY    Past Medical History:   Diagnosis Date    Angina at rest [facility])     intermittent    Anxiety     Anxiety     Asperger's disorder     Asthma     Cervical radiculopathy     CHF [facility]congestive heart failure) ([facility])     Depression     Depression     Gall stones     GERD (gastroesophageal reflux disease)     Hyperlipidemia     Hypertension     Seizures ([facility] [address]     Tremor        SURGICAL HISTORY    Past Surgical History:   Procedure Laterality Date    ABDOMEN SURGERY      CARDIAC SURGERY      Heart Cath    CHOLECYSTECTOMY  2002    CHOLECYSTECTOMY, LAPAROSCOPIC      COLONOSCOPY      COLONOSCOPY      DILATATION, ESOPHAGUS      ENDOSCOPY, COLON, DIAGNOSTIC      ESOPHAGEAL DILATATION      KNEE ARTHROSCOPY Left 1984    NECK SURGERY  1996    plate in neck and fusion    SIGMOIDOSCOPY Left 6/30/2020    SIGMOIDOSCOPY DIAGNOSTIC FLEXIBLE performed by Bienvenido Lara MD at [facility]    TURP  2/11/2016    UPPER GASTROINTESTINAL ENDOSCOPY         CURRENT MEDICATIONS    Current Outpatient Rx   Medication Sig Dispense Refill    amLODIPine (NORVASC) 5 MG tablet Take 1 tablet by mouth daily 30 tablet 0    LORazepam (ATIVAN) 0.5 MG tablet Take 1 tablet by mouth 3 times daily as needed for Anxiety for up to 30 days.  30 tablet 0    metoprolol tartrate (LOPRESSOR) 25 MG tablet Take 25 mg by mouth 2 times daily 1/2 tablet      guaiFENesin (MUCUS RELIEF) 600 MG extended release tablet Take 1,200 mg by mouth 2 times daily      finasteride (PROSCAR) 5 MG tablet Take 5 mg by mouth daily      doxepin (SINEQUAN) 25 MG capsule Take 25 mg by mouth nightly      solifenacin (VESICARE) 10 MG tablet Take 5 mg by mouth daily      docusate sodium (COLACE) 100 MG capsule Take 100 mg by mouth 2 times daily      hydrOXYzine (ATARAX) 25 MG tablet Take 25 mg by mouth 4 times daily as needed for Itching      loperamide (IMODIUM) 2 MG capsule Take 2 mg by mouth as needed for Diarrhea      promethazine (PHENERGAN) 25 MG tablet Take 25 mg by mouth every 6 hours as needed for Nausea      Albuterol Sulfate (PROAIR HFA IN) Inhale 90 mcg into the lungs every 4 hours as needed      traMADol (ULTRAM) 50 MG tablet Take 50 mg by mouth every 8 hours as needed for Pain.  calcium carbonate (TUMS) 500 MG chewable tablet Take 1 tablet by mouth as needed for Heartburn      acetaminophen (TYLENOL) 500 MG tablet Take 500 mg by mouth every 4 hours as needed for Pain      HYDROcodone-acetaminophen (NORCO) 5-325 MG per tablet Take 1 tablet by mouth every 6 hours as needed for Pain .       aspirin 81 MG tablet Take 81 mg by mouth daily      clopidogrel (PLAVIX) 75 MG tablet Take 75 mg by mouth daily      SYNTHROID 88 MCG tablet Take one tablet 7 days/week with extra 1/2 tablet one day/week 30 tablet 5    ipratropium-albuterol (DUONEB) 0.5-2.5 (3) MG/3ML SOLN nebulizer solution Inhale 3 mLs into the lungs every 4 hours as needed for Shortness of Breath 1 vial 0    lactobacillus acidophilus (FLORANEX) Take 1 tablet by mouth 3 times daily      QUEtiapine (SEROQUEL) 25 MG tablet Take 500 mg by mouth nightly Patient takes 1 200 mg tablet + 1 300 mg tablet at bedtime      ARTIFICIAL TEAR OP Apply to eye 1 drop in each eye 2-4 times per day      tamsulosin (FLOMAX) 0.4 MG capsule Take 1 capsule by mouth 2 times daily 2 tablets at bedtime 60 capsule 11    cyclobenzaprine (FLEXERIL) 10 MG tablet Take 10 mg by mouth 2 times daily as needed for Muscle spasms      zolpidem (AMBIEN CR) 12.5 MG CR tablet Take 10 mg by mouth nightly as needed for Sleep.  topiramate (TOPAMAX) 100 MG tablet Take 100 mg by mouth 2 times daily      ondansetron (ZOFRAN) 4 MG tablet   Take 8 mg by mouth every 12 hours as needed       pravastatin (PRAVACHOL) 40 MG tablet   Take 40 mg by mouth nightly       vitamin D (CHOLECALCIFEROL) 1000 UNIT TABS tablet Take 1 tablet by mouth daily. 30 tablet 1    citalopram (CELEXA) 40 MG tablet Take 1 tablet by mouth daily. 30 tablet 1    isosorbide mononitrate (IMDUR) 30 MG CR tablet Take 0.5 tablets by mouth daily. 30 tablet 1    nitroGLYCERIN (NITROSTAT) 0.4 MG SL tablet Place 1 tablet under the tongue every 5 minutes as needed for Chest pain. 25 tablet 1    polyethylene glycol (GLYCOLAX) packet Take 17 g by mouth 2 times daily. 1200 g 11    pantoprazole (PROTONIX) 40 MG tablet   Take 40 mg by mouth 2 times daily       sucralfate (CARAFATE) 1 GM/10ML suspension   Take 1 g by mouth 4 times daily (before meals and nightly)       Montelukast Sodium (SINGULAIR PO)   Take 10 mg by mouth daily       benzonatate (TESSALON) 100 MG capsule Take 100 mg by mouth daily.  loratadine (CLARITIN) 10 MG tablet Take 10 mg by mouth daily.            ALLERGIES    Allergies   Allergen Reactions    Morphine Shortness Of Breath     convulsions    Caffeine      Nervous and shakes all over    Percodan [Oxycodone-Aspirin]     Tape Jonne Knows Tape] Dermatitis    Zyban [Bupropion Hcl]     Dicyclomine Hcl Nausea And Vomiting    Oxycodone Nausea And Vomiting       FAMILY HISTORY    Family History   Problem Relation Age of Onset    Cancer Mother     Diabetes Mother     Heart Disease Mother     High Blood Pressure Mother     High Cholesterol Mother     Arthritis Father     Heart Disease Father     Cancer Father  Diabetes Father     Substance Abuse Father     Depression Father     Obesity Brother     Asthma Brother     Mental Retardation Brother     High Blood Pressure Sister     High Cholesterol Sister        SOCIAL HISTORY    Social History     Socioeconomic History    Marital status: Single     Spouse name: None    Number of children: None    Years of education: None    Highest education level: None   Occupational History    None   Tobacco Use    Smoking status: Never Smoker    Smokeless tobacco: Never Used   Vaping Use    Vaping Use: Never used   Substance and Sexual Activity    Alcohol use: No     Alcohol/week: 0.0 standard drinks    Drug use: No    Sexual activity: None   Other Topics Concern    None   Social History Narrative    None     Social Determinants of Health     Financial Resource Strain:     Difficulty of Paying Living Expenses: Not on file   Food Insecurity:     Worried About Running Out of Food in the Last Year: Not on file    Akash of Food in the Last Year: Not on file   Transportation Needs:     Lack of Transportation (Medical): Not on file    Lack of Transportation (Non-Medical):  Not on file   Physical Activity:     Days of Exercise per Week: Not on file    Minutes of Exercise per Session: Not on file   Stress:     Feeling of Stress : Not on file   Social Connections:     Frequency of Communication with Friends and Family: Not on file    Frequency of Social Gatherings with Friends and Family: Not on file    Attends [employer] Services: Not on file    Active Member of Clubs or Organizations: Not on file    Attends Club or Organization Meetings: Not on file    Marital Status: Not on file   Intimate Partner Violence:     Fear of Current or Ex-Partner: Not on file    Emotionally Abused: Not on file    Physically Abused: Not on file    Sexually Abused: Not on file   Housing Stability:     Unable to Pay for Housing in the Last Year: Not on file    Number of Places Lived in the Last Year: Not on file    Unstable Housing in the Last Year: Not on file       REVIEW OF SYSTEMS    Reported chest pain. No nausea vomiting although a little queasy to his stomach now. No bowel bladder habit changes. All systems negative except as marked. PHYSICAL EXAM    VITAL SIGNS: BP (!) 158/71   Pulse 86   Temp 98.4 °F (36.9 °C) (Oral)   Resp 15   Ht 5' 11\" (1.803 m)   Wt 170 lb (77.1 kg)   SpO2 98%   BMI 23.71 kg/m²    Constitutional:  Alert not toxic or ill, able to give coherent history older than stated age she is  HENT: COVID mask protection in place normocephalic, Atraumatic, mask lowered to assess  Bilateral external ears normal, Oropharynx moist, No oral exudates, Nose normal.  Cervical Spine: Normal range of motion,  No stridor. No tenderness, Supple,  Eyes:  No discharge or  Swelling,Conjunctiva normal, PERRL, EOMI,  Respiratory: No respiratory distress, Normal breath sounds,  No wheezing, No chest tenderness. Cardiovascular:  Normal heart rate, Normal rhythm, No murmurs, No rubs, No gallops. GI:  No reproducible pain, Bowel sounds normal, Soft, No masses, No pulsatile masses. No tenderness  Musculoskeletal:  Intact distal pulses, No edema, No tenderness, No cyanosis, No clubbing. Good range of motion in all major joints. No tenderness to palpation or major deformities noted. Back:No tenderness. Integument:  Warm, Dry, No erythema, No rash (on exposed areas)   Lymphatic:  No lymphadenopathy noted. Neurologic:  Alert & oriented x 3, Normal motor function, Normal sensory function, No focal deficits noted. Psychiatric:  Affect normal, Judgment normal, Mood normal.     EKG    Sinus rate at 90, . Right bundle branch block.   No ischemia or infarction                  RADIOLOGY    No orders to display       PROCEDURES    none      CONSULTS:  None      CRITICAL CARE:  None    SCREENINGS  BP (!) 158/71   Pulse 86   Temp 98.4 °F (36.9 °C) (Oral)   Resp 15   Ht 5' 11\" (1.803 m)   Wt 170 lb (77.1 kg)   SpO2 98%   BMI 23.71 kg/m²        Screening For Hypertension and Follow-up (#317)   previously diagnosed with hypertension and not applicable for screen      Screening For Tobacco Use and Cessation Intervention (#226):   reports that he has never smoked. He has never used smokeless tobacco.  Non-smoker not applicable for screen      ED COURSE & MEDICAL DECISION MAKING    Pertinent Labs & Imaging studies reviewed. (See chart for details)  Patient reported chest pain. Received nitroglycerin. Has a history of heart disease. He received vaccine yesterday. He is actually fall about 5 days ago. Clinical exam is otherwise benign. Lungs are clear. Heart and abdomen are benign. Unclear etiology of his pain and discomfort is in regards to his chest related to recent fall, related to recent vaccination or chronic angina. Running routine cardiac enzymes and monitoring. Initial blood pressure is elevated. Repeat was normal.  Treat supportively at this time    REASSESSMENT  2:25 AM  Patient rechecked and updated on lab/xray status, progress and results. .  Patient was reassessed and condition was unchanged after tx. No further needs at this time. Exam remains benign. Lab tests are unremarkable. Repeat exam is some shaky legs. He states he has had a normal take some Ativan. Did not take any Ativan tonight. Could be his subsequent just some anxiety versus true vaccine reaction. Not suspicious for acute coronary syndrome stroke mass bleed or other problems. Sending her back to the assisted living after dose of Ativan      FINAL IMPRESSION    1. Atypical chest pain    2.  Adverse effect of vaccine, initial encounter         PATIENT REFERRED TO:  Mike Oh MD  [address] [place] [address]  [phone]    Call   For evaluation      DISCHARGE MEDICATIONS:  New Prescriptions    No medications on file           Sharon Khoury MD  12/01/21 [facility]

## 2022-03-17 ENCOUNTER — HOSPITAL ENCOUNTER (EMERGENCY)
Age: 62
Discharge: HOME | End: 2022-03-17
Attending: FAMILY MEDICINE
Payer: MEDICAID

## 2022-03-17 ENCOUNTER — APPOINTMENT (OUTPATIENT)
Dept: GENERAL RADIOLOGY | Age: 62
End: 2022-03-17
Payer: MEDICAID

## 2022-03-17 VITALS
SYSTOLIC BLOOD PRESSURE: 121 MMHG | HEART RATE: 55 BPM | OXYGEN SATURATION: 97 % | RESPIRATION RATE: 16 BRPM | TEMPERATURE: 97.3 F | DIASTOLIC BLOOD PRESSURE: 60 MMHG

## 2022-03-17 DIAGNOSIS — S62.514A NONDISPLACED FRACTURE OF PROXIMAL PHALANX OF RIGHT THUMB, INITIAL ENCOUNTER FOR CLOSED FRACTURE: Primary | ICD-10-CM

## 2022-03-17 PROCEDURE — 99283 EMERGENCY DEPT VISIT LOW MDM: CPT

## 2022-03-17 PROCEDURE — 73140 X-RAY EXAM OF FINGER(S): CPT

## 2022-03-17 ASSESSMENT — PAIN DESCRIPTION - LOCATION: LOCATION: FINGER (COMMENT WHICH ONE)

## 2022-03-17 ASSESSMENT — PAIN DESCRIPTION - ORIENTATION: ORIENTATION: RIGHT

## 2022-03-17 ASSESSMENT — PAIN DESCRIPTION - DIRECTION: RADIATING_TOWARDS: THUMB

## 2022-03-17 ASSESSMENT — ENCOUNTER SYMPTOMS
VOMITING: 0
NAUSEA: 0
COLOR CHANGE: 0

## 2022-03-17 ASSESSMENT — PAIN DESCRIPTION - PAIN TYPE: TYPE: ACUTE PAIN

## 2022-03-17 ASSESSMENT — PAIN SCALES - GENERAL: PAINLEVEL_OUTOF10: 5

## 2022-03-17 NOTE — ED PROVIDER NOTES
[facility]  eMERGENCY dEPARTMENT eNCOUnter          [address] [facility]       Chief Complaint   Patient presents with    Hand Injury     right thumb       Nurses Notes reviewed and I agree except as noted in the HPI. HISTORY OF PRESENT ILLNESS    Stephan Workman is a 57 y.o. male who presents with right thumb pain. Patient notes just prior to arrrival smashed thumb while working on lawnmower. Notes small abrasion to involved thumb. Pain moderate in intensity. REVIEW OF SYSTEMS     Review of Systems   Constitutional: Positive for activity change. Negative for chills and fever. Gastrointestinal: Negative for nausea and vomiting. Musculoskeletal: Positive for arthralgias (right thumb ). Skin: Positive for wound. Negative for color change. Hematological: Does not bruise/bleed easily. All other systems reviewed and are negative. PAST MEDICAL HISTORY    has a past medical history of Angina at rest [facility]), Anxiety, Anxiety, Asperger's disorder, Asthma, Cervical radiculopathy, CHF (congestive heart failure) ([facility] [address] Depression, Depression, Gall stones, GERD (gastroesophageal reflux disease), Hyperlipidemia, Hypertension, Seizures ([facility] [address] and Tremor. SURGICAL HISTORY      has a past surgical history that includes Cholecystectomy, laparoscopic; Colonoscopy; Neck surgery (1996); Knee arthroscopy (Left, 1984); Esophagus dilation; Colonoscopy; Upper gastrointestinal endoscopy; Abdomen surgery; Endoscopy, colon, diagnostic; Dilatation, esophagus; Cholecystectomy (2002); Cardiac surgery; TURP (2/11/2016); and sigmoidoscopy (Left, 6/30/2020).     CURRENT MEDICATIONS       Previous Medications    ACETAMINOPHEN (TYLENOL) 500 MG TABLET    Take 500 mg by mouth every 4 hours as needed for Pain    ALBUTEROL SULFATE (PROAIR HFA IN)    Inhale 90 mcg into the lungs every 4 hours as needed    AMLODIPINE (NORVASC) 5 MG TABLET    Take 1 tablet by mouth daily    ARTIFICIAL TEAR OP    Apply to eye 1 drop in each eye 2-4 times per day    ASPIRIN 81 MG TABLET    Take 81 mg by mouth daily    BENZONATATE (TESSALON) 100 MG CAPSULE    Take 100 mg by mouth daily. CALCIUM CARBONATE (TUMS) 500 MG CHEWABLE TABLET    Take 1 tablet by mouth as needed for Heartburn    CITALOPRAM (CELEXA) 40 MG TABLET    Take 1 tablet by mouth daily. CLOPIDOGREL (PLAVIX) 75 MG TABLET    Take 75 mg by mouth daily    CYCLOBENZAPRINE (FLEXERIL) 10 MG TABLET    Take 10 mg by mouth 2 times daily as needed for Muscle spasms    DOCUSATE SODIUM (COLACE) 100 MG CAPSULE    Take 100 mg by mouth 2 times daily    DOXEPIN (SINEQUAN) 25 MG CAPSULE    Take 25 mg by mouth nightly    FINASTERIDE (PROSCAR) 5 MG TABLET    Take 5 mg by mouth daily    GUAIFENESIN (MUCUS RELIEF) 600 MG EXTENDED RELEASE TABLET    Take 1,200 mg by mouth 2 times daily    HYDROCODONE-ACETAMINOPHEN (NORCO) 5-325 MG PER TABLET    Take 1 tablet by mouth every 6 hours as needed for Pain . HYDROXYZINE (ATARAX) 25 MG TABLET    Take 25 mg by mouth 4 times daily as needed for Itching    IPRATROPIUM-ALBUTEROL (DUONEB) 0.5-2.5 (3) MG/3ML SOLN NEBULIZER SOLUTION    Inhale 3 mLs into the lungs every 4 hours as needed for Shortness of Breath    ISOSORBIDE MONONITRATE (IMDUR) 30 MG CR TABLET    Take 0.5 tablets by mouth daily. LACTOBACILLUS ACIDOPHILUS (FLORANEX)    Take 1 tablet by mouth 3 times daily    LOPERAMIDE (IMODIUM) 2 MG CAPSULE    Take 2 mg by mouth as needed for Diarrhea    LORATADINE (CLARITIN) 10 MG TABLET    Take 10 mg by mouth daily. METOPROLOL TARTRATE (LOPRESSOR) 25 MG TABLET    Take 25 mg by mouth 2 times daily 1/2 tablet    MONTELUKAST SODIUM (SINGULAIR PO)      Take 10 mg by mouth daily     NITROGLYCERIN (NITROSTAT) 0.4 MG SL TABLET    Place 1 tablet under the tongue every 5 minutes as needed for Chest pain.     ONDANSETRON (ZOFRAN) 4 MG TABLET      Take 8 mg by mouth every 12 hours as needed     PANTOPRAZOLE (PROTONIX) 40 MG TABLET      Take 40 mg by mouth 2 times daily     POLYETHYLENE GLYCOL (GLYCOLAX) PACKET    Take 17 g by mouth 2 times daily. PRAVASTATIN (PRAVACHOL) 40 MG TABLET      Take 40 mg by mouth nightly     PROMETHAZINE (PHENERGAN) 25 MG TABLET    Take 25 mg by mouth every 6 hours as needed for Nausea    QUETIAPINE (SEROQUEL) 25 MG TABLET    Take 500 mg by mouth nightly Patient takes 1 200 mg tablet + 1 300 mg tablet at bedtime    SOLIFENACIN (VESICARE) 10 MG TABLET    Take 5 mg by mouth daily    SUCRALFATE (CARAFATE) 1 GM/10ML SUSPENSION      Take 1 g by mouth 4 times daily (before meals and nightly)     SYNTHROID 88 MCG TABLET    Take one tablet 7 days/week with extra 1/2 tablet one day/week    TAMSULOSIN (FLOMAX) 0.4 MG CAPSULE    Take 1 capsule by mouth 2 times daily 2 tablets at bedtime    TOPIRAMATE (TOPAMAX) 100 MG TABLET    Take 100 mg by mouth 2 times daily    TRAMADOL (ULTRAM) 50 MG TABLET    Take 50 mg by mouth every 8 hours as needed for Pain. VITAMIN D (CHOLECALCIFEROL) 1000 UNIT TABS TABLET    Take 1 tablet by mouth daily. ZOLPIDEM (AMBIEN CR) 12.5 MG CR TABLET    Take 10 mg by mouth nightly as needed for Sleep. ALLERGIES     is allergic to morphine, caffeine, percodan [oxycodone-aspirin], tape [adhesive tape], zyban [bupropion hcl], dicyclomine hcl, and oxycodone. FAMILY HISTORY     He indicated that his mother is . He indicated that his father is alive. He indicated that his sister is alive. He indicated that his brother is alive. family history includes Arthritis in his father; Asthma in his brother; Cancer in his father and mother; Depression in his father; Diabetes in his father and mother; Heart Disease in his father and mother; High Blood Pressure in his mother and sister; High Cholesterol in his mother and sister; Mental Retardation in his brother; Obesity in his brother; Substance Abuse in his father. SOCIAL HISTORY      reports that he has never smoked.  He has never used smokeless tobacco. He reports that he does not drink alcohol and does not use drugs. PHYSICAL EXAM     INITIAL VITALS:  temporal temperature is 97.3 °F (36.3 °C). His blood pressure is 121/60 and his pulse is 55. His respiration is 16 and oxygen saturation is 97%. Physical Exam  Vitals and nursing note reviewed. Constitutional:       General: He is not in acute distress. HENT:      Head: Normocephalic and atraumatic. Musculoskeletal:         General: Swelling, tenderness (right proximal thumb) and signs of injury present. No deformity. Skin:     Capillary Refill: Capillary refill takes less than 2 seconds. Comments: Abrasion right proximal thumb    Neurological:      Mental Status: He is alert. Sensory: No sensory deficit. DIFFERENTIAL DIAGNOSIS:   Fracture right thumb,contusion,abrasion    DIAGNOSTIC RESULTS         RADIOLOGY: non-plain film images(s) such as CT, Ultrasound and MRI are read by the radiologist.        XR FINGER RIGHT (MIN 2 VIEWS) (Final result)  Result time 03/17/22 12:47:02  Final result by Joya Strickland MD (03/17/22 12:47:02)                Impression:    1.   1. Nondisplaced fracture in the first proximal phalanx. Lisa Pichardo **This report has been created using voice recognition software. It may contain minor errors which are inherent in voice recognition technology. **     Final report electronically signed by DR Doyle Alejandra on 3/17/2022 12:47 PM            Narrative:    PROCEDURE: XR FINGER RIGHT (MIN 2 VIEWS)     CLINICAL INFORMATION: Right thumb r/o fx . COMPARISON: Plain radiographs dated 17th July 2013. Lisa Pichardo TECHNIQUE: An AP view of the right hand and 2 views of the first digit were obtained. FINDINGS:     There is a nondisplaced fracture in the first proximal phalanx. The remaining bony structures are intact.      . The remaining soft tissues are normal.                   LABS:   Labs Reviewed - No data to display    EMERGENCY DEPARTMENT COURSE:   Vitals:    Vitals:    03/17/22 1224   BP: 121/60   Pulse: 55   Resp: 16   Temp: 97.3 °F (36.3 °C)   TempSrc: Temporal   SpO2: 97%     Right thumb abrasion noted. Pain noted proximal right thumb. X ray shows non displaced fracture in there proximal thumb. Splint applied. No neurovascular compromise. Care instructions provided. Follow up with PCP for recheck. PROCEDURES:  None    FINAL IMPRESSION      1. Nondisplaced fracture of proximal phalanx of right thumb, initial encounter for closed fracture          DISPOSITION/PLAN   Home. Care instructions provided. Follow up with PCP in one week for recheck.      PATIENT REFERRED TO:  Isatu Carvalho, [address] [place] [address]  [phone]    Schedule an appointment as soon as possible for a visit in 1 week        DISCHARGE MEDICATIONS:  New Prescriptions    No medications on file       (Please note that portions of this note were completed with a voice recognition program.  Efforts were made to edit the dictations but occasionally words are mis-transcribed.)    MD Son Orozco MD  03/17/22 [id number]

## 2022-04-06 ENCOUNTER — APPOINTMENT (OUTPATIENT)
Dept: CT IMAGING | Age: 62
DRG: 199 | End: 2022-04-06
Payer: MEDICAID

## 2022-04-06 ENCOUNTER — APPOINTMENT (OUTPATIENT)
Dept: GENERAL RADIOLOGY | Age: 62
DRG: 199 | End: 2022-04-06
Payer: MEDICAID

## 2022-04-06 ENCOUNTER — HOSPITAL ENCOUNTER (INPATIENT)
Age: 62
LOS: 1 days | Discharge: HOME | DRG: 199 | End: 2022-04-09
Attending: EMERGENCY MEDICINE | Admitting: INTERNAL MEDICINE
Payer: MEDICAID

## 2022-04-06 DIAGNOSIS — R07.9 CHEST PAIN, UNSPECIFIED TYPE: ICD-10-CM

## 2022-04-06 DIAGNOSIS — I95.1 ORTHOSTATIC HYPOTENSION: Primary | ICD-10-CM

## 2022-04-06 DIAGNOSIS — S39.012A BACK STRAIN, INITIAL ENCOUNTER: ICD-10-CM

## 2022-04-06 LAB
ANION GAP SERPL CALCULATED.3IONS-SCNC: 12 MEQ/L (ref 8–16)
BASOPHILS # BLD: 0.4 %
BASOPHILS ABSOLUTE: 0 THOU/MM3 (ref 0–0.1)
BUN BLDV-MCNC: 28 MG/DL (ref 7–22)
CALCIUM SERPL-MCNC: 8.9 MG/DL (ref 8.5–10.5)
CHLORIDE BLD-SCNC: 109 MEQ/L (ref 98–111)
CO2: 19 MEQ/L (ref 23–33)
CREAT SERPL-MCNC: 1.1 MG/DL (ref 0.4–1.2)
EKG ATRIAL RATE: 103 BPM
EKG ATRIAL RATE: 108 BPM
EKG P AXIS: 48 DEGREES
EKG P AXIS: 60 DEGREES
EKG P-R INTERVAL: 138 MS
EKG P-R INTERVAL: 142 MS
EKG Q-T INTERVAL: 354 MS
EKG Q-T INTERVAL: 408 MS
EKG QRS DURATION: 122 MS
EKG QRS DURATION: 126 MS
EKG QTC CALCULATION (BAZETT): 463 MS
EKG QTC CALCULATION (BAZETT): 546 MS
EKG R AXIS: -16 DEGREES
EKG R AXIS: -17 DEGREES
EKG T AXIS: 58 DEGREES
EKG T AXIS: 63 DEGREES
EKG VENTRICULAR RATE: 103 BPM
EKG VENTRICULAR RATE: 108 BPM
EOSINOPHIL # BLD: 0 %
EOSINOPHILS ABSOLUTE: 0 THOU/MM3 (ref 0–0.4)
ERYTHROCYTE [DISTWIDTH] IN BLOOD BY AUTOMATED COUNT: 12.6 % (ref 11.5–14.5)
ERYTHROCYTE [DISTWIDTH] IN BLOOD BY AUTOMATED COUNT: 45.1 FL (ref 35–45)
GFR SERPL CREATININE-BSD FRML MDRD: 67 ML/MIN/1.73M2
GLUCOSE BLD-MCNC: 139 MG/DL (ref 70–108)
HCT VFR BLD CALC: 45.1 % (ref 42–52)
HEMOGLOBIN: 14.6 GM/DL (ref 14–18)
IMMATURE GRANS (ABS): 0.02 THOU/MM3 (ref 0–0.07)
IMMATURE GRANULOCYTES: 0.2 %
LYMPHOCYTES # BLD: 3.7 %
LYMPHOCYTES ABSOLUTE: 0.3 THOU/MM3 (ref 1–4.8)
MCH RBC QN AUTO: 31.2 PG (ref 26–33)
MCHC RBC AUTO-ENTMCNC: 32.4 GM/DL (ref 32.2–35.5)
MCV RBC AUTO: 96.4 FL (ref 80–94)
MONOCYTES # BLD: 4.3 %
MONOCYTES ABSOLUTE: 0.4 THOU/MM3 (ref 0.4–1.3)
NUCLEATED RED BLOOD CELLS: 0 /100 WBC
OSMOLALITY CALCULATION: 287.1 MOSMOL/KG (ref 275–300)
PLATELET # BLD: 217 THOU/MM3 (ref 130–400)
PMV BLD AUTO: 10.3 FL (ref 9.4–12.4)
POTASSIUM REFLEX MAGNESIUM: 4.5 MEQ/L (ref 3.5–5.2)
PRO-BNP: 531.9 PG/ML (ref 0–900)
RBC # BLD: 4.68 MILL/MM3 (ref 4.7–6.1)
SEG NEUTROPHILS: 91.4 %
SEGMENTED NEUTROPHILS ABSOLUTE COUNT: 7.9 THOU/MM3 (ref 1.8–7.7)
SODIUM BLD-SCNC: 140 MEQ/L (ref 135–145)
TROPONIN T: < 0.01 NG/ML
TSH SERPL DL<=0.05 MIU/L-ACNC: 0.63 UIU/ML (ref 0.4–4.2)
WBC # BLD: 8.6 THOU/MM3 (ref 4.8–10.8)

## 2022-04-06 PROCEDURE — 93010 ELECTROCARDIOGRAM REPORT: CPT | Performed by: INTERNAL MEDICINE

## 2022-04-06 PROCEDURE — 71045 X-RAY EXAM CHEST 1 VIEW: CPT

## 2022-04-06 PROCEDURE — 96361 HYDRATE IV INFUSION ADD-ON: CPT

## 2022-04-06 PROCEDURE — 93005 ELECTROCARDIOGRAM TRACING: CPT | Performed by: EMERGENCY MEDICINE

## 2022-04-06 PROCEDURE — 85025 COMPLETE CBC W/AUTO DIFF WBC: CPT

## 2022-04-06 PROCEDURE — 2580000003 HC RX 258: Performed by: STUDENT IN AN ORGANIZED HEALTH CARE EDUCATION/TRAINING PROGRAM

## 2022-04-06 PROCEDURE — 99285 EMERGENCY DEPT VISIT HI MDM: CPT

## 2022-04-06 PROCEDURE — 93005 ELECTROCARDIOGRAM TRACING: CPT | Performed by: STUDENT IN AN ORGANIZED HEALTH CARE EDUCATION/TRAINING PROGRAM

## 2022-04-06 PROCEDURE — 6370000000 HC RX 637 (ALT 250 FOR IP): Performed by: STUDENT IN AN ORGANIZED HEALTH CARE EDUCATION/TRAINING PROGRAM

## 2022-04-06 PROCEDURE — 84443 ASSAY THYROID STIM HORMONE: CPT

## 2022-04-06 PROCEDURE — G0378 HOSPITAL OBSERVATION PER HR: HCPCS

## 2022-04-06 PROCEDURE — 83880 ASSAY OF NATRIURETIC PEPTIDE: CPT

## 2022-04-06 PROCEDURE — 80048 BASIC METABOLIC PNL TOTAL CA: CPT

## 2022-04-06 PROCEDURE — 6370000000 HC RX 637 (ALT 250 FOR IP): Performed by: REGISTERED NURSE

## 2022-04-06 PROCEDURE — 71275 CT ANGIOGRAPHY CHEST: CPT

## 2022-04-06 PROCEDURE — 6360000004 HC RX CONTRAST MEDICATION: Performed by: INTERNAL MEDICINE

## 2022-04-06 PROCEDURE — 96360 HYDRATION IV INFUSION INIT: CPT

## 2022-04-06 PROCEDURE — 2580000003 HC RX 258: Performed by: REGISTERED NURSE

## 2022-04-06 PROCEDURE — 84484 ASSAY OF TROPONIN QUANT: CPT

## 2022-04-06 RX ORDER — DOXEPIN HYDROCHLORIDE 25 MG/1
25 CAPSULE ORAL NIGHTLY
Status: DISCONTINUED | OUTPATIENT
Start: 2022-04-06 | End: 2022-04-09 | Stop reason: HOSPADM

## 2022-04-06 RX ORDER — LEVOTHYROXINE SODIUM 88 UG/1
88 TABLET ORAL DAILY
Status: DISCONTINUED | OUTPATIENT
Start: 2022-04-07 | End: 2022-04-09 | Stop reason: HOSPADM

## 2022-04-06 RX ORDER — NITROGLYCERIN 0.4 MG/1
0.4 TABLET SUBLINGUAL EVERY 5 MIN PRN
Status: DISCONTINUED | OUTPATIENT
Start: 2022-04-06 | End: 2022-04-09 | Stop reason: HOSPADM

## 2022-04-06 RX ORDER — CALCIUM CARBONATE 200(500)MG
1 TABLET,CHEWABLE ORAL PRN
Status: DISCONTINUED | OUTPATIENT
Start: 2022-04-06 | End: 2022-04-09 | Stop reason: HOSPADM

## 2022-04-06 RX ORDER — TAMSULOSIN HYDROCHLORIDE 0.4 MG/1
0.4 CAPSULE ORAL 2 TIMES DAILY
Status: DISCONTINUED | OUTPATIENT
Start: 2022-04-06 | End: 2022-04-09 | Stop reason: HOSPADM

## 2022-04-06 RX ORDER — TOPIRAMATE 100 MG/1
100 TABLET, FILM COATED ORAL 2 TIMES DAILY
Status: DISCONTINUED | OUTPATIENT
Start: 2022-04-06 | End: 2022-04-09 | Stop reason: HOSPADM

## 2022-04-06 RX ORDER — SOLIFENACIN SUCCINATE 10 MG/1
5 TABLET, FILM COATED ORAL DAILY
Status: DISCONTINUED | OUTPATIENT
Start: 2022-04-07 | End: 2022-04-06 | Stop reason: CLARIF

## 2022-04-06 RX ORDER — SUCRALFATE 1 G/1
1 TABLET ORAL
Status: DISCONTINUED | OUTPATIENT
Start: 2022-04-06 | End: 2022-04-09 | Stop reason: HOSPADM

## 2022-04-06 RX ORDER — PRAVASTATIN SODIUM 40 MG
40 TABLET ORAL NIGHTLY
Status: DISCONTINUED | OUTPATIENT
Start: 2022-04-06 | End: 2022-04-09 | Stop reason: HOSPADM

## 2022-04-06 RX ORDER — FINASTERIDE 5 MG/1
5 TABLET, FILM COATED ORAL DAILY
Status: DISCONTINUED | OUTPATIENT
Start: 2022-04-07 | End: 2022-04-09 | Stop reason: HOSPADM

## 2022-04-06 RX ORDER — ALBUTEROL SULFATE 90 UG/1
2 AEROSOL, METERED RESPIRATORY (INHALATION) EVERY 4 HOURS PRN
Status: DISCONTINUED | OUTPATIENT
Start: 2022-04-06 | End: 2022-04-09 | Stop reason: HOSPADM

## 2022-04-06 RX ORDER — ISOSORBIDE MONONITRATE 30 MG/1
15 TABLET, EXTENDED RELEASE ORAL DAILY
Status: DISCONTINUED | OUTPATIENT
Start: 2022-04-07 | End: 2022-04-09 | Stop reason: HOSPADM

## 2022-04-06 RX ORDER — CLOPIDOGREL BISULFATE 75 MG/1
75 TABLET ORAL DAILY
Status: DISCONTINUED | OUTPATIENT
Start: 2022-04-07 | End: 2022-04-09 | Stop reason: HOSPADM

## 2022-04-06 RX ORDER — POLYETHYLENE GLYCOL 3350 17 G/17G
17 POWDER, FOR SOLUTION ORAL 2 TIMES DAILY
Status: DISCONTINUED | OUTPATIENT
Start: 2022-04-06 | End: 2022-04-09 | Stop reason: HOSPADM

## 2022-04-06 RX ORDER — ZOLPIDEM TARTRATE 12.5 MG/1
12.5 TABLET, FILM COATED, EXTENDED RELEASE ORAL NIGHTLY PRN
Status: DISCONTINUED | OUTPATIENT
Start: 2022-04-06 | End: 2022-04-06 | Stop reason: CLARIF

## 2022-04-06 RX ORDER — VITAMIN B COMPLEX
1000 TABLET ORAL DAILY
Status: DISCONTINUED | OUTPATIENT
Start: 2022-04-07 | End: 2022-04-09 | Stop reason: HOSPADM

## 2022-04-06 RX ORDER — ASPIRIN 81 MG/1
81 TABLET ORAL DAILY
Status: DISCONTINUED | OUTPATIENT
Start: 2022-04-07 | End: 2022-04-09 | Stop reason: HOSPADM

## 2022-04-06 RX ORDER — PROMETHAZINE HYDROCHLORIDE 25 MG/1
25 TABLET ORAL EVERY 6 HOURS PRN
Status: DISCONTINUED | OUTPATIENT
Start: 2022-04-06 | End: 2022-04-09 | Stop reason: HOSPADM

## 2022-04-06 RX ORDER — SODIUM CHLORIDE 9 MG/ML
INJECTION, SOLUTION INTRAVENOUS CONTINUOUS
Status: DISCONTINUED | OUTPATIENT
Start: 2022-04-06 | End: 2022-04-07

## 2022-04-06 RX ORDER — LACTOBACILLUS RHAMNOSUS GG 10B CELL
1 CAPSULE ORAL 3 TIMES DAILY
Status: DISCONTINUED | OUTPATIENT
Start: 2022-04-06 | End: 2022-04-09 | Stop reason: HOSPADM

## 2022-04-06 RX ORDER — 0.9 % SODIUM CHLORIDE 0.9 %
1000 INTRAVENOUS SOLUTION INTRAVENOUS ONCE
Status: COMPLETED | OUTPATIENT
Start: 2022-04-06 | End: 2022-04-06

## 2022-04-06 RX ORDER — LEVOTHYROXINE SODIUM 88 UG/1
44 TABLET ORAL WEEKLY
Status: DISCONTINUED | OUTPATIENT
Start: 2022-04-13 | End: 2022-04-09 | Stop reason: HOSPADM

## 2022-04-06 RX ORDER — ASPIRIN 81 MG/1
324 TABLET, CHEWABLE ORAL ONCE
Status: COMPLETED | OUTPATIENT
Start: 2022-04-06 | End: 2022-04-06

## 2022-04-06 RX ORDER — LIDOCAINE 4 G/G
1 PATCH TOPICAL ONCE
Status: COMPLETED | OUTPATIENT
Start: 2022-04-06 | End: 2022-04-07

## 2022-04-06 RX ORDER — ZOLPIDEM TARTRATE 5 MG/1
5 TABLET ORAL NIGHTLY PRN
Status: DISCONTINUED | OUTPATIENT
Start: 2022-04-07 | End: 2022-04-09 | Stop reason: HOSPADM

## 2022-04-06 RX ORDER — DOCUSATE SODIUM 100 MG/1
100 CAPSULE, LIQUID FILLED ORAL 2 TIMES DAILY
Status: DISCONTINUED | OUTPATIENT
Start: 2022-04-06 | End: 2022-04-09 | Stop reason: HOSPADM

## 2022-04-06 RX ORDER — CITALOPRAM 40 MG/1
40 TABLET ORAL DAILY
Status: DISCONTINUED | OUTPATIENT
Start: 2022-04-07 | End: 2022-04-09 | Stop reason: HOSPADM

## 2022-04-06 RX ORDER — PANTOPRAZOLE SODIUM 40 MG/1
40 TABLET, DELAYED RELEASE ORAL 2 TIMES DAILY
Status: DISCONTINUED | OUTPATIENT
Start: 2022-04-06 | End: 2022-04-09 | Stop reason: HOSPADM

## 2022-04-06 RX ORDER — CYCLOBENZAPRINE HCL 10 MG
10 TABLET ORAL 2 TIMES DAILY PRN
Status: DISCONTINUED | OUTPATIENT
Start: 2022-04-06 | End: 2022-04-09 | Stop reason: HOSPADM

## 2022-04-06 RX ORDER — LORAZEPAM 1 MG/1
1 TABLET ORAL DAILY
COMMUNITY

## 2022-04-06 RX ORDER — TROSPIUM CHLORIDE 20 MG/1
20 TABLET, FILM COATED ORAL
Status: DISCONTINUED | OUTPATIENT
Start: 2022-04-07 | End: 2022-04-09 | Stop reason: HOSPADM

## 2022-04-06 RX ORDER — ACETAMINOPHEN 325 MG/1
650 TABLET ORAL ONCE
Status: COMPLETED | OUTPATIENT
Start: 2022-04-06 | End: 2022-04-06

## 2022-04-06 RX ORDER — AMLODIPINE BESYLATE 5 MG/1
5 TABLET ORAL DAILY
Status: DISCONTINUED | OUTPATIENT
Start: 2022-04-07 | End: 2022-04-09 | Stop reason: HOSPADM

## 2022-04-06 RX ORDER — TRAMADOL HYDROCHLORIDE 50 MG/1
50 TABLET ORAL EVERY 8 HOURS PRN
Status: DISCONTINUED | OUTPATIENT
Start: 2022-04-06 | End: 2022-04-09 | Stop reason: HOSPADM

## 2022-04-06 RX ORDER — PROMETHAZINE HYDROCHLORIDE 25 MG/1
25 TABLET ORAL EVERY 6 HOURS PRN
COMMUNITY

## 2022-04-06 RX ORDER — ACETAMINOPHEN 500 MG
500 TABLET ORAL EVERY 4 HOURS PRN
Status: DISCONTINUED | OUTPATIENT
Start: 2022-04-06 | End: 2022-04-09 | Stop reason: HOSPADM

## 2022-04-06 RX ADMIN — ACETAMINOPHEN 650 MG: 325 TABLET ORAL at 15:09

## 2022-04-06 RX ADMIN — PRAVASTATIN SODIUM 40 MG: 40 TABLET ORAL at 22:36

## 2022-04-06 RX ADMIN — DOCUSATE SODIUM 100 MG: 100 CAPSULE, LIQUID FILLED ORAL at 22:36

## 2022-04-06 RX ADMIN — PANTOPRAZOLE SODIUM 40 MG: 40 TABLET, DELAYED RELEASE ORAL at 22:36

## 2022-04-06 RX ADMIN — TOPIRAMATE 100 MG: 100 TABLET, FILM COATED ORAL at 22:36

## 2022-04-06 RX ADMIN — IOPAMIDOL 80 ML: 755 INJECTION, SOLUTION INTRAVENOUS at 20:56

## 2022-04-06 RX ADMIN — TAMSULOSIN HYDROCHLORIDE 0.4 MG: 0.4 CAPSULE ORAL at 22:36

## 2022-04-06 RX ADMIN — SODIUM CHLORIDE: 9 INJECTION, SOLUTION INTRAVENOUS at 22:35

## 2022-04-06 RX ADMIN — POLYETHYLENE GLYCOL 3350 17 G: 17 POWDER, FOR SOLUTION ORAL at 22:36

## 2022-04-06 RX ADMIN — SUCRALFATE 1 G: 1 TABLET ORAL at 22:36

## 2022-04-06 RX ADMIN — ASPIRIN 81 MG 324 MG: 81 TABLET ORAL at 15:09

## 2022-04-06 RX ADMIN — Medication 1 CAPSULE: at 22:36

## 2022-04-06 RX ADMIN — CYCLOBENZAPRINE 10 MG: 10 TABLET, FILM COATED ORAL at 22:42

## 2022-04-06 RX ADMIN — SODIUM CHLORIDE 1000 ML: 9 INJECTION, SOLUTION INTRAVENOUS at 18:13

## 2022-04-06 RX ADMIN — SODIUM CHLORIDE 1000 ML: 9 INJECTION, SOLUTION INTRAVENOUS at 14:17

## 2022-04-06 RX ADMIN — TRAMADOL HYDROCHLORIDE 50 MG: 50 TABLET ORAL at 22:42

## 2022-04-06 RX ADMIN — DOXEPIN HYDROCHLORIDE 25 MG: 25 CAPSULE ORAL at 22:36

## 2022-04-06 ASSESSMENT — PAIN DESCRIPTION - ORIENTATION
ORIENTATION: MID
ORIENTATION: MID

## 2022-04-06 ASSESSMENT — PAIN DESCRIPTION - LOCATION
LOCATION: BACK
LOCATION: BACK

## 2022-04-06 ASSESSMENT — PAIN DESCRIPTION - PAIN TYPE
TYPE: CHRONIC PAIN
TYPE: CHRONIC PAIN

## 2022-04-06 ASSESSMENT — ENCOUNTER SYMPTOMS
VOMITING: 0
SINUS PAIN: 0
ABDOMINAL PAIN: 0
EYE PAIN: 0
CONSTIPATION: 0
DIARRHEA: 0
BACK PAIN: 1
NAUSEA: 0
COUGH: 0
SHORTNESS OF BREATH: 0

## 2022-04-06 ASSESSMENT — PAIN DESCRIPTION - ONSET: ONSET: ON-GOING

## 2022-04-06 ASSESSMENT — PAIN SCALES - GENERAL
PAINLEVEL_OUTOF10: 5
PAINLEVEL_OUTOF10: 6
PAINLEVEL_OUTOF10: 7
PAINLEVEL_OUTOF10: 2
PAINLEVEL_OUTOF10: 8

## 2022-04-06 ASSESSMENT — PAIN - FUNCTIONAL ASSESSMENT
PAIN_FUNCTIONAL_ASSESSMENT: 0-10
PAIN_FUNCTIONAL_ASSESSMENT: PREVENTS OR INTERFERES SOME ACTIVE ACTIVITIES AND ADLS

## 2022-04-06 ASSESSMENT — PAIN DESCRIPTION - FREQUENCY: FREQUENCY: CONTINUOUS

## 2022-04-06 ASSESSMENT — PAIN DESCRIPTION - PROGRESSION: CLINICAL_PROGRESSION: NOT CHANGED

## 2022-04-06 ASSESSMENT — HEART SCORE: ECG: 1

## 2022-04-06 ASSESSMENT — PAIN DESCRIPTION - DESCRIPTORS: DESCRIPTORS: ACHING

## 2022-04-06 NOTE — ED NOTES
Orthostatic BP done at this time. Did not complete standing portion of test due to 87/58 sitting blood pressure and pt reports of feeling dizzy.       Berry Harrison  04/06/22 3353

## 2022-04-06 NOTE — ED NOTES
Pt wanted to use the bathroom and was brought to the bathroom via wheelchair. Pt felt too winded to go once at the bathroom and became unresponsive in the hallway. Pt was pale and eyes were closed in the wheelchair. Pt regained some color and started responding to verbal prompts once back in bed.       Pamula Claude  04/06/22 1527

## 2022-04-06 NOTE — ED NOTES
Pt used urinal in bed and is now resting with lights off. VSS, respirations deep and unlabored. Comfort measures performed and call light in reach.       Ambreen Venegas  04/06/22 0365

## 2022-04-06 NOTE — ED TRIAGE NOTES
Pt arrived to 2 via squad c/o back pain and chest pain. Pt reports back pain starting yesterday throughout the day and getting worse at night. Pt took a nitro for his chest pain last night and that helped the pain. Pt reports having a history of angina and reports this situation happening in the past. VSS with deep and unlabored respirations.  Call light in reach

## 2022-04-06 NOTE — ED PROVIDER NOTES
[address] [address]          Pt Name: Liliya Burr  MRN: [id number]  [place] 1960  Date of evaluation: 4/6/2022  Treating Resident Physician: Michael Arriaga MD  Supervising Physician: Dr. Salty Beckham, [facility] 0226       Chief Complaint   Patient presents with    Back Pain    Chest Pain     History obtained from the patient. HISTORY OF PRESENT ILLNESS    HPI  Liliya Burr is a 57 y.o. male who presents to the emergency department for evaluation of chest pain and back pain. Patient states that 3 days ago he has been having chest pressure intermittently. He states that he is using nitro with some improvement in his symptoms. He states it feels like his previous angina pain. He dates he has had heart issues in the past and is on Plavix and aspirin and is compliant. States that his chest pain is worse with exertion and it radiates to his left arm. He states that 2 days ago he began having lower back pain. He states this started after he was helping move a lot of furniture and other heavy objects around. Denies any falls or trauma. Denies any numbness weakness or tingling. States that the back pain is mostly in the lower back on the left. Denies any syncope or lightheadedness or dizziness. He denies any headache fever chills. Denies any cough or shortness of breath. Denies any abdominal pain nausea vomiting diarrhea constipation or leg swelling. Patient denies any new Headache, Fever, Chills, Cough, Shortness of breath, Abdominal pain, Nausea, Vomiting, Diarrhea, Constipation and Leg swelling. The patient has no other acute complaints at this time. REVIEW OF SYSTEMS   Review of Systems   Constitutional: Negative for chills and fever. HENT: Negative for ear pain and sinus pain. Eyes: Negative for pain. Respiratory: Negative for cough and shortness of breath.     Cardiovascular: Positive for chest pain. Negative for leg swelling. Gastrointestinal: Negative for abdominal pain, constipation, diarrhea, nausea and vomiting. Genitourinary: Negative for dysuria and flank pain. Musculoskeletal: Positive for back pain. Negative for neck pain. Skin: Negative for wound. Neurological: Negative for headaches. Psychiatric/Behavioral: Negative for confusion.          PAST MEDICAL AND SURGICAL HISTORY     Past Medical History:   Diagnosis Date    Angina at rest [facility])     intermittent    Anxiety     Anxiety     Asperger's disorder     Asthma     Cervical radiculopathy     CHF [facility]congestive heart failure) ([facility])     Depression     Depression     Gall stones     GERD (gastroesophageal reflux disease)     Hyperlipidemia     Hypertension     Seizures (HCC)     Tremor      Past Surgical History:   Procedure Laterality Date    ABDOMEN SURGERY      CARDIAC SURGERY      Heart Cath    CHOLECYSTECTOMY  2002    CHOLECYSTECTOMY, LAPAROSCOPIC      COLONOSCOPY      COLONOSCOPY      DILATATION, ESOPHAGUS      ENDOSCOPY, COLON, DIAGNOSTIC      ESOPHAGEAL DILATATION      KNEE ARTHROSCOPY Left 1984    NECK SURGERY  1996    plate in neck and fusion    SIGMOIDOSCOPY Left 6/30/2020    SIGMOIDOSCOPY DIAGNOSTIC FLEXIBLE performed by Yared Hinkle MD at [facility]    TURP  2/11/2016    UPPER GASTROINTESTINAL ENDOSCOPY           MEDICATIONS     Current Facility-Administered Medications:     lidocaine 4 % external patch 1 patch, 1 patch, TransDERmal, Once, Lázaro Vazquez MD, 1 patch at 04/06/22 1509    Current Outpatient Medications:     amLODIPine (NORVASC) 5 MG tablet, Take 1 tablet by mouth daily, Disp: 30 tablet, Rfl: 0    metoprolol tartrate (LOPRESSOR) 25 MG tablet, Take 25 mg by mouth 2 times daily 1/2 tablet, Disp: , Rfl:     guaiFENesin (MUCUS RELIEF) 600 MG extended release tablet, Take 1,200 mg by mouth 2 times daily, Disp: , Rfl:     finasteride (PROSCAR) 5 MG tablet, Take 5 mg by mouth daily, Disp: , Rfl:     doxepin (SINEQUAN) 25 MG capsule, Take 25 mg by mouth nightly, Disp: , Rfl:     solifenacin (VESICARE) 10 MG tablet, Take 5 mg by mouth daily, Disp: , Rfl:     docusate sodium (COLACE) 100 MG capsule, Take 100 mg by mouth 2 times daily, Disp: , Rfl:     hydrOXYzine (ATARAX) 25 MG tablet, Take 25 mg by mouth 4 times daily as needed for Itching, Disp: , Rfl:     loperamide (IMODIUM) 2 MG capsule, Take 2 mg by mouth as needed for Diarrhea, Disp: , Rfl:     promethazine (PHENERGAN) 25 MG tablet, Take 25 mg by mouth every 6 hours as needed for Nausea, Disp: , Rfl:     Albuterol Sulfate (PROAIR HFA IN), Inhale 90 mcg into the lungs every 4 hours as needed, Disp: , Rfl:     traMADol (ULTRAM) 50 MG tablet, Take 50 mg by mouth every 8 hours as needed for Pain., Disp: , Rfl:     calcium carbonate (TUMS) 500 MG chewable tablet, Take 1 tablet by mouth as needed for Heartburn, Disp: , Rfl:     acetaminophen (TYLENOL) 500 MG tablet, Take 500 mg by mouth every 4 hours as needed for Pain, Disp: , Rfl:     HYDROcodone-acetaminophen (NORCO) 5-325 MG per tablet, Take 1 tablet by mouth every 6 hours as needed for Pain ., Disp: , Rfl:     aspirin 81 MG tablet, Take 81 mg by mouth daily, Disp: , Rfl:     clopidogrel (PLAVIX) 75 MG tablet, Take 75 mg by mouth daily, Disp: , Rfl:     SYNTHROID 88 MCG tablet, Take one tablet 7 days/week with extra 1/2 tablet one day/week, Disp: 30 tablet, Rfl: 5    ipratropium-albuterol (DUONEB) 0.5-2.5 (3) MG/3ML SOLN nebulizer solution, Inhale 3 mLs into the lungs every 4 hours as needed for Shortness of Breath, Disp: 1 vial, Rfl: 0    lactobacillus acidophilus (FLORANEX), Take 1 tablet by mouth 3 times daily, Disp: , Rfl:     QUEtiapine (SEROQUEL) 25 MG tablet, Take 500 mg by mouth nightly Patient takes 1 200 mg tablet + 1 300 mg tablet at bedtime, Disp: , Rfl:     ARTIFICIAL TEAR OP, Apply to eye 1 drop in each eye 2-4 times per day, Disp: , Rfl:     tamsulosin (FLOMAX) 0.4 MG capsule, Take 1 capsule by mouth 2 times daily 2 tablets at bedtime, Disp: 60 capsule, Rfl: 11    cyclobenzaprine (FLEXERIL) 10 MG tablet, Take 10 mg by mouth 2 times daily as needed for Muscle spasms, Disp: , Rfl:     zolpidem (AMBIEN CR) 12.5 MG CR tablet, Take 10 mg by mouth nightly as needed for Sleep. , Disp: , Rfl:     topiramate (TOPAMAX) 100 MG tablet, Take 100 mg by mouth 2 times daily, Disp: , Rfl:     ondansetron (ZOFRAN) 4 MG tablet,  Take 8 mg by mouth every 12 hours as needed , Disp: , Rfl:     pravastatin (PRAVACHOL) 40 MG tablet,  Take 40 mg by mouth nightly , Disp: , Rfl:     vitamin D (CHOLECALCIFEROL) 1000 UNIT TABS tablet, Take 1 tablet by mouth daily. , Disp: 30 tablet, Rfl: 1    citalopram (CELEXA) 40 MG tablet, Take 1 tablet by mouth daily. , Disp: 30 tablet, Rfl: 1    isosorbide mononitrate (IMDUR) 30 MG CR tablet, Take 0.5 tablets by mouth daily. , Disp: 30 tablet, Rfl: 1    nitroGLYCERIN (NITROSTAT) 0.4 MG SL tablet, Place 1 tablet under the tongue every 5 minutes as needed for Chest pain., Disp: 25 tablet, Rfl: 1    polyethylene glycol (GLYCOLAX) packet, Take 17 g by mouth 2 times daily. , Disp: 1200 g, Rfl: 11    pantoprazole (PROTONIX) 40 MG tablet,  Take 40 mg by mouth 2 times daily , Disp: , Rfl:     sucralfate (CARAFATE) 1 GM/10ML suspension,  Take 1 g by mouth 4 times daily (before meals and nightly) , Disp: , Rfl:     Montelukast Sodium (SINGULAIR PO),  Take 10 mg by mouth daily , Disp: , Rfl:     benzonatate (TESSALON) 100 MG capsule, Take 100 mg by mouth daily. , Disp: , Rfl:     loratadine (CLARITIN) 10 MG tablet, Take 10 mg by mouth daily.   , Disp: , Rfl:       SOCIAL HISTORY     Social History     Social History Narrative    Not on file     Social History     Tobacco Use    Smoking status: Never Smoker    Smokeless tobacco: Never Used   Vaping Use    Vaping Use: Never used   Substance Use Topics    Alcohol use: No     Alcohol/week: 0.0 standard drinks    Drug use: No         ALLERGIES     Allergies   Allergen Reactions    Morphine Shortness Of Breath     convulsions    Caffeine      Nervous and shakes all over    Percodan [Oxycodone-Aspirin]     Tape Shoshana  Tape] Dermatitis    Zyban [Bupropion Hcl]     Dicyclomine Hcl Nausea And Vomiting    Oxycodone Nausea And Vomiting         FAMILY HISTORY     Family History   Problem Relation Age of Onset    Cancer Mother     Diabetes Mother     Heart Disease Mother     High Blood Pressure Mother     High Cholesterol Mother     Arthritis Father     Heart Disease Father     Cancer Father     Diabetes Father     Substance Abuse Father     Depression Father     Obesity Brother     Asthma Brother     Mental Retardation Brother     High Blood Pressure Sister     High Cholesterol Sister          PREVIOUS RECORDS   Previous records reviewed: He was seen last on 3/17/2022 for fracture of proximal phalanx. PHYSICAL EXAM     ED Triage Vitals   BP Temp Temp src Pulse Resp SpO2 Height Weight   -- -- -- -- -- -- -- --     Initial vital signs and nursing assessment reviewed and vitals are/show: Afebrile, Normotensive, Tachycardic and Borderline tachypneic  . Pulsoximetry is normal per my interpretation. Additional Vital Signs:  Vitals:    04/06/22 2013   BP: 133/61   Pulse:    Resp:    Temp:    SpO2: 97%       Physical Exam  Constitutional:       General: He is not in acute distress. Appearance: Normal appearance. He is not ill-appearing, toxic-appearing or diaphoretic. HENT:      Head: Normocephalic and atraumatic. Right Ear: External ear normal.      Left Ear: External ear normal.   Eyes:      General: No scleral icterus. Right eye: No discharge. Left eye: No discharge. Cardiovascular:      Rate and Rhythm: Regular rhythm. Tachycardia present. Heart sounds: No murmur heard. No friction rub. No gallop.        Comments: Pulses equal bilateral upper extremities radial  Pulses equal bilateral lower extremity DP  Pulmonary:      Effort: Pulmonary effort is normal. No respiratory distress. Breath sounds: Normal breath sounds. No stridor. No wheezing, rhonchi or rales. Chest:      Chest wall: No tenderness. Abdominal:      General: Abdomen is flat. There is no distension. Palpations: Abdomen is soft. Tenderness: There is no abdominal tenderness. There is no guarding or rebound. Musculoskeletal:      Cervical back: Neck supple. Right lower leg: No edema. Left lower leg: No edema. Comments: No midline cervical thoracic or lumbar spine tenderness palpation  No step-offs or deformities  Left-sided paraspinal lumbar spine tenderness palpation   Skin:     General: Skin is warm and dry. Neurological:      Mental Status: He is alert and oriented to person, place, and time. Mental status is at baseline. Comments: GCS 15  Alert and oriented x3  Spontaneously moves all 4 extremities  Follows commands  Bilateral lower extremity motor sensation intact  Bilateral upper extremity motor and sensation intact   Psychiatric:         Mood and Affect: Mood normal.         Behavior: Behavior normal.         Thought Content:  Thought content normal.         Judgment: Judgment normal.             MEDICAL DECISION MAKING   Initial Assessment:     33-year-old male presenting to the ED for chest pain and back pain    Differential diagnoses include but not limited to: Fracture dislocation strain sprain sciatica ACS ACS, arrhythmia, aortic dissection, cardiac tamponade, pneumothorax, PE, esophageal rupture, pneumonia, CHF, COPD, myocarditis, pericarditis, electrolyte abnormality, valvular heart disease, GERD, musculoskeletal    Plan:       EKG  Labs  Imaging: CXR  IV fluids  Tylenol  Aspirin  Lidocaine patch  HEART Score: 3  Orthostatic vital sings- Positive  Additional IV fluids  Meal provided  Repeat orthostatic vital signs- continue to be positive  Discussed admission with patient, he is agreeable  Admission          Patient is a 57 y.o. male who was seen and evaluated in the emergency department for chest and back pain. Patient back pain likely related to muscle strain as he had recently been moving heavy furniture and his physical exam was not consistent with acute serious pathology. Patient's chest pain was similar to previous CAD chest pain but his heart score is 3, EKG showed no signs of acute ischemia, and lab work was unremarkable. In the ED and treated his pain with IV fluids Tylenol and aspirin and a lidocaine patch which significantly improved his pain. Patient's orthostatic vital signs were positive so I gave him IV fluids. We then retested after IV fluids, and it remained positive. I gave him additional IV fluids and retest orthostatic vital signs and he can remain positive. At this point we felt he might benefit from admission and patient agreed. Patient admitted for orthostatic hypotension                ED RESULTS   Laboratory results:  Labs Reviewed   CBC WITH AUTO DIFFERENTIAL - Abnormal; Notable for the following components:       Result Value    RBC 4.68 (*)     MCV 96.4 (*)     RDW-SD 45.1 (*)     Segs Absolute 7.9 (*)     Lymphocytes Absolute 0.3 (*)     All other components within normal limits   BASIC METABOLIC PANEL W/ REFLEX TO MG FOR LOW K - Abnormal; Notable for the following components:    CO2 19 (*)     Glucose 139 (*)     BUN 28 (*)     All other components within normal limits   GLOMERULAR FILTRATION RATE, ESTIMATED - Abnormal; Notable for the following components:    Est, Glom Filt Rate 67 (*)     All other components within normal limits   TROPONIN   BRAIN NATRIURETIC PEPTIDE   TSH WITH REFLEX   ANION GAP   OSMOLALITY       Radiologic studies results:  XR CHEST PORTABLE   Final Result   1. Areas of abnormal density at both lung bases suggestive of atelectasis or infiltrate stenosis previous study dated 25 November 2021.    2. Postoperative changes in the lower cervical spine. **This report has been created using voice recognition software. It may contain minor errors which are inherent in voice recognition technology. **      Final report electronically signed by DR Primitivo Mchugh on 4/6/2022 2:54 PM          ED Medications administered this visit:   Medications   lidocaine 4 % external patch 1 patch (1 patch TransDERmal Patch Applied 4/6/22 1509)   aspirin chewable tablet 324 mg (324 mg Oral Given 4/6/22 1509)   acetaminophen (TYLENOL) tablet 650 mg (650 mg Oral Given 4/6/22 1509)   0.9 % sodium chloride bolus (0 mLs IntraVENous Stopped 4/6/22 2008)   0.9 % sodium chloride bolus (0 mLs IntraVENous Stopped 4/6/22 1914)         ED COURSE     ED Course as of 04/06/22 2014 Wed Apr 06, 2022   1501 CXR:IMPRESSION:  1. Areas of abnormal density at both lung bases suggestive of atelectasis or infiltrate stenosis previous study dated 25 November 2021.  2. Postoperative changes in the lower cervical spine.  [CR]   1508 CBC is unremarkableBMP shows a CO2 of 19 otherwise unremarkable. Troponin TSH BNP within normal [CR]   1509 Patient states his chest pain is gone. I discussed admission versus discharge with cardiology follow-up, he states he would rather go home and see a heart doctor patient. His cardiologist is Dr. Connor Campuzano [place] need to see one of the local cardiology groups here. He is agreeable to that. [CR]   1600 Per nursing staff, when patient walked to bathroom he became short of breath and pale.   Orthostatic vital signs to be checked, will give additional IV fluids [CR]   1607 Orthostatic VS positive per nursing staff [CR]   1939 Orthostatic VS continue to be positive per nursing staff [CR]   1950 Paged admission to [facility]; accepted [CR]      ED Course User Index  [CR] Sneha Joe MD            MEDICATION CHANGES     New Prescriptions    No medications on file FINAL DISPOSITION     Final diagnoses:   Chest pain, unspecified type   Back strain, initial encounter   Orthostatic hypotension     Condition: condition: stable  Dispo: Admit to med/surg floor      This transcription was electronically signed. Parts of this transcriptions may have been dictated by use of voice recognition software and electronically transcribed, and parts may have been transcribed with the assistance of an ED scribe. The transcription may contain errors not detected in proofreading. Please refer to my supervising physician's documentation if my documentation differs.     Electronically Signed: Melissa Ramon MD, 04/06/22, 8:14 PM         Melissa Ramon MD  Resident  04/06/22 2014

## 2022-04-06 NOTE — ED NOTES
ED nurse-to-nurse bedside report    Chief Complaint   Patient presents with    Back Pain    Chest Pain      LOC: alert and orientated to name, place, date  Vital signs   Vitals:    04/06/22 1601 04/06/22 1632 04/06/22 1808 04/06/22 1817   BP:  102/78  117/87   Pulse:  86 83 76   Resp: 20 18     Temp:       SpO2: 98% 97% 99% 99%   Weight:          Pain:    Pain Interventions: see MAR  Pain Goal: 2/10  Oxygen: No    Current needs required RA   Telemetry: Yes  LDAs:   Peripheral IV 04/06/22 Right Antecubital (Active)   Site Assessment Clean;Dry; Intact 04/06/22 1404   Line Status Blood return noted;Specimen collected; Flushed 04/06/22 1404   Dressing Status Clean; Intact;Dry 04/06/22 1404     Continuous Infusions:   Mobility: Requires assistance * 2  Jang Fall Risk Score: No flowsheet data found.   Fall Interventions: call light in reach, extensive one with transfer, bed in low position with wheels locked  Report given to: Vikas Stokes RN  04/06/22 5172

## 2022-04-07 ENCOUNTER — APPOINTMENT (OUTPATIENT)
Dept: GENERAL RADIOLOGY | Age: 62
DRG: 199 | End: 2022-04-07
Payer: MEDICAID

## 2022-04-07 ENCOUNTER — APPOINTMENT (OUTPATIENT)
Dept: MRI IMAGING | Age: 62
DRG: 199 | End: 2022-04-07
Payer: MEDICAID

## 2022-04-07 ENCOUNTER — APPOINTMENT (OUTPATIENT)
Dept: CT IMAGING | Age: 62
DRG: 199 | End: 2022-04-07
Payer: MEDICAID

## 2022-04-07 LAB
ALBUMIN SERPL-MCNC: 3.3 G/DL (ref 3.5–5.1)
ALP BLD-CCNC: 58 U/L (ref 38–126)
ALT SERPL-CCNC: 15 U/L (ref 11–66)
ANION GAP SERPL CALCULATED.3IONS-SCNC: 10 MEQ/L (ref 8–16)
ANION GAP SERPL CALCULATED.3IONS-SCNC: 9 MEQ/L (ref 8–16)
AST SERPL-CCNC: 22 U/L (ref 5–40)
BASOPHILS # BLD: 0.4 %
BASOPHILS # BLD: 0.7 %
BASOPHILS ABSOLUTE: 0 THOU/MM3 (ref 0–0.1)
BASOPHILS ABSOLUTE: 0 THOU/MM3 (ref 0–0.1)
BILIRUB SERPL-MCNC: 0.2 MG/DL (ref 0.3–1.2)
BUN BLDV-MCNC: 17 MG/DL (ref 7–22)
BUN BLDV-MCNC: 20 MG/DL (ref 7–22)
CALCIUM SERPL-MCNC: 7.6 MG/DL (ref 8.5–10.5)
CALCIUM SERPL-MCNC: 7.6 MG/DL (ref 8.5–10.5)
CHLORIDE BLD-SCNC: 111 MEQ/L (ref 98–111)
CHLORIDE BLD-SCNC: 113 MEQ/L (ref 98–111)
CO2: 16 MEQ/L (ref 23–33)
CO2: 17 MEQ/L (ref 23–33)
CREAT SERPL-MCNC: 0.9 MG/DL (ref 0.4–1.2)
CREAT SERPL-MCNC: 1 MG/DL (ref 0.4–1.2)
EKG ATRIAL RATE: 110 BPM
EKG P AXIS: 53 DEGREES
EKG P-R INTERVAL: 140 MS
EKG Q-T INTERVAL: 372 MS
EKG QRS DURATION: 130 MS
EKG QTC CALCULATION (BAZETT): 503 MS
EKG R AXIS: -8 DEGREES
EKG T AXIS: 75 DEGREES
EKG VENTRICULAR RATE: 110 BPM
EOSINOPHIL # BLD: 0.6 %
EOSINOPHIL # BLD: 0.7 %
EOSINOPHILS ABSOLUTE: 0 THOU/MM3 (ref 0–0.4)
EOSINOPHILS ABSOLUTE: 0 THOU/MM3 (ref 0–0.4)
ERYTHROCYTE [DISTWIDTH] IN BLOOD BY AUTOMATED COUNT: 12.7 % (ref 11.5–14.5)
ERYTHROCYTE [DISTWIDTH] IN BLOOD BY AUTOMATED COUNT: 12.8 % (ref 11.5–14.5)
ERYTHROCYTE [DISTWIDTH] IN BLOOD BY AUTOMATED COUNT: 45.7 FL (ref 35–45)
ERYTHROCYTE [DISTWIDTH] IN BLOOD BY AUTOMATED COUNT: 45.7 FL (ref 35–45)
GFR SERPL CREATININE-BSD FRML MDRD: 75 ML/MIN/1.73M2
GFR SERPL CREATININE-BSD FRML MDRD: 85 ML/MIN/1.73M2
GLUCOSE BLD-MCNC: 108 MG/DL (ref 70–108)
GLUCOSE BLD-MCNC: 94 MG/DL (ref 70–108)
HCT VFR BLD CALC: 37.1 % (ref 42–52)
HCT VFR BLD CALC: 38.9 % (ref 42–52)
HEMOGLOBIN: 11.8 GM/DL (ref 14–18)
HEMOGLOBIN: 12.2 GM/DL (ref 14–18)
IMMATURE GRANS (ABS): 0.01 THOU/MM3 (ref 0–0.07)
IMMATURE GRANS (ABS): 0.02 THOU/MM3 (ref 0–0.07)
IMMATURE GRANULOCYTES: 0.2 %
IMMATURE GRANULOCYTES: 0.4 %
LV EF: 58 %
LVEF MODALITY: NORMAL
LYMPHOCYTES # BLD: 17.3 %
LYMPHOCYTES # BLD: 21.2 %
LYMPHOCYTES ABSOLUTE: 0.9 THOU/MM3 (ref 1–4.8)
LYMPHOCYTES ABSOLUTE: 1 THOU/MM3 (ref 1–4.8)
MCH RBC QN AUTO: 30.7 PG (ref 26–33)
MCH RBC QN AUTO: 31.1 PG (ref 26–33)
MCHC RBC AUTO-ENTMCNC: 31.4 GM/DL (ref 32.2–35.5)
MCHC RBC AUTO-ENTMCNC: 31.8 GM/DL (ref 32.2–35.5)
MCV RBC AUTO: 97.6 FL (ref 80–94)
MCV RBC AUTO: 98 FL (ref 80–94)
MONOCYTES # BLD: 5.2 %
MONOCYTES # BLD: 6.7 %
MONOCYTES ABSOLUTE: 0.3 THOU/MM3 (ref 0.4–1.3)
MONOCYTES ABSOLUTE: 0.3 THOU/MM3 (ref 0.4–1.3)
NUCLEATED RED BLOOD CELLS: 0 /100 WBC
NUCLEATED RED BLOOD CELLS: 0 /100 WBC
OSMOLALITY CALCULATION: 274.3 MOSMOL/KG (ref 275–300)
PLATELET # BLD: 151 THOU/MM3 (ref 130–400)
PLATELET # BLD: 162 THOU/MM3 (ref 130–400)
PMV BLD AUTO: 10.2 FL (ref 9.4–12.4)
PMV BLD AUTO: 10.5 FL (ref 9.4–12.4)
POTASSIUM SERPL-SCNC: 3.4 MEQ/L (ref 3.5–5.2)
POTASSIUM SERPL-SCNC: 3.9 MEQ/L (ref 3.5–5.2)
RBC # BLD: 3.8 MILL/MM3 (ref 4.7–6.1)
RBC # BLD: 3.97 MILL/MM3 (ref 4.7–6.1)
SEG NEUTROPHILS: 70.3 %
SEG NEUTROPHILS: 76.3 %
SEGMENTED NEUTROPHILS ABSOLUTE COUNT: 3.2 THOU/MM3 (ref 1.8–7.7)
SEGMENTED NEUTROPHILS ABSOLUTE COUNT: 4 THOU/MM3 (ref 1.8–7.7)
SODIUM BLD-SCNC: 136 MEQ/L (ref 135–145)
SODIUM BLD-SCNC: 140 MEQ/L (ref 135–145)
TOTAL PROTEIN: 5.4 G/DL (ref 6.1–8)
TROPONIN T: < 0.01 NG/ML
WBC # BLD: 4.5 THOU/MM3 (ref 4.8–10.8)
WBC # BLD: 5.2 THOU/MM3 (ref 4.8–10.8)

## 2022-04-07 PROCEDURE — A9579 GAD-BASE MR CONTRAST NOS,1ML: HCPCS | Performed by: REGISTERED NURSE

## 2022-04-07 PROCEDURE — 6360000002 HC RX W HCPCS: Performed by: REGISTERED NURSE

## 2022-04-07 PROCEDURE — 93010 ELECTROCARDIOGRAM REPORT: CPT | Performed by: INTERNAL MEDICINE

## 2022-04-07 PROCEDURE — 80053 COMPREHEN METABOLIC PANEL: CPT

## 2022-04-07 PROCEDURE — 71045 X-RAY EXAM CHEST 1 VIEW: CPT

## 2022-04-07 PROCEDURE — 93306 TTE W/DOPPLER COMPLETE: CPT

## 2022-04-07 PROCEDURE — 99223 1ST HOSP IP/OBS HIGH 75: CPT | Performed by: INTERNAL MEDICINE

## 2022-04-07 PROCEDURE — 6370000000 HC RX 637 (ALT 250 FOR IP): Performed by: REGISTERED NURSE

## 2022-04-07 PROCEDURE — 6370000000 HC RX 637 (ALT 250 FOR IP): Performed by: INTERNAL MEDICINE

## 2022-04-07 PROCEDURE — 36415 COLL VENOUS BLD VENIPUNCTURE: CPT

## 2022-04-07 PROCEDURE — G0378 HOSPITAL OBSERVATION PER HR: HCPCS

## 2022-04-07 PROCEDURE — 2580000003 HC RX 258: Performed by: REGISTERED NURSE

## 2022-04-07 PROCEDURE — 93005 ELECTROCARDIOGRAM TRACING: CPT | Performed by: INTERNAL MEDICINE

## 2022-04-07 PROCEDURE — 84484 ASSAY OF TROPONIN QUANT: CPT

## 2022-04-07 PROCEDURE — 70553 MRI BRAIN STEM W/O & W/DYE: CPT

## 2022-04-07 PROCEDURE — 6360000004 HC RX CONTRAST MEDICATION: Performed by: REGISTERED NURSE

## 2022-04-07 PROCEDURE — 70450 CT HEAD/BRAIN W/O DYE: CPT

## 2022-04-07 PROCEDURE — 85025 COMPLETE CBC W/AUTO DIFF WBC: CPT

## 2022-04-07 PROCEDURE — 96372 THER/PROPH/DIAG INJ SC/IM: CPT

## 2022-04-07 RX ORDER — POTASSIUM CHLORIDE 20 MEQ/1
40 TABLET, EXTENDED RELEASE ORAL PRN
Status: DISCONTINUED | OUTPATIENT
Start: 2022-04-07 | End: 2022-04-09 | Stop reason: HOSPADM

## 2022-04-07 RX ORDER — NITROGLYCERIN 0.4 MG/1
0.4 TABLET SUBLINGUAL EVERY 5 MIN PRN
Status: CANCELLED | OUTPATIENT
Start: 2022-04-07 | End: 2022-04-07

## 2022-04-07 RX ORDER — ATROPINE SULFATE 0.1 MG/ML
0.5 INJECTION INTRAVENOUS EVERY 5 MIN PRN
Status: CANCELLED | OUTPATIENT
Start: 2022-04-07 | End: 2022-04-07

## 2022-04-07 RX ORDER — NITROGLYCERIN 20 MG/100ML
5-200 INJECTION INTRAVENOUS CONTINUOUS
Status: DISCONTINUED | OUTPATIENT
Start: 2022-04-07 | End: 2022-04-09 | Stop reason: HOSPADM

## 2022-04-07 RX ORDER — SODIUM CHLORIDE 0.9 % (FLUSH) 0.9 %
5-40 SYRINGE (ML) INJECTION PRN
Status: CANCELLED | OUTPATIENT
Start: 2022-04-07 | End: 2022-04-07

## 2022-04-07 RX ORDER — SODIUM CHLORIDE 9 MG/ML
500 INJECTION, SOLUTION INTRAVENOUS CONTINUOUS PRN
Status: CANCELLED | OUTPATIENT
Start: 2022-04-07 | End: 2022-04-07

## 2022-04-07 RX ORDER — METOPROLOL TARTRATE 5 MG/5ML
5 INJECTION INTRAVENOUS EVERY 5 MIN PRN
Status: CANCELLED | OUTPATIENT
Start: 2022-04-07 | End: 2022-04-07

## 2022-04-07 RX ORDER — AMINOPHYLLINE DIHYDRATE 25 MG/ML
50 INJECTION, SOLUTION INTRAVENOUS PRN
Status: CANCELLED | OUTPATIENT
Start: 2022-04-07 | End: 2022-04-07

## 2022-04-07 RX ORDER — NITROGLYCERIN 20 MG/100ML
5-200 INJECTION INTRAVENOUS CONTINUOUS
Status: DISCONTINUED | OUTPATIENT
Start: 2022-04-07 | End: 2022-04-07

## 2022-04-07 RX ORDER — HYDROXYZINE HYDROCHLORIDE 25 MG/1
25 TABLET, FILM COATED ORAL 4 TIMES DAILY PRN
Status: DISCONTINUED | OUTPATIENT
Start: 2022-04-07 | End: 2022-04-08

## 2022-04-07 RX ORDER — HYDROXYZINE HYDROCHLORIDE 25 MG/1
25 TABLET, FILM COATED ORAL 4 TIMES DAILY PRN
Status: DISCONTINUED | OUTPATIENT
Start: 2022-04-07 | End: 2022-04-07

## 2022-04-07 RX ORDER — ALBUTEROL SULFATE 90 UG/1
2 AEROSOL, METERED RESPIRATORY (INHALATION) PRN
Status: CANCELLED | OUTPATIENT
Start: 2022-04-07 | End: 2022-04-07

## 2022-04-07 RX ORDER — POTASSIUM CHLORIDE 7.45 MG/ML
10 INJECTION INTRAVENOUS PRN
Status: DISCONTINUED | OUTPATIENT
Start: 2022-04-07 | End: 2022-04-09 | Stop reason: HOSPADM

## 2022-04-07 RX ADMIN — FINASTERIDE 5 MG: 5 TABLET, FILM COATED ORAL at 16:10

## 2022-04-07 RX ADMIN — METOPROLOL TARTRATE 12.5 MG: 25 TABLET, FILM COATED ORAL at 23:12

## 2022-04-07 RX ADMIN — POTASSIUM CHLORIDE 40 MEQ: 20 TABLET, EXTENDED RELEASE ORAL at 07:11

## 2022-04-07 RX ADMIN — TROSPIUM CHLORIDE 20 MG: 20 TABLET, FILM COATED ORAL at 05:41

## 2022-04-07 RX ADMIN — DOXEPIN HYDROCHLORIDE 25 MG: 25 CAPSULE ORAL at 20:37

## 2022-04-07 RX ADMIN — TOPIRAMATE 100 MG: 100 TABLET, FILM COATED ORAL at 23:12

## 2022-04-07 RX ADMIN — CITALOPRAM 40 MG: 40 TABLET, FILM COATED ORAL at 16:10

## 2022-04-07 RX ADMIN — SUCRALFATE 1 G: 1 TABLET ORAL at 20:37

## 2022-04-07 RX ADMIN — GADOTERIDOL 15 ML: 279.3 INJECTION, SOLUTION INTRAVENOUS at 18:46

## 2022-04-07 RX ADMIN — Medication 1 CAPSULE: at 11:42

## 2022-04-07 RX ADMIN — NITROGLYCERIN 0.4 MG: 0.4 TABLET, ORALLY DISINTEGRATING SUBLINGUAL at 07:52

## 2022-04-07 RX ADMIN — NITROGLYCERIN 0.4 MG: 0.4 TABLET, ORALLY DISINTEGRATING SUBLINGUAL at 07:58

## 2022-04-07 RX ADMIN — TAMSULOSIN HYDROCHLORIDE 0.4 MG: 0.4 CAPSULE ORAL at 11:43

## 2022-04-07 RX ADMIN — PRAVASTATIN SODIUM 40 MG: 40 TABLET ORAL at 20:37

## 2022-04-07 RX ADMIN — DOCUSATE SODIUM 100 MG: 100 CAPSULE, LIQUID FILLED ORAL at 11:43

## 2022-04-07 RX ADMIN — SUCRALFATE 1 G: 1 TABLET ORAL at 16:10

## 2022-04-07 RX ADMIN — LEVOTHYROXINE SODIUM 88 MCG: 0.09 TABLET ORAL at 05:41

## 2022-04-07 RX ADMIN — ASPIRIN 81 MG: 81 TABLET, COATED ORAL at 11:43

## 2022-04-07 RX ADMIN — PANTOPRAZOLE SODIUM 40 MG: 40 TABLET, DELAYED RELEASE ORAL at 20:37

## 2022-04-07 RX ADMIN — TAMSULOSIN HYDROCHLORIDE 0.4 MG: 0.4 CAPSULE ORAL at 23:12

## 2022-04-07 RX ADMIN — SUCRALFATE 1 G: 1 TABLET ORAL at 11:43

## 2022-04-07 RX ADMIN — HYDROXYZINE HYDROCHLORIDE 25 MG: 25 TABLET, FILM COATED ORAL at 16:10

## 2022-04-07 RX ADMIN — CLOPIDOGREL BISULFATE 75 MG: 75 TABLET ORAL at 11:42

## 2022-04-07 RX ADMIN — SODIUM CHLORIDE: 9 INJECTION, SOLUTION INTRAVENOUS at 09:36

## 2022-04-07 RX ADMIN — Medication 1 CAPSULE: at 16:10

## 2022-04-07 RX ADMIN — POLYETHYLENE GLYCOL 3350 17 G: 17 POWDER, FOR SOLUTION ORAL at 20:37

## 2022-04-07 RX ADMIN — TROSPIUM CHLORIDE 20 MG: 20 TABLET, FILM COATED ORAL at 16:10

## 2022-04-07 RX ADMIN — SUCRALFATE 1 G: 1 TABLET ORAL at 05:41

## 2022-04-07 RX ADMIN — DOCUSATE SODIUM 100 MG: 100 CAPSULE, LIQUID FILLED ORAL at 20:37

## 2022-04-07 RX ADMIN — METOPROLOL TARTRATE 12.5 MG: 25 TABLET, FILM COATED ORAL at 11:43

## 2022-04-07 RX ADMIN — ENOXAPARIN SODIUM 40 MG: 40 INJECTION SUBCUTANEOUS at 11:42

## 2022-04-07 RX ADMIN — POLYETHYLENE GLYCOL 3350 17 G: 17 POWDER, FOR SOLUTION ORAL at 11:43

## 2022-04-07 RX ADMIN — PANTOPRAZOLE SODIUM 40 MG: 40 TABLET, DELAYED RELEASE ORAL at 11:43

## 2022-04-07 RX ADMIN — TOPIRAMATE 100 MG: 100 TABLET, FILM COATED ORAL at 11:43

## 2022-04-07 RX ADMIN — Medication 1000 UNITS: at 16:10

## 2022-04-07 RX ADMIN — HYDROXYZINE HYDROCHLORIDE 25 MG: 25 TABLET, FILM COATED ORAL at 23:12

## 2022-04-07 RX ADMIN — Medication 1 CAPSULE: at 20:37

## 2022-04-07 ASSESSMENT — PAIN DESCRIPTION - FREQUENCY
FREQUENCY: CONTINUOUS
FREQUENCY: CONTINUOUS

## 2022-04-07 ASSESSMENT — PAIN SCALES - GENERAL
PAINLEVEL_OUTOF10: 0
PAINLEVEL_OUTOF10: 5
PAINLEVEL_OUTOF10: 0
PAINLEVEL_OUTOF10: 4

## 2022-04-07 ASSESSMENT — PAIN DESCRIPTION - LOCATION
LOCATION: ABDOMEN
LOCATION: HEAD

## 2022-04-07 ASSESSMENT — PAIN DESCRIPTION - ORIENTATION
ORIENTATION: ANTERIOR
ORIENTATION: MID

## 2022-04-07 ASSESSMENT — PAIN DESCRIPTION - PROGRESSION
CLINICAL_PROGRESSION: NOT CHANGED
CLINICAL_PROGRESSION: GRADUALLY IMPROVING

## 2022-04-07 ASSESSMENT — PAIN DESCRIPTION - DESCRIPTORS
DESCRIPTORS: ACHING
DESCRIPTORS: ACHING

## 2022-04-07 ASSESSMENT — PAIN - FUNCTIONAL ASSESSMENT
PAIN_FUNCTIONAL_ASSESSMENT: ACTIVITIES ARE NOT PREVENTED
PAIN_FUNCTIONAL_ASSESSMENT: ACTIVITIES ARE NOT PREVENTED

## 2022-04-07 ASSESSMENT — PAIN DESCRIPTION - ONSET
ONSET: UNABLE TO TELL
ONSET: ON-GOING

## 2022-04-07 ASSESSMENT — PAIN DESCRIPTION - PAIN TYPE
TYPE: ACUTE PAIN
TYPE: ACUTE PAIN

## 2022-04-07 NOTE — ED NOTES
ED to inpatient nurses report    Chief Complaint   Patient presents with    Back Pain    Chest Pain      Present to ED from nursing home  LOC: alert and orientated to name, place, date  Vital signs   Vitals:    04/06/22 1930 04/06/22 1943 04/1960 04/06/22 2013   BP:  110/67 (!) 140/69 133/61   Pulse: 78 86 77    Resp: 18      Temp:       SpO2: 99% 100% 97% 97%   Weight:          Oxygen Baseline none    Current needs required none   LDAs:   Peripheral IV 04/06/22 Right Antecubital (Active)   Site Assessment Clean;Dry; Intact 04/06/22 1404   Line Status Blood return noted;Specimen collected; Flushed 04/06/22 1404   Dressing Status Clean; Intact;Dry 04/06/22 1404     Mobility: orthos postivie at this time, using urinal.  Pending ED orders: none  Present condition: stable    Electronically signed by Sathya Zepeda RN on 4/6/2022 at 8:21 PM       Preet Reina RN  04/06/22 2022

## 2022-04-07 NOTE — H&P
Internal Medicine Specialties  H&P  4/7/2022  11:24 AM    Patient:  Janneth Luong  YOB: 1960    MRN: [id number]   Acct:  [de-identified]   4A-06/006-A  Primary Care Physician: Serafin Morgan MD    Chief Complaint:  Chief Complaint   Patient presents with    Back Pain    Chest Pain       History of Present Illness: The patient is a 57 y.o. male with pmhx of CAD, hypothyroidism, BPH, HLD,  Anxiety who presents with chest pain and lower back pain that has been intermittent for the past few days. Pt has been lifting a lot of heavy things lately which he thinks has caused his back pain. He follows with Dr Katina Vazquez for CAD--Last cath was supposedly a year ago and no stents placed. In the emergency department pt had an unresponsive episode on the way to the bathroom. His orthostatic vitals were positive despite adequate fluid resuscitation. His trops and EKG were non acute. I did order a CTA chest which was negative for aneurysm/dissection or PE. This morning pt was a rapid response on the floor for crushing chest pain relieved by nitro, severe headache, BP in the 210s. He was transferred to step down. His trop is still negative, EKG unchanged. Cardiology has been consulted. Now he is complaining of mild chest pain, a severe headache again and blurry vision that began a few minutes ago. He denies SOB. He has some abdominal pain on palpation that he attributes to not eating breakfast yet. His EKG has QTc prolongation.        Past Medical History:        Diagnosis Date    Angina at rest [facility])     intermittent    Anxiety     Anxiety     Arthritis     Asperger's disorder     Asthma     Cervical radiculopathy     CHF (congestive heart failure) (HCC)     Depression     Depression     Gall stones     GERD (gastroesophageal reflux disease)     Hyperlipidemia     Hypertension     Seizures (HCC)     Tremor        Past Surgical History:        Procedure Laterality Date    ABDOMEN SURGERY      CARDIAC SURGERY      Heart Cath    CHOLECYSTECTOMY  2002    CHOLECYSTECTOMY, LAPAROSCOPIC      COLONOSCOPY      COLONOSCOPY      DILATATION, ESOPHAGUS      ENDOSCOPY, COLON, DIAGNOSTIC      ESOPHAGEAL DILATATION      KNEE ARTHROSCOPY Left 1984    NECK SURGERY  1996    plate in neck and fusion    SIGMOIDOSCOPY Left 6/30/2020    SIGMOIDOSCOPY DIAGNOSTIC FLEXIBLE performed by Ginette Prescott MD at [facility]    TURP  2/11/2016    UPPER GASTROINTESTINAL ENDOSCOPY         Home Medications: Medications Prior to Admission: promethazine (PHENERGAN) 25 MG tablet, Take 25 mg by mouth every 6 hours as needed for Nausea  LORazepam (ATIVAN) 1 MG tablet, Take 1 mg by mouth 2 times daily as needed for Anxiety. [DISCONTINUED] amLODIPine (NORVASC) 5 MG tablet, Take 1 tablet by mouth daily  metoprolol tartrate (LOPRESSOR) 25 MG tablet, Take 12.5 mg by mouth 2 times daily   guaiFENesin (MUCUS RELIEF) 600 MG extended release tablet, Take 600 mg by mouth 2 times daily   finasteride (PROSCAR) 5 MG tablet, Take 5 mg by mouth daily  doxepin (SINEQUAN) 25 MG capsule, Take 25 mg by mouth nightly  solifenacin (VESICARE) 10 MG tablet, Take 10 mg by mouth daily   docusate sodium (COLACE) 100 MG capsule, Take 100 mg by mouth 2 times daily  hydrOXYzine (ATARAX) 25 MG tablet, Take 25 mg by mouth 4 times daily as needed for Itching  loperamide (IMODIUM) 2 MG capsule, Take 2 mg by mouth as needed for Diarrhea  Albuterol Sulfate (PROAIR HFA IN), Inhale 90 mcg into the lungs every 4 hours as needed  traMADol (ULTRAM) 50 MG tablet, Take 50 mg by mouth every 8 hours as needed for Pain.   calcium carbonate (TUMS) 500 MG chewable tablet, Take 1 tablet by mouth as needed for Heartburn  acetaminophen (TYLENOL) 500 MG tablet, Take 500 mg by mouth every 4 hours as needed for Pain  [DISCONTINUED] promethazine (PHENERGAN) 25 MG tablet, Take 25 mg by mouth every 6 hours as needed for Nausea  HYDROcodone-acetaminophen (NORCO) 5-325 MG per tablet, Take 1 tablet by mouth every 6 hours as needed for Pain . aspirin 81 MG tablet, Take 81 mg by mouth daily  clopidogrel (PLAVIX) 75 MG tablet, Take 75 mg by mouth daily  SYNTHROID 88 MCG tablet, Take one tablet 7 days/week with extra 1/2 tablet one day/week (Patient taking differently: Take one tablet 7 days/week with extra 1/2 tablet one day/week on Wednesday)  ipratropium-albuterol (DUONEB) 0.5-2.5 (3) MG/3ML SOLN nebulizer solution, Inhale 3 mLs into the lungs every 4 hours as needed for Shortness of Breath  lactobacillus acidophilus [facility]), Take 1 tablet by mouth 3 times daily  QUEtiapine (SEROQUEL) 25 MG tablet, Take 500 mg by mouth nightly Patient takes 1 200 mg tablet + 1 300 mg tablet at bedtime  ARTIFICIAL TEAR OP, Apply to eye 1 drop in each eye 2-4 times per day  tamsulosin (FLOMAX) 0.4 MG capsule, Take 1 capsule by mouth 2 times daily 2 tablets at bedtime  cyclobenzaprine (FLEXERIL) 10 MG tablet, Take 10 mg by mouth 2 times daily as needed for Muscle spasms  zolpidem (AMBIEN CR) 12.5 MG CR tablet, Take 10 mg by mouth nightly as needed for Sleep. topiramate (TOPAMAX) 100 MG tablet, Take 100 mg by mouth 2 times daily  ondansetron (ZOFRAN) 4 MG tablet,  Take 8 mg by mouth every 12 hours as needed   pravastatin (PRAVACHOL) 40 MG tablet,  Take 40 mg by mouth nightly   vitamin D (CHOLECALCIFEROL) 1000 UNIT TABS tablet, Take 1 tablet by mouth daily. citalopram (CELEXA) 40 MG tablet, Take 1 tablet by mouth daily. nitroGLYCERIN (NITROSTAT) 0.4 MG SL tablet, Place 1 tablet under the tongue every 5 minutes as needed for Chest pain. [DISCONTINUED] isosorbide mononitrate (IMDUR) 30 MG CR tablet, Take 0.5 tablets by mouth daily. polyethylene glycol (GLYCOLAX) packet, Take 17 g by mouth 2 times daily.   pantoprazole (PROTONIX) 40 MG tablet,  Take 40 mg by mouth 2 times daily   sucralfate (CARAFATE) 1 GM/10ML suspension,  Take 1 g by mouth 4 times daily (before meals and nightly)   Montelukast Sodium (SINGULAIR PO),  Take 10 mg by mouth daily   benzonatate (TESSALON) 100 MG capsule, Take 100 mg by mouth daily. loratadine (CLARITIN) 10 MG tablet, Take 10 mg by mouth daily. Allergies:  Morphine, Caffeine, Percodan [oxycodone-aspirin], Tape [adhesive tape], Zyban [bupropion hcl], Dicyclomine hcl, and Oxycodone    Social History:    reports that he has never smoked. He has never used smokeless tobacco. He reports that he does not drink alcohol and does not use drugs.       Family History:       Problem Relation Age of Onset    Cancer Mother     Diabetes Mother     Heart Disease Mother     High Blood Pressure Mother     High Cholesterol Mother     Arthritis Father     Heart Disease Father     Cancer Father     Diabetes Father     Substance Abuse Father     Depression Father     Obesity Brother     Asthma Brother     Mental Retardation Brother     High Blood Pressure Sister     High Cholesterol Sister        Review of Systems:    General ROS: negative  Psychological ROS: negative  Hematological and Lymphatic ROS: negative  Endocrine ROS: negative  Vision:negative  ENT ROS: Denies  Respiratory ROS: no cough, shortness of breath, or wheezing  Cardiovascular ROS: chest pain severe, mild now  Gastrointestinal ROS: generalized abdominal pain, no N/V/Diarrhea  Genito-Urinary ROS: no dysuria, trouble voiding, or hematuria  Musculoskeletal ROS: negative  Neurological ROS: 10/10 headache and blurry vision  Dermatological ROS: negative  Weight:no change in weight  Appetite:ok      Physical Exam:    Vitals:  Patient Vitals for the past 24 hrs:   BP Temp Temp src Pulse Resp SpO2 Height Weight   04/07/22 0937 (!) 159/79 -- -- 70 -- -- -- --   04/07/22 0852 -- -- -- -- -- 96 % -- --   04/07/22 0848 118/81 99.1 °F (37.3 °C) Oral 72 16 98 % -- --   04/07/22 0817 126/81 -- -- 85 -- -- -- --   04/07/22 0815 133/80 98.3 °F (36.8 °C) -- 81 -- -- -- --   04/07/22 0808 129/89 -- -- -- -- -- -- --   04/07/22 0805 138/88 -- -- -- -- -- -- --   04/07/22 0800 (!) 154/101 -- -- 99 -- 96 % -- --   04/07/22 0745 (!) 207/121 -- -- 84 -- -- -- --   04/07/22 0740 (!) 200/90 -- -- 100 -- -- -- --   04/07/22 0300 127/69 97.5 °F (36.4 °C) Oral 61 18 95 % -- --   04/06/22 2337 123/71 98.3 °F (36.8 °C) Oral 61 20 96 % -- --   04/06/22 2100 132/69 98.5 °F (36.9 °C) Oral 70 18 99 % 6' (1.829 m) 178 lb 14.4 oz (81.1 kg)   04/06/22 2013 133/61 -- -- -- -- 97 % -- --   04/1960 (!) 140/69 -- -- 77 -- 97 % -- --   04/06/22 1943 110/67 -- -- 86 -- 100 % -- --   04/06/22 1930 -- -- -- 78 18 99 % -- --   04/06/22 1817 117/87 -- -- 76 -- 99 % -- --   04/06/22 1808 -- -- -- 83 -- 99 % -- --   04/06/22 1632 102/78 -- -- 86 18 97 % -- --   04/06/22 1601 -- -- -- -- 20 98 % -- --   04/06/22 1528 126/84 -- -- 95 18 -- -- --   04/06/22 1359 -- -- -- -- -- -- -- 160 lb (72.6 kg)   04/06/22 1352 109/73 -- -- -- -- -- -- --   04/06/22 1350 -- 98 °F (36.7 °C) -- 108 22 94 % -- --     Weight: Weight: 178 lb 14.4 oz (81.1 kg)     24 hour intake/output:    Intake/Output Summary (Last 24 hours) at 4/7/2022 1124  Last data filed at 4/7/2022 0859  Gross per 24 hour   Intake 1153.58 ml   Output 400 ml   Net 753.58 ml       General appearance - alert, ill appearing, and in no distress  Eyes - pupils equal and reactive, extraocular eye movements intact  Ears - bilateral TM's and external ear canals normal  Nose - normal and patent, no erythema, discharge or polyps  Mouth - mucous membranes moist, pharynx normal without lesions  Neck - supple, no significant adenopathy  Lymphatics - no palpable lymphadenopathy, no hepatosplenomegaly  Chest -diminished BL  Distant Breath Sounds: No  Heart - normal rate, regular rhythm, normal S1, S2, no murmurs, rubs, clicks or gallops  Abdomen - soft, tender throughout, nondistended, no masses or organomegaly  Obese: No; Protuberant: No  Neurological - alert, oriented, normal speech, no focal findings or movement disorder noted  Musculoskeletal - no joint tenderness, deformity or swelling  Extremities - peripheral pulses normal, no pedal edema, no clubbing or cyanosis  Skin - normal coloration and turgor, no rashes, no suspicious skin lesions noted    Review of Labs and Diagnostic Testing:    CBC:   Recent Labs     04/07/22  0835   WBC 4.5*   HGB 12.2*   HCT 38.9*   MCV 98.0*        BMP:   Recent Labs     04/07/22  0853      K 3.9   *   CO2 17*   BUN 17   CREATININE 1.0   CALCIUM 7.6*   GLUCOSE 108     PT/INR: No results for input(s): PROTIME, INR in the last 72 hours. APTT: No results for input(s): APTT in the last 72 hours. Lipids:   Recent Labs     04/07/22  0853   ALKPHOS 58   ALT 15   AST 22   BILITOT 0.2*   LABALBU 3.3*     Troponin:   Recent Labs     04/07/22  0853   TROPONINT < 0.010        Imaging:  CT HEAD WO CONTRAST    Result Date: 4/7/2022  PROCEDURE: CT HEAD WO CONTRAST CLINICAL INFORMATION: headache/chest pain. Sudden onset headache today. COMPARISON: Head CT 11/25/2021. TECHNIQUE: Noncontrast 5 mm axial images were obtained through the brain. Sagittal and coronal reconstructions were obtained. All CT scans at this facility use dose modulation, iterative reconstruction, and/or weight-based dosing when appropriate to reduce radiation dose to as low as reasonably achievable. FINDINGS: There is no hemorrhage. There are no intra-or extra-axial collections. There is no hydrocephalus, midline shift or mass effect. The gray-white matter differentiation is preserved. The paranasal sinuses and mastoid air cells are normally aerated. There is no suspicious calvarial abnormality. There is no significant change in the appearance of the brain compared to the prior study. No evidence of an acute process. No change from prior. **This report has been created using voice recognition software. It may contain minor errors which are inherent in voice recognition technology. ** Final report electronically signed by Dr. Navi Cooper on 4/7/2022 8:46 AM    CTA CHEST W WO CONTRAST    Result Date: 4/6/2022  Exam: CTA chest with IV contrast. Procedure: MIP reconstructions were performed Comparison: 11/21/2015 Clinical history: Rule out aneurysm orthostatic hypotension Findings: Evaluation limited by patient motion artifact. No thoracic aortic aneurysm or dissection. Evaluation for PE limited by suboptimal opacification. No central pulmonary emboli. Dilated air-filled thoracic esophagus is nonspecific. No hilar or mediastinal adenopathy. No pericardial fluid collection. No pleural fluid collections. No pneumothorax. No pneumonia. Minimal bibasilar atelectasis. Visualized liver and spleen do not demonstrate any acute process Prior cholecystectomy Normal adrenal glands Visualized large bowel is distended with stool No thoracic compression fracture. Impression: 1. No thoracic aortic aneurysm or dissection. 2. Mild bibasilar atelectasis This document has been electronically signed by: Alley Alvarez MD on 04/06/2022 09:43 PM All CTs at this facility use dose modulation techniques and iterative reconstructions, and/or weight-based dosing when appropriate to reduce radiation to a low as reasonably achievable. XR FINGER RIGHT (MIN 2 VIEWS)    Result Date: 3/17/2022  PROCEDURE: XR FINGER RIGHT (MIN 2 VIEWS) CLINICAL INFORMATION: Right thumb r/o fx . COMPARISON: Plain radiographs dated 17th July 2013. Jorge Luis Colunga TECHNIQUE: An AP view of the right hand and 2 views of the first digit were obtained. FINDINGS: There is a nondisplaced fracture in the first proximal phalanx. The remaining bony structures are intact. . The remaining soft tissues are normal.     1. 1. Nondisplaced fracture in the first proximal phalanx. Jorge Luis Colunga **This report has been created using voice recognition software. It may contain minor errors which are inherent in voice recognition technology. ** Final report electronically signed by DR Ezekiel Silveira on 3/17/2022 12:47 PM    XR CHEST PORTABLE    Result Date: 4/7/2022  PROCEDURE: XR CHEST PORTABLE CLINICAL INFORMATION: chest pain. COMPARISON: Chest x-ray dated 6 April 2022. TECHNIQUE: AP upright view of the chest. FINDINGS: The heart size is normal.The mediastinum is not widened. There is increased density at both lung bases, right greater than left, unchanged. There are no effusions. . The pulmonary vascularity is normal. There are postoperative changes in the lower cervical spine. No interval change since previous study dated 6 April 2022. Sukhdeep De La Torre **This report has been created using voice recognition software. It may contain minor errors which are inherent in voice recognition technology. ** Final report electronically signed by DR Qi Varghese on 4/7/2022 8:41 AM    XR CHEST PORTABLE    Result Date: 4/6/2022  PROCEDURE: XR CHEST PORTABLE CLINICAL INFORMATION: chest pain. COMPARISON: Chest x-ray dated 25 November 2021. TECHNIQUE: AP upright view of the chest. FINDINGS: The heart size is normal.The mediastinum is not widened. There are  areas of abnormal density at both lung bases suggestive of atelectasis or infiltrates. There are no effusions. The pulmonary vascularity is normal. There are postoperative changes in the lower cervical spine. 1. Areas of abnormal density at both lung bases suggestive of atelectasis or infiltrate stenosis previous study dated 25 November 2021. 2. Postoperative changes in the lower cervical spine. **This report has been created using voice recognition software. It may contain minor errors which are inherent in voice recognition technology. ** Final report electronically signed by DR Qi Varghese on 4/6/2022 2:54 PM        Assessment/Plan:    1. Orthostatic positive  a. Recheck now after receiving IVF through the night  b. Stop IVF and monitor   c. Up with assistance  2. Chest pain, resolved by nitro  a. Cardiology consulted   b. Check echocardiogram--pending read  c. Trops and EKG negative  d.  Could be anxiety induced--resume home Atarax   e. Cont ASA/Plavix/BB/Statin  3. Severe headache and blurry vision   a. Check MRI brain  4. Prolonged Qtc  a. Seroquel on hold   b. Monitor   5. HTN urgency, resolved  a. Resolved with SL nitro  b. Nitro drip was ordered for high BP but unable to start as BP decreased quickly to 120s  6. Hypothyroidism   a. Cont Synthroid   b. TSH slightly low end of normal  7. DVT prophylaxis   a. Lovenox      Assessment and plan of care discussed with supervising physician, Dr Alta Flower. Electronically signed by ELLIE Roman CNP on 4/7/2022 at 11:24 AM         Copy: Primary Care Physician: Mariusz Donahue MD    I have discussed the case, including pertinent history and exam findings with the NP. I have seen and examined the patient and the key elements of the encounter have been performed by me. I agree with the assessment, plan and orders as documented by the NP.     Electronically signed by Evelio Payan MD on 4/7/2022 at 12:16 PM

## 2022-04-07 NOTE — ED NOTES
Patient voided into urinal at this time. Patient repositioned in bed and covered with blankets. Denies other needs at this time - call light within reach.  Side rails up x2     Alva Singh  04/06/22 2007

## 2022-04-07 NOTE — PROGRESS NOTES
[address]ilma [address] 58  OCCUPATIONAL THERAPY MISSED TREATMENT NOTE  [facility] 4A  4A-06/006-A      Date: 2022  Patient Name: Nyasia Alvarez        CSN: [id number]   : 1960  (57 y.o.)  Gender: male                REASON FOR MISSED TREATMENT: Hold Treatment per Nursing; Per RN, pt with chest pain, rapid response called, and transferred off of floor-recommended hold OT at this time. Will check back as able.

## 2022-04-07 NOTE — CARE COORDINATION
4/7/22, 1:27 PM EDT  DISCHARGE PLANNING EVALUATION:    Maryse Contreras       Admitted: 4/6/2022/ [address] day: 0   Location: 4A-06/006-A Reason for admit: Orthostatic hypotension [I95.1]  Back strain, initial encounter [A83.309K]  Chest pain, unspecified type [R07.9]   PMH:  has a past medical history of Angina at rest [facility]), Anxiety, Anxiety, Arthritis, Asperger's disorder, Asthma, Cervical radiculopathy, CHF (congestive heart failure) ([facility] [address] Depression, Depression, Gall stones, GERD (gastroesophageal reflux disease), Hyperlipidemia, Hypertension, Seizures ([facility] [address] and Tremor. Procedure:   4/6 CXR  Impression:        1. Areas of abnormal density at both lung bases suggestive of atelectasis or infiltrate stenosis previous study dated 25 November 2021.   2. Postoperative changes in the lower cervical spine. 4/7 CT Head WO Contrast    Impression:        No evidence of an acute process. No change from prior. Barriers to Discharge:  From ED. C/O back and chest pain. PT/OT, telemetry, Cardiology consult, electrolyte replacement protocols. Asa, Plavix, Lovenox, Protonix, electrolyte replacement protocols\. PCP: Kasey Kearney MD   %    Patient Goals/Plan/Treatment Preferences: Met with Ariana Feliciano. He currently lives at [address] [place] [address]. He states that he drives. Plan is to return at discharge. He denies need for DME and declines HH. SW following. Will follow for potential needs. Transportation/Food Security/Housekeeping Addressed:  No issues identified.

## 2022-04-07 NOTE — CARE COORDINATION
4/7/22, 8:17 AM EDT    DISCHARGE PLANNING EVALUATION      From [facility]. Sister, Tee Strong, is guardian.

## 2022-04-07 NOTE — PROGRESS NOTES
Patient called out complaining of chest pain this morning, rapid response called and bp 207/121. Nitro x2. Transfer to  with nitro drip. New consult to cardiology, message sent thru perfect serve. CT head and CXR portable ordered. Labs ordered. Waiting on response from cardiology. Dr. Paramjit Ruiz to update Dr. Flaca Beckman.

## 2022-04-07 NOTE — PROGRESS NOTES
Pt admitted to  7K6 via cart. Complaints: hypotension. IV site free of s/s of infection or infiltration. Vital signs obtained. Assessment and data collection initiated. Two nurse skin assessment performed by [facility] and [facility]. Oriented to room. Explained patients right to have family, representative or physician notified of their admission. Patient has Declined for physician to be notified. Patient has Declined for family/representative to be notified. The patient is interested in [facility]. [facility] meds to beds program?:  No    Policies and procedures for 7 explained. All questions answered with no further questions at this time. Fall prevention and safety brochure discussed with patient. Bed alarm on. Call light in reach.

## 2022-04-07 NOTE — CONSULTS
[facility] [address] [place] [address]  Cardiology Consult        Patient:  Rosa Finnegan  YOB: 1960  MRN: [id number]     Acct: [de-identified]    PCP: Denita Irizarry MD    Date of Admission: 4/6/2022      Reason for Consultation:  Chest pain       History Of Present Illness:    57 y.o. pleasant male who presented to the hospital with complaints of chest pain. Pt states he has had worsening chest pain at rest since yesterday, was originally a 4/10 and did improve with nitroglycerin. He had noticed worsening shortness of breath and chest pain with exertion over the last month and a half, though this was the first episode at rest. This morning the chest pain returned and spiked to a 7/10 while eating breakfast, chest pain resolved following nitroglycerin gtt. Blood pressure did increase as high as 207/121 during episode of chest pain this AM. Follows with Dr. James Nguyen for cardiology. Describes the pain as a pressure on anterior chest wall, this morning was radiating into the jaw and the left shoulder. Denies any nausea, palpitations. Does endorse feeling dizzy and lightheaded with the pain. EKG 4/7/22: Sinus Tachycardia   Echo 7/31/13: EF 55%, no regional wall motion abnormalities, grade 1 diastolic dysfunction noted, mild to moderate mitral regurgitation, mild tricuspid regurgitation   Troponin Trended <0.010--><0.010--><0.010    All labs, EKG's, diagnostic testing and images as well as cardiac cath, stress testing were reviewed during this encounter.     Past Medical History:          Diagnosis Date    Angina at rest [facility])     intermittent    Anxiety     Anxiety     Arthritis     Asperger's disorder     Asthma     Cervical radiculopathy     CHF (congestive heart failure) (HCC)     Depression     Depression     Gall stones     GERD (gastroesophageal reflux disease)     Hyperlipidemia     Hypertension     Seizures (HCC)     Tremor        Past Surgical History:          Procedure Laterality Date    ABDOMEN SURGERY      CARDIAC SURGERY      Heart Cath    CHOLECYSTECTOMY  2002    CHOLECYSTECTOMY, LAPAROSCOPIC      COLONOSCOPY      COLONOSCOPY      DILATATION, ESOPHAGUS      ENDOSCOPY, COLON, DIAGNOSTIC      ESOPHAGEAL DILATATION      KNEE ARTHROSCOPY Left 1984    NECK SURGERY  1996    plate in neck and fusion    SIGMOIDOSCOPY Left 6/30/2020    SIGMOIDOSCOPY DIAGNOSTIC FLEXIBLE performed by Doreen Arevalo MD at [facility]    TURP  2/11/2016    UPPER GASTROINTESTINAL ENDOSCOPY         Medications Prior to Admission:      Prior to Admission medications    Medication Sig Start Date End Date Taking? Authorizing Provider   promethazine (PHENERGAN) 25 MG tablet Take 25 mg by mouth every 6 hours as needed for Nausea   Yes Historical Provider, MD   LORazepam (ATIVAN) 1 MG tablet Take 1 mg by mouth 2 times daily as needed for Anxiety. Yes Historical Provider, MD   metoprolol tartrate (LOPRESSOR) 25 MG tablet Take 12.5 mg by mouth 2 times daily     Historical Provider, MD   guaiFENesin (MUCUS RELIEF) 600 MG extended release tablet Take 600 mg by mouth 2 times daily     Historical Provider, MD   finasteride (PROSCAR) 5 MG tablet Take 5 mg by mouth daily    Historical Provider, MD   doxepin (SINEQUAN) 25 MG capsule Take 25 mg by mouth nightly    Historical Provider, MD   solifenacin (VESICARE) 10 MG tablet Take 10 mg by mouth daily     Historical Provider, MD   docusate sodium (COLACE) 100 MG capsule Take 100 mg by mouth 2 times daily    Historical Provider, MD   hydrOXYzine (ATARAX) 25 MG tablet Take 25 mg by mouth 4 times daily as needed for Itching    Historical Provider, MD   loperamide (IMODIUM) 2 MG capsule Take 2 mg by mouth as needed for Diarrhea    Historical Provider, MD   Albuterol Sulfate (PROAIR HFA IN) Inhale 90 mcg into the lungs every 4 hours as needed    Historical Provider, MD   traMADol (ULTRAM) 50 MG tablet Take 50 mg by mouth every 8 hours as needed for Pain. Historical Provider, MD   calcium carbonate (TUMS) 500 MG chewable tablet Take 1 tablet by mouth as needed for Heartburn    Historical Provider, MD   acetaminophen (TYLENOL) 500 MG tablet Take 500 mg by mouth every 4 hours as needed for Pain    Historical Provider, MD   HYDROcodone-acetaminophen (NORCO) 5-325 MG per tablet Take 1 tablet by mouth every 6 hours as needed for Pain . Historical Provider, MD   aspirin 81 MG tablet Take 81 mg by mouth daily    Historical Provider, MD   clopidogrel (PLAVIX) 75 MG tablet Take 75 mg by mouth daily    Historical Provider, MD   SYNTHROID 88 MCG tablet Take one tablet 7 days/week with extra 1/2 tablet one day/week  Patient taking differently: Take one tablet 7 days/week with extra 1/2 tablet one day/week on Wednesday 1/19/16   Анна Maxwell MD   ipratropium-albuterol (DUONEB) 0.5-2.5 (3) MG/3ML SOLN nebulizer solution Inhale 3 mLs into the lungs every 4 hours as needed for Shortness of Breath 11/29/15   Kandice Quiñones MD   lactobacillus acidophilus [facility]) Take 1 tablet by mouth 3 times daily    Historical Provider, MD   QUEtiapine (SEROQUEL) 25 MG tablet Take 500 mg by mouth nightly Patient takes 1 200 mg tablet + 1 300 mg tablet at bedtime    Historical Provider, MD   ARTIFICIAL TEAR OP Apply to eye 1 drop in each eye 2-4 times per day    Historical Provider, MD   tamsulosin (FLOMAX) 0.4 MG capsule Take 1 capsule by mouth 2 times daily 2 tablets at bedtime 8/4/15   Yovani Lam, APRN - CNP   cyclobenzaprine (FLEXERIL) 10 MG tablet Take 10 mg by mouth 2 times daily as needed for Muscle spasms    Historical Provider, MD   zolpidem (AMBIEN CR) 12.5 MG CR tablet Take 10 mg by mouth nightly as needed for Sleep.      Historical Provider, MD   topiramate (TOPAMAX) 100 MG tablet Take 100 mg by mouth 2 times daily    Historical Provider, MD   ondansetron (ZOFRAN) 4 MG tablet   Take 8 mg by mouth every 12 hours as needed     Historical Provider, MD   pravastatin (PRAVACHOL) 40 MG tablet   Take 40 mg by mouth nightly     Historical Provider, MD   vitamin D (CHOLECALCIFEROL) 1000 UNIT TABS tablet Take 1 tablet by mouth daily. 1/7/13   Jordin Schwartz MD   citalopram (CELEXA) 40 MG tablet Take 1 tablet by mouth daily. 1/7/13   Jordin Schwartz MD   nitroGLYCERIN (NITROSTAT) 0.4 MG SL tablet Place 1 tablet under the tongue every 5 minutes as needed for Chest pain. 1/7/13   Jordin Schwartz MD   polyethylene glycol [facility]) packet Take 17 g by mouth 2 times daily. 12/27/12   ELLIE Bonilla CNP   pantoprazole (PROTONIX) 40 MG tablet   Take 40 mg by mouth 2 times daily  11/26/12   Historical Provider, MD   sucralfate (CARAFATE) 1 GM/10ML suspension   Take 1 g by mouth 4 times daily (before meals and nightly)     Historical Provider, MD   Montelukast Sodium (SINGULAIR PO)   Take 10 mg by mouth daily     Historical Provider, MD   benzonatate (TESSALON) 100 MG capsule Take 100 mg by mouth daily. Historical Provider, MD   loratadine (CLARITIN) 10 MG tablet Take 10 mg by mouth daily.       Historical Provider, MD       Current Facility-Administered Medications   Medication Dose Route Frequency Provider Last Rate Last Admin    potassium chloride (KLOR-CON M) extended release tablet 40 mEq  40 mEq Oral PRN ELLIE Crain CNP   40 mEq at 04/07/22 0711    Or    potassium bicarb-citric acid (EFFER-K) effervescent tablet 40 mEq  40 mEq Oral PRN ELLIE Crain CNP        Or    potassium chloride 10 mEq/100 mL IVPB (Peripheral Line)  10 mEq IntraVENous PRN ELLIE Crain CNP        nitroGLYCERIN 50 mg in dextrose 5% 250 mL infusion  5-200 mcg/min IntraVENous Continuous Brenda Nguyễn MD        acetaminophen (TYLENOL) tablet 500 mg  500 mg Oral Q4H PRN ELLIE Crain CNP        albuterol sulfate  (90 Base) MCG/ACT inhaler 2 puff  2 puff Inhalation Q4H PRN ELLIE Crain CNP        [Held by provider] amLODIPine (NORVASC) tablet 5 mg  5 mg Oral Daily Olayinka Precise, APRN - CNP        aspirin EC tablet 81 mg  81 mg Oral Daily Olayinka Precise, APRN - CNP        calcium carbonate (TUMS) chewable tablet 500 mg  1 tablet Oral PRN Olayinka Precise, APRN - CNP        citalopram (CELEXA) tablet 40 mg  40 mg Oral Daily Olayinka Precise, APRN - CNP        clopidogrel (PLAVIX) tablet 75 mg  75 mg Oral Daily Olayinka Precise, APRN - CNP        cyclobenzaprine (FLEXERIL) tablet 10 mg  10 mg Oral BID PRN Olayinka Precise, APRN - CNP   10 mg at 04/06/22 2242    docusate sodium (COLACE) capsule 100 mg  100 mg Oral BID Olayinka Precise, APRN - CNP   100 mg at 04/06/22 2236    doxepin (SINEQUAN) capsule 25 mg  25 mg Oral Nightly Olayinka Precise, APRN - CNP   25 mg at 04/06/22 2236    finasteride (PROSCAR) tablet 5 mg  5 mg Oral Daily Olayinka Precise, APRN - CNP        [Held by provider] isosorbide mononitrate (IMDUR) extended release tablet 15 mg  15 mg Oral Daily Olayinka Precise, APRN - CNP        lactobacillus (CULTURELLE) capsule 1 capsule  1 capsule Oral TID Olayinka Precise, APRN - CNP   1 capsule at 04/06/22 2236    nitroGLYCERIN (NITROSTAT) SL tablet 0.4 mg  0.4 mg SubLINGual Q5 Min PRN Olayinka Precise, APRN - CNP   0.4 mg at 04/07/22 0758    pantoprazole (PROTONIX) tablet 40 mg  40 mg Oral BID Olayinka Precise, APRN - CNP   40 mg at 04/06/22 2236    polyethylene glycol (GLYCOLAX) packet 17 g  17 g Oral BID Olayinka Precise, APRN - CNP   17 g at 04/06/22 2236    pravastatin (PRAVACHOL) tablet 40 mg  40 mg Oral Nightly Olayinka Precise, APRN - CNP   40 mg at 04/06/22 2236    promethazine (PHENERGAN) tablet 25 mg  25 mg Oral Q6H PRN Olayinka Precise, APRN - CNP        [Held by provider] QUEtiapine (SEROQUEL) tablet 500 mg  500 mg Oral Nightly Olayinka Precise, APRN - CNP        sucralfate (CARAFATE) tablet 1 g  1 g Oral 4x Daily AC & HS Olayinka Precise, APRN - CNP   1 g at 04/07/22 0541    levothyroxine (SYNTHROID) tablet 88 mcg  88 mcg Oral Daily Jesus Stallion, APRN - CNP   88 mcg at 04/07/22 0541    tamsulosin (FLOMAX) capsule 0.4 mg  0.4 mg Oral BID Jesus Stallion, APRN - CNP   0.4 mg at 04/06/22 2236    topiramate (TOPAMAX) tablet 100 mg  100 mg Oral BID Jesus Stallion, APRN - CNP   100 mg at 04/06/22 2236    traMADol (ULTRAM) tablet 50 mg  50 mg Oral Q8H PRN Jesus Stallion, APRN - CNP   50 mg at 04/06/22 2242    vitamin D (CHOLECALCIFEROL) tablet 1,000 Units  1,000 Units Oral Daily Jesus Stallion, APRN - CNP        0.9 % sodium chloride infusion   IntraVENous Continuous Jesus Stallion, APRN -  mL/hr at 04/07/22 0936 New Bag at 04/07/22 0936    trospium (SANCTURA) tablet 20 mg  20 mg Oral BID AC Jesus Stallion, APRN - CNP   20 mg at 04/07/22 0541    zolpidem (AMBIEN) tablet 5 mg  5 mg Oral Nightly PRN Jesus Stallion, APRN - CNP        metoprolol tartrate (LOPRESSOR) tablet 12.5 mg  12.5 mg Oral BID MD Navarro Dasilva Alen Chriss ON 4/13/2022] levothyroxine (SYNTHROID) tablet 44 mcg  44 mcg Oral Weekly Kaitlin Noel MD           Allergies:  Morphine, Caffeine, Percodan [oxycodone-aspirin], Tape Clayton Bo tape], Zyban [bupropion hcl], Dicyclomine hcl, and Oxycodone    Social History:    TOBACCO:   reports that he has never smoked. He has never used smokeless tobacco.  ETOH:   reports no history of alcohol use.       Family History:        Problem Relation Age of Onset    Cancer Mother     Diabetes Mother     Heart Disease Mother     High Blood Pressure Mother     High Cholesterol Mother     Arthritis Father     Heart Disease Father     Cancer Father     Diabetes Father     Substance Abuse Father     Depression Father     Obesity Brother     Asthma Brother     Mental Retardation Brother     High Blood Pressure Sister     High Cholesterol Sister          Review of Systems -   General ROS: negative  Psychological ROS: negative  Hematological and Lymphatic ROS: History of PE per pt, negative for bleeding disorder. Respiratory ROS: no cough, or wheezing. Endorse shortness of breath   Cardiovascular ROS: As per HPI  Gastrointestinal ROS: negative  Genito-Urinary ROS: no dysuria, trouble voiding, or hematuria  Musculoskeletal ROS: negative  Neurological ROS: no TIA or stroke symptoms  Dermatological ROS: negative    All others reviewed and are negative.        BP (!) 159/79   Pulse 70   Temp 99.1 °F (37.3 °C) (Oral)   Resp 16   Ht 6' (1.829 m)   Wt 178 lb 14.4 oz (81.1 kg)   SpO2 96%   BMI 24.26 kg/m²       Physical Examination:   General appearance - alert, in no distress  Mental status - alert, oriented to person, place, and time  Neck - supple, no significant adenopathy, no JVD, or carotid bruits  Chest - clear to auscultation, no wheezes, rales or rhonchi, symmetric air entry  Heart - normal rate, regular rhythm, normal S1, S2, no murmurs, rubs, clicks or gallops  Abdomen - soft, nontender, nondistended, no masses or organomegaly  Neurological - alert, oriented, normal speech, no focal findings or movement disorder noted  Musculoskeletal - no joint tenderness, deformity or swelling  Extremities - peripheral pulses normal, no pedal edema, no clubbing or cyanosis  Skin - normal coloration and turgor, no rashes, no suspicious skin lesions noted      LABS:    Recent Labs     04/07/22  0258 04/07/22  0807 04/07/22  0853   TROPONINT < 0.010 < 0.010 < 0.010     CBC:   Lab Results   Component Value Date    WBC 4.5 04/07/2022    RBC 3.97 04/07/2022    RBC 4.35 11/16/2011    HGB 12.2 04/07/2022    HCT 38.9 04/07/2022    MCV 98.0 04/07/2022    MCH 30.7 04/07/2022    MCHC 31.4 04/07/2022    RDW 12.4 12/01/2021     04/07/2022    MPV 10.5 04/07/2022     BMP:    Lab Results   Component Value Date     04/07/2022    K 3.9 04/07/2022    K 4.5 04/06/2022     04/07/2022    CO2 17 04/07/2022    BUN 17 04/07/2022    LABALBU 3.3 04/07/2022    LABALBU 4.1 11/16/2011    CREATININE 1.0 04/07/2022    CALCIUM 7.6 04/07/2022    LABGLOM 75 04/07/2022    GLUCOSE 108 04/07/2022    GLUCOSE 130 04/27/2021     Hepatic Function Panel:    Lab Results   Component Value Date    ALKPHOS 58 04/07/2022    ALT 15 04/07/2022    AST 22 04/07/2022    PROT 5.4 04/07/2022    BILITOT 0.2 04/07/2022    BILIDIR 0.1 04/27/2021    LABALBU 3.3 04/07/2022    LABALBU 4.1 11/16/2011     Magnesium:    Lab Results   Component Value Date    MG 2.2 11/25/2021     Warfarin PT/INR:  No components found for: PTPATWAR, PTINRWAR  HgBA1c:  No results found for: LABA1C  FLP:    Lab Results   Component Value Date    TRIG 81 04/27/2021    HDL 48 04/27/2021    LDLCALC 67 02/07/2020    LDLDIRECT 79 04/27/2021     TSH:    Lab Results   Component Value Date    TSH 0.626 04/06/2022     BNP: No components found for: PRO-BNP      Assessment/Plan:    Patient Active Problem List   Diagnosis    Pituitary adenoma ([facility] [address]    Hypogonadism male    History of seizure disorder    Cognitive impairment    Hyperlipidemia with target LDL less than 100    CAD (coronary artery disease)    Psychiatric disorder    Central hypothyroidism    Aggressive behavior    HTN (hypertension)    GERD (gastroesophageal reflux disease)    Seizure disorder (HCC)    Dizziness    Chest pain at rest    Chest pain at rest    CKD (chronic kidney disease) stage 3, GFR 30-59 ml/min (HCC)    Hypotension    Benign non-nodular prostatic hyperplasia with lower urinary tract symptoms     A:  Chest pain with concerns for angina   HTN   HLD    P:  Telemetry  Serial enzymes negative, ruling out ACS  Schedule for Stress test   NPO at midnight   Risks/benefits/alternatives discussed with patient   Continue to monitor hemodynamics   Further recs based on stress test results and clinical course    Please do note hesitate to contact me for any further questions. Thank you for the opportunity to be involved in this patient's care.     Code Status: Full Code    Electronically signed by Valentin Hancock DO on 4/7/2022 at 10:12 AM

## 2022-04-07 NOTE — ED PROVIDER NOTES
[address]    Pt Name: Ronan Zuleta  MRN: [id number]  [place] 1960  Date of evaluation: 9/12/20      I personally saw and examined the patient. I have reviewed and agree with the Resident findings, including all diagnostic interpretations and treatment plans as written. I was present for the key portion of any procedures performed and the inclusive time noted in any critical care statement. History:     Patient was seen with Gayla Smith, resident physician. This 52-year-old male had multiple issues including some back pain as well as some chest pain. This was after moving some furniture. He was also notably orthostatic and when he tries to get up he was kind of near syncopal.  When they were wheeling him back to the room at 1 point I saw him slumped over in the chair. He was still awake but he said he felt lightheaded. He received multiple fluid boluses.   His back pain was improved with lidocaine patch medication however he was still orthostatic and we ended up admitting him    Diagnosis: Persistent orthostatic hypotension  Admitted                Mariangel Lubin DO  04/07/22 1333

## 2022-04-07 NOTE — PROGRESS NOTES
[address] 58  PHYSICAL THERAPY MISSED TREATMENT NOTE  [facility]    Date: 2022  Patient Name: Kush Larios        MRN: [id number]   : 1960  (57 y.o.)  Gender: male                REASON FOR MISSED TREATMENT:  Missed Treat. Per RN, pt with chest pain and transferred off of floor-recommended hold PT at this time.   Will check back as able

## 2022-04-08 PROCEDURE — A9500 TC99M SESTAMIBI: HCPCS | Performed by: INTERNAL MEDICINE

## 2022-04-08 PROCEDURE — 97162 PT EVAL MOD COMPLEX 30 MIN: CPT

## 2022-04-08 PROCEDURE — 78452 HT MUSCLE IMAGE SPECT MULT: CPT

## 2022-04-08 PROCEDURE — 6360000002 HC RX W HCPCS

## 2022-04-08 PROCEDURE — 97116 GAIT TRAINING THERAPY: CPT

## 2022-04-08 PROCEDURE — 93016 CV STRESS TEST SUPVJ ONLY: CPT | Performed by: INTERNAL MEDICINE

## 2022-04-08 PROCEDURE — G0378 HOSPITAL OBSERVATION PER HR: HCPCS

## 2022-04-08 PROCEDURE — 93018 CV STRESS TEST I&R ONLY: CPT | Performed by: INTERNAL MEDICINE

## 2022-04-08 PROCEDURE — 99232 SBSQ HOSP IP/OBS MODERATE 35: CPT | Performed by: NURSE PRACTITIONER

## 2022-04-08 PROCEDURE — 97530 THERAPEUTIC ACTIVITIES: CPT

## 2022-04-08 PROCEDURE — 93017 CV STRESS TEST TRACING ONLY: CPT | Performed by: INTERNAL MEDICINE

## 2022-04-08 PROCEDURE — 6370000000 HC RX 637 (ALT 250 FOR IP): Performed by: REGISTERED NURSE

## 2022-04-08 PROCEDURE — 6360000002 HC RX W HCPCS: Performed by: REGISTERED NURSE

## 2022-04-08 PROCEDURE — 97166 OT EVAL MOD COMPLEX 45 MIN: CPT

## 2022-04-08 PROCEDURE — 3430000000 HC RX DIAGNOSTIC RADIOPHARMACEUTICAL: Performed by: INTERNAL MEDICINE

## 2022-04-08 PROCEDURE — 96372 THER/PROPH/DIAG INJ SC/IM: CPT

## 2022-04-08 PROCEDURE — 1200000000 HC SEMI PRIVATE

## 2022-04-08 PROCEDURE — 78452 HT MUSCLE IMAGE SPECT MULT: CPT | Performed by: INTERNAL MEDICINE

## 2022-04-08 RX ORDER — HYDROXYZINE HYDROCHLORIDE 25 MG/1
50 TABLET, FILM COATED ORAL 4 TIMES DAILY PRN
Status: DISCONTINUED | OUTPATIENT
Start: 2022-04-08 | End: 2022-04-09 | Stop reason: HOSPADM

## 2022-04-08 RX ADMIN — TAMSULOSIN HYDROCHLORIDE 0.4 MG: 0.4 CAPSULE ORAL at 20:24

## 2022-04-08 RX ADMIN — TROSPIUM CHLORIDE 20 MG: 20 TABLET, FILM COATED ORAL at 16:03

## 2022-04-08 RX ADMIN — SUCRALFATE 1 G: 1 TABLET ORAL at 20:24

## 2022-04-08 RX ADMIN — PANTOPRAZOLE SODIUM 40 MG: 40 TABLET, DELAYED RELEASE ORAL at 07:55

## 2022-04-08 RX ADMIN — DOCUSATE SODIUM 100 MG: 100 CAPSULE, LIQUID FILLED ORAL at 20:24

## 2022-04-08 RX ADMIN — SUCRALFATE 1 G: 1 TABLET ORAL at 10:58

## 2022-04-08 RX ADMIN — Medication 1 CAPSULE: at 07:55

## 2022-04-08 RX ADMIN — DOXEPIN HYDROCHLORIDE 25 MG: 25 CAPSULE ORAL at 20:24

## 2022-04-08 RX ADMIN — Medication 1000 UNITS: at 07:59

## 2022-04-08 RX ADMIN — PANTOPRAZOLE SODIUM 40 MG: 40 TABLET, DELAYED RELEASE ORAL at 20:24

## 2022-04-08 RX ADMIN — POLYETHYLENE GLYCOL 3350 17 G: 17 POWDER, FOR SOLUTION ORAL at 20:24

## 2022-04-08 RX ADMIN — ENOXAPARIN SODIUM 40 MG: 40 INJECTION SUBCUTANEOUS at 07:56

## 2022-04-08 RX ADMIN — FINASTERIDE 5 MG: 5 TABLET, FILM COATED ORAL at 07:58

## 2022-04-08 RX ADMIN — CLOPIDOGREL BISULFATE 75 MG: 75 TABLET ORAL at 07:55

## 2022-04-08 RX ADMIN — TOPIRAMATE 100 MG: 100 TABLET, FILM COATED ORAL at 20:24

## 2022-04-08 RX ADMIN — POLYETHYLENE GLYCOL 3350 17 G: 17 POWDER, FOR SOLUTION ORAL at 10:58

## 2022-04-08 RX ADMIN — Medication 30.6 MILLICURIE: at 09:20

## 2022-04-08 RX ADMIN — ASPIRIN 81 MG: 81 TABLET, COATED ORAL at 07:55

## 2022-04-08 RX ADMIN — CYCLOBENZAPRINE 10 MG: 10 TABLET, FILM COATED ORAL at 03:58

## 2022-04-08 RX ADMIN — LEVOTHYROXINE SODIUM 88 MCG: 0.09 TABLET ORAL at 06:36

## 2022-04-08 RX ADMIN — SUCRALFATE 1 G: 1 TABLET ORAL at 16:03

## 2022-04-08 RX ADMIN — PRAVASTATIN SODIUM 40 MG: 40 TABLET ORAL at 20:24

## 2022-04-08 RX ADMIN — TOPIRAMATE 100 MG: 100 TABLET, FILM COATED ORAL at 07:58

## 2022-04-08 RX ADMIN — Medication 9.5 MILLICURIE: at 08:33

## 2022-04-08 RX ADMIN — CITALOPRAM 40 MG: 40 TABLET, FILM COATED ORAL at 07:58

## 2022-04-08 RX ADMIN — Medication 1 CAPSULE: at 20:24

## 2022-04-08 RX ADMIN — TAMSULOSIN HYDROCHLORIDE 0.4 MG: 0.4 CAPSULE ORAL at 07:59

## 2022-04-08 RX ADMIN — TROSPIUM CHLORIDE 20 MG: 20 TABLET, FILM COATED ORAL at 06:36

## 2022-04-08 RX ADMIN — DOCUSATE SODIUM 100 MG: 100 CAPSULE, LIQUID FILLED ORAL at 07:55

## 2022-04-08 RX ADMIN — SUCRALFATE 1 G: 1 TABLET ORAL at 06:36

## 2022-04-08 ASSESSMENT — PAIN SCALES - GENERAL
PAINLEVEL_OUTOF10: 0

## 2022-04-08 NOTE — PROGRESS NOTES
Progress note      [facility]             Patient:  Boris Solano  YOB: 1960    MRN: [id number]   Acct:  [de-identified]   [id number]/[id number]  Primary Care Physician: Nohelia Colunga MD    Admit Date: 4/6/2022           Subjective: Pt feeling better today. No CP or headache or blurry vision. Had stress test this AM. From [place] and plans are to return there at d/c.      Objective:      Physical Exam:    Vitals:Patient Vitals for the past 24 hrs:   BP Temp Temp src Pulse Resp SpO2 Weight   04/08/22 1058 134/70 -- -- 57 -- -- --   04/08/22 0745 (!) 147/65 98.3 °F (36.8 °C) Oral 56 14 97 % --   04/08/22 0324 121/73 98.6 °F (37 °C) Oral 56 14 95 % 179 lb (81.2 kg)   04/07/22 2309 130/82 98.3 °F (36.8 °C) Oral 62 16 96 % --   04/07/22 2023 136/85 97.7 °F (36.5 °C) Oral 70 18 97 % --   04/07/22 1521 (!) 164/86 98.2 °F (36.8 °C) Oral 67 18 98 % --     Weight: Weight: 179 lb (81.2 kg)     24 hour intake/output:    Intake/Output Summary (Last 24 hours) at 4/8/2022 1222  Last data filed at 4/8/2022 0636  Gross per 24 hour   Intake 420 ml   Output 1275 ml   Net -855 ml       General appearance - alert, well appearing, and in no distress  Eyes - pupils equal and reactive, extraocular eye movements intact  Mouth - mucous membranes moist, pharynx normal without lesions  Neck - supple, no significant adenopathy  Chest - clear to auscultation, no wheezes, rales or rhonchi, symmetric air entry  Heart - normal rate, regular rhythm, normal S1, S2, no murmurs, rubs, clicks or gallops  Abdomen - soft, nontender, nondistended, no masses or organomegaly, pos bs.   Neurological - alert, oriented, normal speech, no focal findings or movement disorder noted  Musculoskeletal - no joint tenderness, deformity or swelling  Extremities - peripheral pulses normal, no pedal edema, no clubbing or cyanosis  Skin - normal coloration and turgor, no rashes, no suspicious skin lesions noted    Review of Labs and Diagnostic Testing:    CBC:   Recent Labs     04/07/22  0835   WBC 4.5*   HGB 12.2*   HCT 38.9*   MCV 98.0*        BMP:   Recent Labs     04/07/22  0853      K 3.9   *   CO2 17*   BUN 17   CREATININE 1.0   CALCIUM 7.6*   GLUCOSE 108     PT/INR: No results for input(s): PROTIME, INR in the last 72 hours. APTT: No results for input(s): APTT in the last 72 hours. Lipids:   Recent Labs     04/07/22  0853   ALKPHOS 58   ALT 15   AST 22   BILITOT 0.2*   LABALBU 3.3*     Troponin:   Recent Labs     04/07/22  0853   TROPONINT < 0.010        Imaging:  @[address]    G:      Diet: ADULT DIET;  Regular        Data:   Scheduled Meds: Scheduled Meds:   enoxaparin  40 mg SubCUTAneous Daily    [Held by provider] amLODIPine  5 mg Oral Daily    aspirin  81 mg Oral Daily    citalopram  40 mg Oral Daily    clopidogrel  75 mg Oral Daily    docusate sodium  100 mg Oral BID    doxepin  25 mg Oral Nightly    finasteride  5 mg Oral Daily    [Held by provider] isosorbide mononitrate  15 mg Oral Daily    lactobacillus  1 capsule Oral TID    pantoprazole  40 mg Oral BID    polyethylene glycol  17 g Oral BID    pravastatin  40 mg Oral Nightly    [Held by provider] QUEtiapine  500 mg Oral Nightly    sucralfate  1 g Oral 4x Daily AC & HS    levothyroxine  88 mcg Oral Daily    tamsulosin  0.4 mg Oral BID    topiramate  100 mg Oral BID    Vitamin D  1,000 Units Oral Daily    trospium  20 mg Oral BID AC    metoprolol tartrate  12.5 mg Oral BID    [START ON 4/13/2022] levothyroxine  44 mcg Oral Weekly     Continuous Infusions:   nitroGLYCERIN       PRN Meds:.potassium chloride **OR** potassium alternative oral replacement **OR** potassium chloride, hydrOXYzine, regadenoson, acetaminophen, albuterol sulfate HFA, calcium carbonate, cyclobenzaprine, nitroGLYCERIN, promethazine, traMADol, zolpidem  Continuous Infusions:   nitroGLYCERIN           Assessment/Plan 1. Orthostatic positive  a. Positive yesterday again  b. Recheck today   2. Chest pain, resolved by nitro  a. Cardiology following  b. Stress test this AM; await results    c. Echo ok  d. Trops and EKG negative  e. Could be anxiety induced--resumed home Atarax   f. Cont ASA/Plavix/BB/Statin  3. Severe headache and blurry vision   a. MRI brain negative  b. Sx resolved   4. Prolonged Qtc  a. Seroquel on hold   b. Monitor   5. HTN urgency, resolved  a. Resolved   6. Hypothyroidism             a. Cont Synthroid   b. TSH slightly low end of normal  7. DVT prophylaxis   a. Lovenox          Assessment and plan of care discussed with supervising physician, Dr Mickie Mireles.     Electronically signed by ELLIE Bee CNP on 4/8/2022 at 12:22 PM

## 2022-04-08 NOTE — PLAN OF CARE
Problem: Pain:  Goal: Pain level will decrease  Description: Pain level will decrease  Outcome: Ongoing  Note: Patient denies pain during this shift. Will continue to monitor. Problem: Pain:  Goal: Control of acute pain  Description: Control of acute pain  Outcome: Ongoing  Note: Patient denies pain during this shift. Will continue to monitor. Problem: Pain:  Goal: Control of chronic pain  Description: Control of chronic pain  Outcome: Ongoing  Note: Patient denies pain during this shift. Will continue to monitor. Problem: Falls - Risk of:  Goal: Will remain free from falls  Description: Will remain free from falls  Outcome: Ongoing  Note: Patient educated on and encouraged to use the call light for assistance from staff with needs. Patient verbalizes an understanding of this. Bed wheels locked and bed in lowest position; bed alarm on. Patient possessions within reach; pathways clear at this time. Problem: Falls - Risk of:  Goal: Absence of physical injury  Description: Absence of physical injury  Outcome: Ongoing  Note: Patient educated on and encouraged to use the call light for assistance from staff with needs. Patient verbalizes an understanding of this. Bed wheels locked and bed in lowest position; bed alarm on. Patient possessions within reach; pathways clear at this time. Problem: Skin Integrity:  Goal: Will show no infection signs and symptoms  Description: Will show no infection signs and symptoms  Outcome: Ongoing  Note: No signs and/or symptoms of infection noted during this shift. Patient afebrile during this shift. No new skin breakdown noted during this shift. Patient able to turn self in bed; staff assistance provided as needed. Foot of bed elevated. Problem: Skin Integrity:  Goal: Absence of new skin breakdown  Description: Absence of new skin breakdown  Outcome: Ongoing  Note: No signs and/or symptoms of infection noted during this shift. Patient afebrile during this shift. No new skin breakdown noted during this shift. Patient able to turn self in bed; staff assistance provided as needed. Foot of bed elevated. Problem: Discharge Planning:  Goal: Discharged to appropriate level of care  Description: Discharged to appropriate level of care  Outcome: Ongoing  Note: Patient and family actively involved in [facility]. Discharge planning in progress at this time. Will continue to monitor. Care plan reviewed with patient. Patient verbalizes understanding of the plan of care and contributes to goal setting.

## 2022-04-08 NOTE — CARE COORDINATION
DISCHARGE/PLANNING EVALUATION  4/8/22, 8:11 AM EDT    Reason for Referral: From [facility]. Mental Status: Alert and oriented to person, place and time. Decision Making: Called his sister Lissette Yu as she is listed as guardian. She told SW that she has had some medical issues so the guardianship has been transferred to her sister Dede Pederson and left a message for Bethany.   Family/Social/Home Environment: Lives at [facility] and he wants to return. Current Services including food security, transportation and housekeeping: From [facility]. Family is supportive. Current Equipment:He reports that he does not use an assistive device. Payment Source:Medicaid  Concerns or Barriers to Discharge: None  Post acute provider list with quality measures, geographic area and applicable managed care information provided. Questions regarding selection process answered:NA    Teach Back Method used with Elvira Chicas regarding care plan and discharge planning. Patient verbalized understanding of the plan of care and contribute to goal setting. Patient goals, treatment preferences and discharge plan: Patient plans to return to [facility]. Spoke with Kristin at the facility and they are ready to accept back any time. Called and left a message for sister Johana to verify they can accept at discharge. Electronically signed by TU White on 4/8/2022 at 8:11 AM     4/8/22, 4:09 PM EDT    Patient goals/plan/ treatment preferences discussed by  and . Patient goals/plan/ treatment preferences reviewed with patient/ family. Patient/ family verbalize understanding of discharge plan and are in agreement with goal/plan/treatment preferences. Understanding was demonstrated using the teach back method.   AVS provided by RN at time of discharge, which includes all necessary medical information pertaining to the patients current course of illness, treatment, post-discharge goals of care, and treatment preferences. Spoke with Stephanie Fitzgerald.  She is agreeable to [place] Gaines returning to [facility] at discharge. She is unsure if family will be able to transport, it depends on when discharge is planned. He is a potential discharge for today or the weekend. The facility is aware of potential discharge.

## 2022-04-08 NOTE — PROGRESS NOTES
Patient alert and oriented; vital signs stable. Discharge instructions reviewed with patient. Patient verbalizes understanding. Telemetry and IV removed. All belongings with patient, wheelchair transportation provided via [employer]. Patient instructed to call and make a GI appointment for 2 weeks form now.  Also instructed to call to make an appointment with cardiology at the next available appointment

## 2022-04-08 NOTE — PROGRESS NOTES
Vilma Barriga 58  INPATIENT OCCUPATIONAL THERAPY  [facility] NEUROSCIENCES 4A  EVALUATION    Time:   Time In: 3263  Time Out: 1353  Timed Code Treatment Minutes: 13 Minutes  Minutes: 21          Date: 2022  Patient Name: Jolene Solo,   Gender: male      MRN: [id number]  : 1960  (57 y.o.)  Referring Practitioner: ELLIE Garg CNP  Diagnosis: Orthostatic hypotension  Additional Pertinent Hx: The patient is a 57 y.o. male with pmhx of CAD, hypothyroidism, BPH, HLD,  Anxiety who presents with chest pain and lower back pain that has been intermittent for the past few days. Pt has been lifting a lot of heavy things lately which he thinks has caused his back pain. He follows with Dr Tonie Mendez for CAD--Last cath was supposedly a year ago and no stents placed. In the emergency department pt had an unresponsive episode on the way to the bathroom. His orthostatic vitals were positive despite adequate fluid resuscitation. His trops and EKG were non acute. I did order a CTA chest which was negative for aneurysm/dissection or PE. This morning pt was a rapid response on the floor for crushing chest pain relieved by nitro, severe headache, BP in the 210s. He was transferred to step down. His trop is still negative, EKG unchanged. Cardiology has been consulted. Now he is complaining of mild chest pain, a severe headache again and blurry vision that began a few minutes ago. He denies SOB. He has some abdominal pain on palpation that he attributes to not eating breakfast yet. His EKG has QTc prolongation. Restrictions/Precautions:  Restrictions/Precautions: General Precautions,Fall Risk    Subjective  Chart Reviewed: Terryann Alstrom and Physical  Patient assessed for rehabilitation services?: Yes    Subjective: RN okayed OT session. Upon arrival patient was sitting up in bed. Pt was agreeable to OT session.     Pain:  Pain Assessment  Patient Currently in Pain: Denies    Vitals: Vitals not assessed per clinical judgement, see nursing flowsheet  Orthostatic Blood Pressure: Supine: 126/66, Sittin/63, Standin/67    Social/Functional History:  Lives With: Alone  Type of Home: Assisted living  Home Layout: One level  Home Access: Level entry  Home Equipment:  (noAD)   Bathroom Shower/Tub: Walk-in shower  Bathroom Toilet: Handicap height  Bathroom Equipment: Grab bars in shower,Toilet raiser  Bathroom Accessibility: Accessible       ADL Assistance: Independent  Homemaking Assistance: Needs assistance (Staff assists with cleaning, laundry, and provides all meals.)  Homemaking Responsibilities: No  Ambulation Assistance: Independent  Transfer Assistance: Independent    Active : Yes  Occupation: Retired       VISION:WFL    HEARING:  WFL    COGNITION: Slow Processing, Decreased Problem Solving and Decreased Safety Awareness    RANGE OF MOTION:  Bilateral Upper Extremity:  WFL    STRENGTH:  Bilateral Upper Extremity:  Impaired - deconditioned     ADL:   Lower Extremity Dressing: Minimal Assistance. Dennis Wilson BALANCE:  Sitting Balance:  Stand By Assistance. Standing Balance: Contact Guard Assistance. BED MOBILITY:  Supine to Sit: Stand By Assistance    Scooting: Stand By Assistance      TRANSFERS:  Sit to Stand:  [employer]. Stand to Sit: Contact Guard Assistance. FUNCTIONAL MOBILITY:  Assistive Device: None  Assist Level:  Contact Guard Assistance. Distance: EOB to recliner  Slow pace, No LOB. Activity Tolerance:  Patient tolerance of  treatment: good. Assessment:  Assessment: This 57year old male presents with orthostatic hypotension. Pt requires skilled OT intervention to increase indep and endurance with all self cares, transfers, mobility to return to PLOF.   Performance deficits / Impairments: Decreased functional mobility ,Decreased ADL status,Decreased strength,Decreased endurance,Decreased balance,Decreased high-level IADLs,Decreased safe awareness  Prognosis: Good  REQUIRES OT FOLLOW UP: Yes    Treatment Initiated: Treatment and education initiated within context of evaluation. Evaluation time included review of current medical information, gathering information related to past medical, social and functional history, completion of standardized testing, formal and informal observation of tasks, assessment of data and development of plan of care and goals. Treatment time included skilled education and facilitation of tasks to increase safety and independence with ADL's for improved functional independence and quality of life. Discharge Recommendations:  Continue to assess pending progress    Patient Education:  OT Education: OT Role,Plan of Care,ADL Adaptive Strategies,Transfer Training,Precautions    Equipment Recommendations:  Equipment Needed: No    Plan:  Times per week: 3-5x  Current Treatment Recommendations: Strengthening,Functional Mobility Training,Balance Training,Endurance Training,Safety Education & Training,Self-Care / ADL,Patient/Caregiver Education & Training,Home Management Training,Equipment Evaluation, Education, & procurement. See long-term goal time frame for expected duration of plan of care. If no long-term goals established, a short length of stay is anticipated. Goals:  Patient goals : Go to [place]. Short term goals  Time Frame for Short term goals: Until discharge  Short term goal 1: Pt will complete BUE light resistive exercises with min vcs for technique to increase indep and endurance with all self cares and transfers. Short term goal 2: Pt will complete standing task x 4 minutes with 2 UE release and S and 0 vcs for safety to increase indep with sinkside grooming. Short term goal 3: Pt will complete functional mobility to/from BR and HH distances with SBA and min vcs for safety to increase indep and endurance with toileting.   Short term goal 4: Pt will complete LB dressing with LHAE with min A to increase indep within home environment. Following session, patient left in safe position with all fall risk precautions in place.

## 2022-04-08 NOTE — DISCHARGE SUMMARY
Discharge Summary  4/8/2022  4:22 PM    Patient:  Kush Larios  YOB: 1960    MRN: [id number]   Acct: [de-identified]   [id number]   Primary Care Physician: Nelly Mueller MD    Admit date:  4/6/2022    Discharge date:  4/8/2022    Discharge Diagnoses:    Orthostasis  Chest pain, neg stress test  Headache, resolved, negative MRI  HTN urgency, resolved  Hypothryoidism         Discharge Medications:         Medication List      CHANGE how you take these medications    Synthroid 88 MCG tablet  Generic drug: levothyroxine  Take one tablet 7 days/week with extra 1/2 tablet one day/week  What changed: additional instructions        CONTINUE taking these medications    acetaminophen 500 MG tablet  Commonly known as: TYLENOL     Ambien CR 12.5 MG extended release tablet  Generic drug: zolpidem     ARTIFICIAL TEAR OP     aspirin 81 MG tablet     benzonatate 100 MG capsule  Commonly known as: TESSALON     calcium carbonate 500 MG chewable tablet  Commonly known as: TUMS     citalopram 40 MG tablet  Commonly known as: CELEXA  Take 1 tablet by mouth daily.      clopidogrel 75 MG tablet  Commonly known as: PLAVIX     cyclobenzaprine 10 MG tablet  Commonly known as: FLEXERIL     docusate sodium 100 MG capsule  Commonly known as: COLACE     doxepin 25 MG capsule  Commonly known as: SINEQUAN     finasteride 5 MG tablet  Commonly known as: PROSCAR     HYDROcodone-acetaminophen 5-325 MG per tablet  Commonly known as: NORCO     hydrOXYzine 25 MG tablet  Commonly known as: ATARAX     ipratropium-albuterol 0.5-2.5 (3) MG/3ML Soln nebulizer solution  Commonly known as: DUONEB  Inhale 3 mLs into the lungs every 4 hours as needed for Shortness of Breath     lactobacillus acidophilus     loperamide 2 MG capsule  Commonly known as: IMODIUM     loratadine 10 MG tablet  Commonly known as: CLARITIN     LORazepam 1 MG tablet  Commonly known as: ATIVAN     metoprolol tartrate 25 MG tablet  Commonly known as: LOPRESSOR  Take 0.5 g Oral 4x Daily AC & HS    levothyroxine  88 mcg Oral Daily    tamsulosin  0.4 mg Oral BID    topiramate  100 mg Oral BID    Vitamin D  1,000 Units Oral Daily    trospium  20 mg Oral BID AC    metoprolol tartrate  12.5 mg Oral BID    [START ON 4/13/2022] levothyroxine  44 mcg Oral Weekly     Continuous Infusions:   nitroGLYCERIN       PRN Meds:.potassium chloride **OR** potassium alternative oral replacement **OR** potassium chloride, hydrOXYzine, regadenoson, acetaminophen, albuterol sulfate HFA, calcium carbonate, cyclobenzaprine, nitroGLYCERIN, promethazine, traMADol, zolpidem  Continuous Infusions:   nitroGLYCERIN         Intake/Output Summary (Last 24 hours) at 4/8/2022 1622  Last data filed at 4/8/2022 0636  Gross per 24 hour   Intake 300 ml   Output 900 ml   Net -600 ml       Diet:  ADULT DIET;  Regular    Activity:  Activity as tolerated (Patient may move about with assist as indicated or with supervision.)    Follow-up:  in the next week with Serafin Morgan MD,     Consultants:  Cardiology     Procedures:  None    Diagnostic Test:  Stress test  Objective:  Lab Results   Component Value Date    WBC 4.5 04/07/2022    RBC 3.97 04/07/2022    RBC 4.35 11/16/2011    HGB 12.2 04/07/2022    HCT 38.9 04/07/2022    MCV 98.0 04/07/2022    MCH 30.7 04/07/2022    MCHC 31.4 04/07/2022    RDW 12.4 12/01/2021     04/07/2022    MPV 10.5 04/07/2022     Lab Results   Component Value Date     04/07/2022    K 3.9 04/07/2022    K 4.5 04/06/2022     04/07/2022    CO2 17 04/07/2022    BUN 17 04/07/2022    CREATININE 1.0 04/07/2022    GLUCOSE 108 04/07/2022    GLUCOSE 130 04/27/2021    CALCIUM 7.6 04/07/2022     Lab Results   Component Value Date    CALCIUM 7.6 04/07/2022     No results found for: IONCA  Lab Results   Component Value Date    MG 2.2 11/25/2021     Lab Results   Component Value Date    PHOS 3.8 03/13/2015     Lab Results   Component Value Date    .8 (H) 10/03/2013     Lab Results Component Value Date    ALKPHOS 58 04/07/2022    ALT 15 04/07/2022    AST 22 04/07/2022    PROT 5.4 04/07/2022    BILITOT 0.2 04/07/2022    BILIDIR 0.1 04/27/2021    LABALBU 3.3 04/07/2022    LABALBU 4.1 11/16/2011     Lab Results   Component Value Date    LACTA 1.8 12/12/2019     Lab Results   Component Value Date    AMYLASE 30 02/08/2015     Lab Results   Component Value Date    LIPASE 9.5 12/12/2019     Lab Results   Component Value Date    CHOL 137 04/27/2021    CHOL 144 07/08/2015    TRIG 81 04/27/2021    HDL 48 04/27/2021    LDLCALC 67 02/07/2020     No results for input(s): POCGLU in the last 72 hours. No results for input(s): CKTOTAL, CKMB, TROPONINI in the last 72 hours. No results found for: LABA1C  Lab Results   Component Value Date    INR 1.00 05/29/2014    PROTIME 1.11 09/07/2011     No results found for: PHART, PO2ART, YKU7YTU, GDM9HMC, [facility] Course: clinical course has improved    Initial H+P:    The patient is a 57 y.o. male with pmhx of CAD, hypothyroidism, BPH, HLD,  Anxiety who presents with chest pain and lower back pain that has been intermittent for the past few days. Pt has been lifting a lot of heavy things lately which he thinks has caused his back pain. He follows with Dr Piedad aJeger for CAD--Last cath was supposedly a year ago and no stents placed. In the emergency department pt had an unresponsive episode on the way to the bathroom. His orthostatic vitals were positive despite adequate fluid resuscitation. His trops and EKG were non acute. I did order a CTA chest which was negative for aneurysm/dissection or PE. This morning pt was a rapid response on the floor for crushing chest pain relieved by nitro, severe headache, BP in the 210s. He was transferred to step down. His trop is still negative, EKG unchanged. Cardiology has been consulted. Now he is complaining of mild chest pain, a severe headache again and blurry vision that began a few minutes ago. He denies SOB.  He has some abdominal pain on palpation that he attributes to not eating breakfast yet. His EKG has QTc prolongation. His orthostasis did improve. His BP meds were held; turns out he was not taking BP meds at home anyway. His BP remained in 130s once his anxiety was controlled so will not start any at discharge due to initial orthostasis. His headache did resolve; MRI was negative. He was seen by cardiology for chest pain; stress test done negative for acute ischemia. Cardiology ok for discharge today. Pt stable for d/c. Vitals: /79   Pulse 78   Temp 97.5 °F (36.4 °C) (Oral)   Resp 14   Ht 6' (1.829 m)   Wt 179 lb (81.2 kg)   SpO2 99%   BMI 24.28 kg/m²         Disposition: home    Condition: Stable    Over 35 minutes spent on encounter    Assessment and plan of care discussed with supervising physician, Dr Richard Wilburn.         ELLIE Holcomb - CNP, CNP     Copy: Primary Care Physician: Harini Hanson MD  Internal Medicine

## 2022-04-08 NOTE — PROGRESS NOTES
Cardiology Progress Note      Patient:  Kelsy Alvarado  YOB: 1960  MRN: [id number]   Acct: [de-identified]  [facility] Date:  4/6/2022  Primary Cardiologist: Dr. Titus Service cardiologist:  Bradford Yao MD    Rationale for Cardiology consult: chest pain    HPI/PMH: per Dr. Ambika Pitts consult note of 4/7/22:  57 y.o. pleasant male who presented to the hospital with complaints of chest pain. Pt states he has had worsening chest pain at rest since yesterday, was originally a 4/10 and did improve with nitroglycerin. He had noticed worsening shortness of breath and chest pain with exertion over the last month and a half, though this was the first episode at rest. This morning the chest pain returned and spiked to a 7/10 while eating breakfast, chest pain resolved following nitroglycerin gtt. Blood pressure did increase as high as 207/121 during episode of chest pain this AM. Follows with Dr. Mark Latham for cardiology. Describes the pain as a pressure on anterior chest wall, this morning was radiating into the jaw and the left shoulder. Denies any nausea, palpitations. Does endorse feeling dizzy and lightheaded with the pain. EKG 4/7/22: Sinus Tachycardia   Echo 7/31/13: EF 55%, no regional wall motion abnormalities, grade 1 diastolic dysfunction noted, mild to moderate mitral regurgitation, mild tricuspid regurgitation   Troponin Trended <0.010--><0.010--><0.010  P MHX: intermittent angina, anxiety, Asperger's disorder, CHF, GERD, depression, HTN, HLD, seizures, asthma    Chief Complaint: \"Doing ok\"    Subjective (Events in last 24 hours):    Stable OVN  S/P stress test; tolerated well  Denies chest discomfort or dyspnea  Up in room - no distress      Objective:   /70   Pulse 57   Temp 98.3 °F (36.8 °C) (Oral)   Resp 14   Ht 6' (1.829 m)   Wt 179 lb (81.2 kg)   SpO2 97%   BMI 24.28 kg/m²        TELEMETRY: SR 50 - 60's    Physical Exam:  General Appearance: alert and oriented to person, place and time, in no acute distress up in chair at bedside  Cardiovascular: normal rate, regular rhythm, normal S1 and S2, no murmurs, rubs, clicks, or gallops, distal pulses intact, no JVD  Pulmonary/Chest: clear to auscultation bilaterally- no wheezes, rales or rhonchi, normal air movement, no respiratory distress, RA  Abdomen: soft, non-tender, non-distended, normal bowel sounds Extremities: no cyanosis or edema, pulses 2+   Skin: warm and dry, no rash or erythema  Head: normocephalic and atraumatic  Eyes: pupils equal, round, and reactive to light  Neck: supple and non-tender without mass   Musculoskeletal: normal range of motion, no joint swelling, deformity or tenderness  Neurological: alert, oriented, normal speech, no focal findings or movement disorder noted    Medications:    enoxaparin  40 mg SubCUTAneous Daily    [Held by provider] amLODIPine  5 mg Oral Daily    aspirin  81 mg Oral Daily    citalopram  40 mg Oral Daily    clopidogrel  75 mg Oral Daily    docusate sodium  100 mg Oral BID    doxepin  25 mg Oral Nightly    finasteride  5 mg Oral Daily    [Held by provider] isosorbide mononitrate  15 mg Oral Daily    lactobacillus  1 capsule Oral TID    pantoprazole  40 mg Oral BID    polyethylene glycol  17 g Oral BID    pravastatin  40 mg Oral Nightly    [Held by provider] QUEtiapine  500 mg Oral Nightly    sucralfate  1 g Oral 4x Daily AC & HS    levothyroxine  88 mcg Oral Daily    tamsulosin  0.4 mg Oral BID    topiramate  100 mg Oral BID    Vitamin D  1,000 Units Oral Daily    trospium  20 mg Oral BID AC    metoprolol tartrate  12.5 mg Oral BID    [START ON 4/13/2022] levothyroxine  44 mcg Oral Weekly      nitroGLYCERIN       potassium chloride, 40 mEq, PRN   Or  potassium alternative oral replacement, 40 mEq, PRN   Or  potassium chloride, 10 mEq, PRN  hydrOXYzine, 25 mg, 4x Daily PRN  regadenoson, 0.4 mg, ONCE PRN  acetaminophen, 500 mg, Q4H PRN  albuterol sulfate HFA, 2 puff, Q4H PRN  calcium ----------------------------------------------------------------    Stress: 4/8/2022; results pending    Left Heart Cath: not performed    Lab Data:    Cardiac Enzymes:  No results for input(s): CKTOTAL, CKMB, CKMBINDEX, TROPONINI in the last 72 hours. CBC:   Lab Results   Component Value Date    WBC 4.5 04/07/2022    RBC 3.97 04/07/2022    RBC 4.35 11/16/2011    HGB 12.2 04/07/2022    HCT 38.9 04/07/2022     04/07/2022       CMP:    Lab Results   Component Value Date     04/07/2022    K 3.9 04/07/2022    K 4.5 04/06/2022     04/07/2022    CO2 17 04/07/2022    BUN 17 04/07/2022    CREATININE 1.0 04/07/2022    LABGLOM 75 04/07/2022    GLUCOSE 108 04/07/2022    GLUCOSE 130 04/27/2021    CALCIUM 7.6 04/07/2022       Hepatic Function Panel:    Lab Results   Component Value Date    ALKPHOS 58 04/07/2022    ALT 15 04/07/2022    AST 22 04/07/2022    PROT 5.4 04/07/2022    BILITOT 0.2 04/07/2022    BILIDIR 0.1 04/27/2021    LABALBU 3.3 04/07/2022    LABALBU 4.1 11/16/2011       Magnesium:    Lab Results   Component Value Date    MG 2.2 11/25/2021       PT/INR:    Lab Results   Component Value Date    PROTIME 1.11 09/07/2011    INR 1.00 05/29/2014       HgBA1c:  No results found for: LABA1C    FLP:    Lab Results   Component Value Date    TRIG 81 04/27/2021    HDL 48 04/27/2021    LDLCALC 67 02/07/2020    LDLDIRECT 79 04/27/2021       TSH:    Lab Results   Component Value Date    TSH 0.626 04/06/2022         Assessment/Plan:  · Chest pain; possible angina  · Denies chest discomfort  · ASA, Plavix, BB PTA  · VSS  · Serial Troponin < 0.010   · Stress test 4/8/22 - results pending  · HTN - BP well controlled  · HLD - pravastatin  · Anxiety  · Asperger's disorder  · CHF  · Echo 4/6/22  · EF 55 - 60%; nml LV, nml RV, mild MR  · GERD  · Depression  · HTN  · HLD  · Seizures  · Asthma  · Hypothyroid - Synthroid  · Chronic neck pain    Await results of stress test.  Will follow.         Electronically signed by Kenia Thomas, APRN - CNP on 4/8/2022 at 1:58 PM

## 2022-04-08 NOTE — CARE COORDINATION
4/8/22, 2:24 PM EDT    DISCHARGE ON [address] day: 0  Location: -06/006-A Reason for admit: Orthostatic hypotension [I95.1]  Back strain, initial encounter [M56.574U]  Chest pain, unspecified type [R07.9]   Procedure:   4/8 Cardiac stress test  Barriers to Discharge: Cardiology consulted,  PT/OT. Asa, Plavix, Lovenox, Protonix, potassium replacement protocols. PCP: Lenard Mcdonald MD  Readmission Risk Score: 17.1 ( )%  Patient Goals/Plan/Treatment Preferences: Plan is to return to [facility]. SW following.

## 2022-04-08 NOTE — PROGRESS NOTES
[address] [address]  INPATIENT PHYSICAL THERAPY  EVALUATION  [facility] 4A - 4A-06/006-A    Time In: 0730  Time Out: 5796  Timed Code Treatment Minutes: 8 Minutes  Minutes: 13          Date: 2022  Patient Name: Quang Beltran,  Gender:  male        MRN: [id number]  : 1960  (57 y.o.)      Referring Practitioner: ELLIE Spaulding CNP  Diagnosis: Orthostatic hypotension  Additional Pertinent Hx: The patient is a 57 y.o. male with pmhx of CAD, hypothyroidism, BPH, HLD,  Anxiety who presents with chest pain and lower back pain that has been intermittent for the past few days. Pt has been lifting a lot of heavy things lately which he thinks has caused his back pain. He follows with Dr Mabel Fofana for CAD--Last cath was supposedly a year ago and no stents placed. In the emergency department pt had an unresponsive episode on the way to the bathroom. His orthostatic vitals were positive despite adequate fluid resuscitation. His trops and EKG were non acute. I did order a CTA chest which was negative for aneurysm/dissection or PE. This morning pt was a rapid response on the floor for crushing chest pain relieved by nitro, severe headache, BP in the 210s. He was transferred to step down. His trop is still negative, EKG unchanged. Cardiology has been consulted. Now he is complaining of mild chest pain, a severe headache again and blurry vision that began a few minutes ago. He denies SOB. He has some abdominal pain on palpation that he attributes to not eating breakfast yet. His EKG has QTc prolongation. Restrictions/Precautions:  Restrictions/Precautions: General Precautions,Fall Risk    Subjective:  Chart Reviewed: Yes  Patient assessed for rehabilitation services?: Yes  Family / Caregiver Present: No  Subjective: RN approved session.  Pt pleasant and agreeable to therapy    General:  Overall Orientation Status: Within Functional Limits  Follows Commands: Within Functional Limits    Vision: Within Functional Limits    Hearing: Within functional limits         Pain: 0/10: denies pain     Vitals: Vitals not assessed per clinical judgement, see nursing flowsheet    Social/Functional History:    Lives With: Alone  Home Layout: One level  Home Access: Level entry  Home Equipment:  (noAD)             ADL Assistance: Independent  Homemaking Assistance: Independent  Ambulation Assistance: Independent  Transfer Assistance: Independent    Active : Yes          OBJECTIVE:  Range of Motion:  Bilateral Lower Extremity: WFL    Strength:  Bilateral Lower Extremity: WFL    Balance:  Static Sitting Balance:  Stand By Assistance  Static Standing Balance: Stand By Assistance    Bed Mobility:  Supine to Sit: Supervision  Sit to Supine: Supervision     Transfers:  Sit to Stand: Stand By Assistance, Contact Guard Assistance  Stand to Sit:Stand By Assistance, Contact Guard Assistance    Ambulation:  Stand By Assistance, Contact Guard Assistance  Distance: 60'   Surface: Level Tile  Device:No Device  Gait Deviations:  Decreased Step Length Bilaterally, Decreased Gait Speed and Mild Path Deviations    Functional Outcome Measures: Completed  AM-PAC Inpatient Mobility Raw Score : 18  AM-PAC Inpatient T-Scale Score : 43.63    ASSESSMENT:  Activity Tolerance:  Patient tolerance of  treatment: good. Treatment Initiated: Treatment and education initiated within context of evaluation. Evaluation time included review of current medical information, gathering information related to past medical, social and functional history, completion of standardized testing, formal and informal observation of tasks, assessment of data and development of plan of care and goals. Treatment time included skilled education and facilitation of tasks to increase safety and independence with functional mobility for improved independence and quality of life. Assessment:   Body structures, Functions, Activity limitations: Decreased functional mobility ,Decreased posture,Decreased strength,Decreased endurance  Assessment: Pt demonstrates a decrease in baseline by way of bed mobility, transfers and ambulation secondary to decreased activity tolerance, strength, fatigue, and balance deficits. Pt will benefit from skilled PT services throughout admission and beyond hospital discharge for improvements in functional mobility and in order to decrease fall risk and return pt to PLOF. Prognosis: Good    REQUIRES PT FOLLOW UP: Yes    Discharge Recommendations:  Discharge Recommendations: Home with assist PRN (return to [place])    Patient Education:  PT Education: Rashawn Clemons of Care,Functional Mobility Training    Equipment Recommendations:  Equipment Needed: No    Plan:  Times per week: 3-5x GM  Times per day: Daily  Current Treatment Recommendations: Strengthening,Gait Training,Functional Mobility Training,Safety Education & Training    Goals:  Patient goals : none stated  Short term goals  Time Frame for Short term goals: by discharge  Short term goal 1: bed mobility with HOB flat, no rails, mod I for increased functional ind  Short term goal 2: sit<>stand from various surfaces with LRD mod I for safe transfers  Short term goal 3: ambulate Bertha  1560' with LRD mod I for safe household distances  Long term goals  Time Frame for Long term goals : NA d/t short ELOS    Following session, patient left in safe position with all fall risk precautions in place.     Emmanuel Constantino PT, DPT

## 2022-04-09 VITALS
TEMPERATURE: 97.7 F | RESPIRATION RATE: 18 BRPM | SYSTOLIC BLOOD PRESSURE: 122 MMHG | OXYGEN SATURATION: 98 % | DIASTOLIC BLOOD PRESSURE: 90 MMHG | WEIGHT: 177.8 LBS | HEART RATE: 65 BPM | HEIGHT: 72 IN | BODY MASS INDEX: 24.08 KG/M2

## 2022-04-09 PROCEDURE — 6370000000 HC RX 637 (ALT 250 FOR IP): Performed by: INTERNAL MEDICINE

## 2022-04-09 PROCEDURE — 6360000002 HC RX W HCPCS: Performed by: REGISTERED NURSE

## 2022-04-09 PROCEDURE — 6370000000 HC RX 637 (ALT 250 FOR IP): Performed by: REGISTERED NURSE

## 2022-04-09 RX ADMIN — Medication 1 CAPSULE: at 08:33

## 2022-04-09 RX ADMIN — CYCLOBENZAPRINE 10 MG: 10 TABLET, FILM COATED ORAL at 03:47

## 2022-04-09 RX ADMIN — ACETAMINOPHEN 500 MG: 500 TABLET ORAL at 03:47

## 2022-04-09 RX ADMIN — TROSPIUM CHLORIDE 20 MG: 20 TABLET, FILM COATED ORAL at 07:00

## 2022-04-09 RX ADMIN — PANTOPRAZOLE SODIUM 40 MG: 40 TABLET, DELAYED RELEASE ORAL at 08:26

## 2022-04-09 RX ADMIN — ASPIRIN 81 MG: 81 TABLET, COATED ORAL at 08:17

## 2022-04-09 RX ADMIN — DOCUSATE SODIUM 100 MG: 100 CAPSULE, LIQUID FILLED ORAL at 08:17

## 2022-04-09 RX ADMIN — Medication 1000 UNITS: at 08:18

## 2022-04-09 RX ADMIN — TAMSULOSIN HYDROCHLORIDE 0.4 MG: 0.4 CAPSULE ORAL at 08:24

## 2022-04-09 RX ADMIN — TOPIRAMATE 100 MG: 100 TABLET, FILM COATED ORAL at 08:24

## 2022-04-09 RX ADMIN — FINASTERIDE 5 MG: 5 TABLET, FILM COATED ORAL at 08:23

## 2022-04-09 RX ADMIN — SUCRALFATE 1 G: 1 TABLET ORAL at 07:00

## 2022-04-09 RX ADMIN — METOPROLOL TARTRATE 12.5 MG: 25 TABLET, FILM COATED ORAL at 08:18

## 2022-04-09 RX ADMIN — ENOXAPARIN SODIUM 40 MG: 40 INJECTION SUBCUTANEOUS at 08:23

## 2022-04-09 RX ADMIN — LEVOTHYROXINE SODIUM 88 MCG: 0.09 TABLET ORAL at 07:00

## 2022-04-09 RX ADMIN — CITALOPRAM 40 MG: 40 TABLET, FILM COATED ORAL at 08:18

## 2022-04-09 RX ADMIN — CLOPIDOGREL BISULFATE 75 MG: 75 TABLET ORAL at 08:17

## 2022-04-09 ASSESSMENT — PAIN DESCRIPTION - PAIN TYPE: TYPE: ACUTE PAIN

## 2022-04-09 ASSESSMENT — PAIN - FUNCTIONAL ASSESSMENT: PAIN_FUNCTIONAL_ASSESSMENT: ACTIVITIES ARE NOT PREVENTED

## 2022-04-09 ASSESSMENT — PAIN DESCRIPTION - FREQUENCY: FREQUENCY: CONTINUOUS

## 2022-04-09 ASSESSMENT — PAIN DESCRIPTION - LOCATION: LOCATION: HEAD;BACK

## 2022-04-09 ASSESSMENT — PAIN DESCRIPTION - ORIENTATION: ORIENTATION: ANTERIOR;MID;LOWER

## 2022-04-09 ASSESSMENT — PAIN DESCRIPTION - ONSET: ONSET: GRADUAL

## 2022-04-09 ASSESSMENT — PAIN DESCRIPTION - PROGRESSION: CLINICAL_PROGRESSION: GRADUALLY WORSENING

## 2022-04-09 ASSESSMENT — PAIN SCALES - GENERAL
PAINLEVEL_OUTOF10: 0
PAINLEVEL_OUTOF10: 3
PAINLEVEL_OUTOF10: 0

## 2022-04-09 ASSESSMENT — PAIN DESCRIPTION - DESCRIPTORS: DESCRIPTORS: ACHING;DISCOMFORT;HEADACHE

## 2022-04-09 NOTE — CONSULTS
[address] [place] [address]                                  CONSULTATION    PATIENT NAME: Lexus Schwarz                     :        1960  MED REC NO:   [id number]                           ROOM:       0006  ACCOUNT NO:   [de-identified]                           ADMIT DATE: 2022  PROVIDER:     GISEL Garay DATE:  2022    CONSULT TO:  Garcia Ontiveros CNP    REASON FOR CONSULT:  Uncontrolled anxiety. SOURCES OF INFORMATION:  The patient, nursing staff and electronic  medical records. IDENTIFYING INFORMATION:  The patient is a 58-year-old    male. He has no children. He lives alone. He is disabled. HISTORY OF PRESENT ILLNESS:  The patient presents to [facility] ED due to chest pain and lower back pain. He has a history of  coronary artery disease and was admitted for further management. He  also has a history of depression and anxiety. According to nursing  staff, he was crying uncontrollably. He reports a history of depression  since he was a teenager. He says that four months ago, there were some  people who moved in the apartment complex where he lives. Since then,  there is a change of environment. He reports feeling down at times due  to his living condition. He also reports trouble getting and staying  asleep at times. He feels hopeless at times, but he denies suicidal  thoughts. He denies feeling anxious, but according to records, he has a  history of anxiety. He denies hypomanic or manic symptoms. No  psychotic symptoms. No history of abuse or trauma. Of note, he is of  low intellectual functioning. PAST PSYCHIATRIC HISTORY:  One psychiatric admission on 4E in 2012. It is not clear if he has been to a neuropsychiatric hospital.  He says  he used to go to [facility] in [place], but he has not been there for over  a year.   It is not clear if he has been on different psychotropics over  the years. FAMILY HISTORY:  He reports some of his aunt and uncles committed  suicide. His father was a recovering alcoholic. Some of his aunts were  alcoholic as well as his paternal grandfather. SOCIAL HISTORY:  The patient was born in [facility], raised in [place]. Parents were . They are . He has a full brother and two  full sisters. One of his sisters is his guardian. He graduated from  high school. He was in special education. He was  for eight  years. His wife  in . He says he has been engaged currently  for 14 months. He has no children. He lives alone. He has been  disabled since [address],[facility]. He has worked in [employer]. 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 He denies illicit drugs or alcohol. He  does not smoke cigarettes. No legal trouble. He is a member of [employer] in [place], but does not go to  [employer] anymore. MEDICAL AND SURGICAL HISTORY:  Coronary artery disease, asthma, CHF,  GERD, dyslipidemia, hypertension, seizure disorder, tremors, cardiac  surgery, cholecystectomy, colonoscopy, esophageal dilation, endoscopy,  knee arthroscopy, sigmoidoscopy, TURP. MEDICATIONS:  Baby aspirin, citalopram 40 mg daily, Plavix, Colace,  doxepin 25 mg at bedtime, Lovenox, Proscar, Synthroid, lactobacillus,  Lopressor, Protonix, GlycoLax, Pravachol, Carafate, Flomax, Topamax,  Sanctura, vitamin D. ALLERGIES:  CAFFEINE, PERCODAN, BUPROPION, OXYCODONE, DICYCLOMINE. MENTAL STATUS EXAMINATION:  The patient appears older than stated age,  dressed in a hospital gown. He has good eye contact. Poor grooming and  hygiene. He is cooperative with the interview. Speech clear, coherent  and spontaneous. Mood dysthymic and affect blunted. He denies suicidal  or homicidal ideations. No psychosis.   No mood swings. No flight of  ideas and no racing thoughts. Thought process is goal oriented. He is  alert and oriented x3. He has fair attention and concentration. Memory  appears to be intact as tested within the context of the interview. Intelligence appears below average. Judgment and insight are limited. DIAGNOSES:  1.  Major depressive disorder, recurrent, in partial remission. 2.  Unspecified anxiety disorder. 3.  Intellectual disability, mild. 4.  See medical and surgical history. 5.  Chronic medical and psychiatric issues. ASSESSMENT:  The patient is a 68-year-old   male. He  was brought to [facility] due to back and chest pain. He  was admitted for further management. He has a history of coronary  artery disease. He also has a history of depression and anxiety. He  denies major depressive or anxiety symptoms, but according to nursing  staff, he was crying uncontrollably. The patient denies feeling  anxious. It is not clear if he has a history of mood disorder or  schizoaffective disorder since apparently he was on a high dose of  quetiapine. He says he used to follow up as outpatient at [facility], but  he has not been there for over a year. PLAN:  1. Continue medical management per Kwame Serrano and her associates. 2.  Continue with psychotropics, but increase hydroxyzine 50 mg q.6 h.  p.r.n. for anxiety. Consider buspirone. 3.  Risks and benefits of psychotropics discussed as well as alternative  treatment. 4.  Support and reassurance given. 5.  Encourage the patient to follow up as outpatient at [facility]. Thanks Kwame Serrano for allowing me to participate in the care of this  patient.         Tessie Melgoza M.D.    D: 04/08/2022 20:52:26       T: 04/08/2022 23:34:07     [email or web address]  Job#: [id number]     Doc#: [id number]    CC:  Kwame Serrano, CNP

## 2022-04-09 NOTE — PLAN OF CARE
Problem: Pain:  Goal: Pain level will decrease  Description: Pain level will decrease  Outcome: Ongoing  Note: Patient denies pain during this shift. Will continue to monitor. Problem: Pain:  Goal: Control of acute pain  Description: Control of acute pain  Outcome: Ongoing  Note: Patient denies pain during this shift. Will continue to monitor. Problem: Pain:  Goal: Control of chronic pain  Description: Control of chronic pain  Outcome: Ongoing  Note: Patient denies pain during this shift. Will continue to monitor. Problem: Falls - Risk of:  Goal: Will remain free from falls  Description: Will remain free from falls  Outcome: Ongoing  Note: Patient educated on and encouraged to use the call light for assistance from staff with needs. Patient verbalizes an understanding of this. Bed wheels locked and bed in lowest position; bed alarm on. Patient possessions within reach; pathways clear at this time. Problem: Skin Integrity:  Goal: Will show no infection signs and symptoms  Description: Will show no infection signs and symptoms  Outcome: Ongoing  Note: No signs and/or symptoms of infection noted during this shift. Patient afebrile during this shift. No new skin breakdown noted during this shift. Patient able to turn self in bed; staff assistance provided as needed. Foot of bed elevated. Problem: Skin Integrity:  Goal: Absence of new skin breakdown  Description: Absence of new skin breakdown  Outcome: Ongoing  Note: No signs and/or symptoms of infection noted during this shift. Patient afebrile during this shift. No new skin breakdown noted during this shift. Patient able to turn self in bed; staff assistance provided as needed. Foot of bed elevated. Problem: Discharge Planning:  Goal: Discharged to appropriate level of care  Description: Discharged to appropriate level of care  Outcome: Ongoing  Note: Patient and family actively involved in [address] [place] [address].  Patient planning d/c 4/9/22 in AM back to

## 2022-04-12 NOTE — PROGRESS NOTES
Physician Progress Note      PATIENT:               Daniel Brush  CSN #:                  [id number]  :                       1960  ADMIT DATE:       2022 1:49 PM  [address] DATE:        2022 9:30 AM  RESPONDING  PROVIDER #:        Whitney Harmon          QUERY TEXT:    Pt admitted with chest pain and noted to be caused by possibly anxiety in    progress note vs angina per cardiology  progress note. If possible, please   document in progress notes and discharge summary if you are evaluating and/or   treating any of the following: The medical record reflects the following:  Risk Factors: chest pain  Clinical Indicators: presented with CP, stress test negative, cardio note   reflects possible angina, last progress note from  questions anxiety related   CP; troponins <0.010; HTN resolved quickly after SL nitro given  Treatment: SL nitro with relief of CP and HTN, stress test, labs, imaging  Options provided:  -- Chest pain due to CAD with unstable angina  -- Chest pain due to anxiety  -- Chest pain due to hypertensive urgency  -- Chest pain due to orthostatic hypotension  -- Other - I will add my own diagnosis  -- Disagree - Not applicable / Not valid  -- Disagree - Clinically unable to determine / Unknown  -- Refer to Clinical Documentation Reviewer    PROVIDER RESPONSE TEXT:    This patient has chest pain due to hypertensive urgency.     Query created by: Bird Mcpherson on 2022 7:53 AM      Electronically signed by:  Whitney Harmon 2022 10:09 AM

## 2022-04-28 ENCOUNTER — HOSPITAL ENCOUNTER (OUTPATIENT)
Age: 62
Discharge: HOME | End: 2022-04-28
Payer: MEDICAID

## 2022-04-28 ENCOUNTER — HOSPITAL ENCOUNTER (OUTPATIENT)
Dept: GENERAL RADIOLOGY | Age: 62
Discharge: HOME | End: 2022-04-28
Payer: MEDICAID

## 2022-04-28 DIAGNOSIS — S62.511A CLOSED DISPLACED FRACTURE OF PROXIMAL PHALANX OF RIGHT THUMB, INITIAL ENCOUNTER: ICD-10-CM

## 2022-04-28 PROCEDURE — 73140 X-RAY EXAM OF FINGER(S): CPT

## 2022-05-16 VITALS — SYSTOLIC BLOOD PRESSURE: 114 MMHG | BODY MASS INDEX: 24.41 KG/M2 | WEIGHT: 175 LBS | DIASTOLIC BLOOD PRESSURE: 62 MMHG

## 2022-06-21 ENCOUNTER — APPOINTMENT (OUTPATIENT)
Dept: GENERAL RADIOLOGY | Age: 62
End: 2022-06-21
Payer: MEDICAID

## 2022-06-21 ENCOUNTER — HOSPITAL ENCOUNTER (EMERGENCY)
Age: 62
Discharge: HOME | End: 2022-06-21
Payer: MEDICAID

## 2022-06-21 VITALS
TEMPERATURE: 100 F | HEART RATE: 57 BPM | HEIGHT: 72 IN | BODY MASS INDEX: 22.35 KG/M2 | RESPIRATION RATE: 19 BRPM | SYSTOLIC BLOOD PRESSURE: 115 MMHG | OXYGEN SATURATION: 88 % | WEIGHT: 165 LBS | DIASTOLIC BLOOD PRESSURE: 59 MMHG

## 2022-06-21 DIAGNOSIS — T50.B95A FEVER AFTER COVID-19 VACCINATION: Primary | ICD-10-CM

## 2022-06-21 DIAGNOSIS — R06.02 SHORTNESS OF BREATH: ICD-10-CM

## 2022-06-21 DIAGNOSIS — R50.83 FEVER AFTER COVID-19 VACCINATION: Primary | ICD-10-CM

## 2022-06-21 LAB
ALBUMIN SERPL-MCNC: 4.1 G/DL (ref 3.5–5.1)
ALP BLD-CCNC: 74 U/L (ref 38–126)
ALT SERPL-CCNC: 11 U/L (ref 11–66)
ANION GAP SERPL CALCULATED.3IONS-SCNC: 9 MEQ/L (ref 8–16)
AST SERPL-CCNC: 16 U/L (ref 5–40)
BASOPHILS # BLD: 0.5 %
BASOPHILS ABSOLUTE: 0 THOU/MM3 (ref 0–0.1)
BILIRUB SERPL-MCNC: 0.3 MG/DL (ref 0.3–1.2)
BILIRUBIN DIRECT: < 0.2 MG/DL (ref 0–0.3)
BUN BLDV-MCNC: 22 MG/DL (ref 7–22)
CALCIUM SERPL-MCNC: 8.8 MG/DL (ref 8.5–10.5)
CHLORIDE BLD-SCNC: 107 MEQ/L (ref 98–111)
CO2: 20 MEQ/L (ref 23–33)
CREAT SERPL-MCNC: 1.1 MG/DL (ref 0.4–1.2)
EKG ATRIAL RATE: 70 BPM
EKG P AXIS: 55 DEGREES
EKG P-R INTERVAL: 134 MS
EKG Q-T INTERVAL: 446 MS
EKG QRS DURATION: 126 MS
EKG QTC CALCULATION (BAZETT): 481 MS
EKG R AXIS: 3 DEGREES
EKG T AXIS: 42 DEGREES
EKG VENTRICULAR RATE: 70 BPM
EOSINOPHIL # BLD: 0.5 %
EOSINOPHILS ABSOLUTE: 0 THOU/MM3 (ref 0–0.4)
ERYTHROCYTE [DISTWIDTH] IN BLOOD BY AUTOMATED COUNT: 12.3 % (ref 11.5–14.5)
ERYTHROCYTE [DISTWIDTH] IN BLOOD BY AUTOMATED COUNT: 42.7 FL (ref 35–45)
FLU A ANTIGEN: NEGATIVE
FLU B ANTIGEN: NEGATIVE
GFR SERPL CREATININE-BSD FRML MDRD: 67 ML/MIN/1.73M2
GLUCOSE BLD-MCNC: 123 MG/DL (ref 70–108)
HCT VFR BLD CALC: 37.7 % (ref 42–52)
HEMOGLOBIN: 12.2 GM/DL (ref 14–18)
IMMATURE GRANS (ABS): 0.02 THOU/MM3 (ref 0–0.07)
IMMATURE GRANULOCYTES: 0.3 %
LIPASE: 14.4 U/L (ref 5.6–51.3)
LYMPHOCYTES # BLD: 9.9 %
LYMPHOCYTES ABSOLUTE: 0.6 THOU/MM3 (ref 1–4.8)
MCH RBC QN AUTO: 30.8 PG (ref 26–33)
MCHC RBC AUTO-ENTMCNC: 32.4 GM/DL (ref 32.2–35.5)
MCV RBC AUTO: 95.2 FL (ref 80–94)
MONOCYTES # BLD: 4.7 %
MONOCYTES ABSOLUTE: 0.3 THOU/MM3 (ref 0.4–1.3)
NUCLEATED RED BLOOD CELLS: 0 /100 WBC
OSMOLALITY CALCULATION: 276.7 MOSMOL/KG (ref 275–300)
PLATELET # BLD: 174 THOU/MM3 (ref 130–400)
PMV BLD AUTO: 10 FL (ref 9.4–12.4)
POTASSIUM REFLEX MAGNESIUM: 4.2 MEQ/L (ref 3.5–5.2)
PRO-BNP: 614.5 PG/ML (ref 0–900)
RBC # BLD: 3.96 MILL/MM3 (ref 4.7–6.1)
SARS-COV-2, NAAT: NOT  DETECTED
SEG NEUTROPHILS: 84.1 %
SEGMENTED NEUTROPHILS ABSOLUTE COUNT: 5.1 THOU/MM3 (ref 1.8–7.7)
SODIUM BLD-SCNC: 136 MEQ/L (ref 135–145)
TOTAL PROTEIN: 6.7 G/DL (ref 6.1–8)
TROPONIN T: < 0.01 NG/ML
WBC # BLD: 6.1 THOU/MM3 (ref 4.8–10.8)

## 2022-06-21 PROCEDURE — 83690 ASSAY OF LIPASE: CPT

## 2022-06-21 PROCEDURE — 71045 X-RAY EXAM CHEST 1 VIEW: CPT

## 2022-06-21 PROCEDURE — 87804 INFLUENZA ASSAY W/OPTIC: CPT

## 2022-06-21 PROCEDURE — 83880 ASSAY OF NATRIURETIC PEPTIDE: CPT

## 2022-06-21 PROCEDURE — 80076 HEPATIC FUNCTION PANEL: CPT

## 2022-06-21 PROCEDURE — 85025 COMPLETE CBC W/AUTO DIFF WBC: CPT

## 2022-06-21 PROCEDURE — 93005 ELECTROCARDIOGRAM TRACING: CPT | Performed by: NURSE PRACTITIONER

## 2022-06-21 PROCEDURE — 87635 SARS-COV-2 COVID-19 AMP PRB: CPT

## 2022-06-21 PROCEDURE — 80048 BASIC METABOLIC PNL TOTAL CA: CPT

## 2022-06-21 PROCEDURE — 6370000000 HC RX 637 (ALT 250 FOR IP): Performed by: NURSE PRACTITIONER

## 2022-06-21 PROCEDURE — 93010 ELECTROCARDIOGRAM REPORT: CPT | Performed by: NUCLEAR MEDICINE

## 2022-06-21 PROCEDURE — 84484 ASSAY OF TROPONIN QUANT: CPT

## 2022-06-21 PROCEDURE — 99285 EMERGENCY DEPT VISIT HI MDM: CPT

## 2022-06-21 RX ORDER — ACETAMINOPHEN 500 MG
1000 TABLET ORAL ONCE
Status: COMPLETED | OUTPATIENT
Start: 2022-06-21 | End: 2022-06-21

## 2022-06-21 RX ADMIN — ACETAMINOPHEN 1000 MG: 500 TABLET ORAL at 05:28

## 2022-06-21 ASSESSMENT — ENCOUNTER SYMPTOMS
COUGH: 0
SHORTNESS OF BREATH: 1
DIARRHEA: 0
EYE PAIN: 0
CONSTIPATION: 0
WHEEZING: 0
SINUS PRESSURE: 0
RHINORRHEA: 0
NAUSEA: 0
BLOOD IN STOOL: 0
VOMITING: 0
ABDOMINAL PAIN: 0

## 2022-06-21 ASSESSMENT — PAIN - FUNCTIONAL ASSESSMENT: PAIN_FUNCTIONAL_ASSESSMENT: 0-10

## 2022-06-21 ASSESSMENT — PAIN DESCRIPTION - LOCATION: LOCATION: SHOULDER;HEAD

## 2022-06-21 ASSESSMENT — PAIN SCALES - GENERAL: PAINLEVEL_OUTOF10: 8

## 2022-06-21 NOTE — ED PROVIDER NOTES
[facility] [address] [address] EMERGENCY DEPT  EMERGENCY MEDICINE     Pt Name: Gladis Holliday  MRN: [id number]  [place] 1960  Date of evaluation: 6/21/2022  PCP:    Mariusz Donahue MD  Provider: ELLIE Carreon - KANU    CHIEF COMPLAINT       Chief Complaint   Patient presents with    Shortness of Breath    Numbness           HISTORY OF PRESENT ILLNESS    Gladis Holliday is a 57 y.o. male patient that presents to ER with shortness of breath, cough and generalized malaise following getting his COVID-19 vaccination yesterday. Patient states that he had the same reaction to the last vaccination, but this time its worse. Patient states that he has numbness in the arm that he got the injection, but that is not uncommon for him as he has cervical radiculopathy which she gets injections for. Patient states that he got the right side done, but still needs to get the left side. Patient states that he has a weakness on the left side that has to do with his cervical spine pathology. Patient does state that around 8 PM he also had some chest pain, but it was relieved with 1 nitroglycerin. Patient denies current chest pain, nausea, vomiting or diarrhea. Triage notes and Nursing notes were reviewed by myself. Any discrepancies are addressed above.     PAST MEDICAL HISTORY     Past Medical History:   Diagnosis Date    Angina at rest [facility])     intermittent    Anxiety     Anxiety     Arthritis     Asperger's disorder     Asthma     CAD (coronary artery disease)     Cervical radiculopathy     Chest pain     CHF (congestive heart failure) (HCC)     COPD (chronic obstructive pulmonary disease) (HCC)     Depression     Depression     Gall stones     GERD (gastroesophageal reflux disease)     Hyperlipidemia     Hypertension     Mentally challenged     Seizures ([facility] [address]     Thyroid disease     Tremor        SURGICAL HISTORY       Past Surgical History:   Procedure Laterality Date    ABDOMINAL SURGERY      CARDIAC SURGERY      Heart Cath    CHOLECYSTECTOMY  2002    CHOLECYSTECTOMY, LAPAROSCOPIC      COLONOSCOPY      COLONOSCOPY      DILATATION, ESOPHAGUS      ENDOSCOPY, COLON, DIAGNOSTIC      ESOPHAGEAL DILATION      KNEE ARTHROSCOPY Left 1984    NECK SURGERY  1996    plate in neck and fusion    SIGMOIDOSCOPY Left 6/30/2020    SIGMOIDOSCOPY DIAGNOSTIC FLEXIBLE performed by Emma Segovia MD at [address] [place] [address]  2/11/2016    UPPER GASTROINTESTINAL ENDOSCOPY         CURRENT MEDICATIONS       Previous Medications    ACETAMINOPHEN (TYLENOL) 500 MG TABLET    Take 500 mg by mouth every 4 hours as needed for Pain    ALBUTEROL SULFATE (PROAIR HFA IN)    Inhale 90 mcg into the lungs every 4 hours as needed    ARTIFICIAL TEAR OP    Apply to eye 1 drop in each eye 2-4 times per day    ASPIRIN 81 MG TABLET    Take 81 mg by mouth daily    BENZONATATE (TESSALON) 100 MG CAPSULE    Take 100 mg by mouth daily. CALCIUM CARBONATE (TUMS) 500 MG CHEWABLE TABLET    Take 1 tablet by mouth as needed for Heartburn    CITALOPRAM (CELEXA) 40 MG TABLET    Take 1 tablet by mouth daily. CLOPIDOGREL (PLAVIX) 75 MG TABLET    Take 75 mg by mouth daily    CYCLOBENZAPRINE (FLEXERIL) 10 MG TABLET    Take 10 mg by mouth 2 times daily as needed for Muscle spasms    DOCUSATE SODIUM (COLACE) 100 MG CAPSULE    Take 100 mg by mouth 2 times daily    DOXEPIN (SINEQUAN) 25 MG CAPSULE    Take 25 mg by mouth nightly    FINASTERIDE (PROSCAR) 5 MG TABLET    Take 5 mg by mouth daily    GUAIFENESIN (MUCUS RELIEF) 600 MG EXTENDED RELEASE TABLET    Take 600 mg by mouth 2 times daily     HYDROCODONE-ACETAMINOPHEN (NORCO) 5-325 MG PER TABLET    Take 1 tablet by mouth every 6 hours as needed for Pain .     HYDROXYZINE (ATARAX) 25 MG TABLET    Take 25 mg by mouth 4 times daily as needed for Itching    IPRATROPIUM-ALBUTEROL (DUONEB) 0.5-2.5 (3) MG/3ML SOLN NEBULIZER SOLUTION    Inhale 3 mLs into the lungs every 4 hours as convulsions    Caffeine      Nervous and shakes all over    Percodan [Oxycodone-Aspirin]     Tape Clement Guarneri Tape] Dermatitis    Zyban [Bupropion Hcl]     Dicyclomine Hcl Nausea And Vomiting    Oxycodone Nausea And Vomiting       FAMILY HISTORY       Family History   Problem Relation Age of Onset    Cancer Mother     Diabetes Mother     Heart Disease Mother     High Blood Pressure Mother     High Cholesterol Mother     Arthritis Father     Heart Disease Father     Cancer Father     Diabetes Father     Substance Abuse Father     Depression Father     Obesity Brother     Asthma Brother     Mental Retardation Brother     High Blood Pressure Sister     High Cholesterol Sister         SOCIAL HISTORY       Social History     Socioeconomic History    Marital status: Single     Spouse name: Not on file    Number of children: Not on file    Years of education: Not on file    Highest education level: Not on file   Occupational History    Not on file   Tobacco Use    Smoking status: Never Smoker    Smokeless tobacco: Never Used   Vaping Use    Vaping Use: Never used   Substance and Sexual Activity    Alcohol use: No     Alcohol/week: 0.0 standard drinks    Drug use: No    Sexual activity: Not on file   Other Topics Concern    Not on file   Social History Narrative    Not on file     Social Determinants of Health     Financial Resource Strain:     Difficulty of Paying Living Expenses: Not on file   Food Insecurity:     Worried About Running Out of Food in the Last Year: Not on file    Akash of Food in the Last Year: Not on file   Transportation Needs:     Lack of Transportation (Medical): Not on file    Lack of Transportation (Non-Medical):  Not on file   Physical Activity:     Days of Exercise per Week: Not on file    Minutes of Exercise per Session: Not on file   Stress:     Feeling of Stress : Not on file   Social Connections:     Frequency of Communication with Friends and Family: Not on file    Frequency of Social Gatherings with Friends and Family: Not on file    Attends [employer] Services: Not on file    Active Member of Clubs or Organizations: Not on file    Attends Club or Organization Meetings: Not on file    Marital Status: Not on file   Intimate Partner Violence:     Fear of Current or Ex-Partner: Not on file    Emotionally Abused: Not on file    Physically Abused: Not on file    Sexually Abused: Not on file   Housing Stability:     Unable to Pay for Housing in the Last Year: Not on file    Number of [place] in the Last Year: Not on file    Unstable Housing in the Last Year: Not on file       REVIEW OF SYSTEMS     Review of Systems   Constitutional: Positive for fatigue. Negative for appetite change, chills, fever and unexpected weight change. HENT: Negative for ear pain, rhinorrhea and sinus pressure. Eyes: Negative for pain and visual disturbance. Respiratory: Positive for shortness of breath. Negative for cough and wheezing. Cardiovascular: Positive for chest pain. Negative for palpitations and leg swelling. Gastrointestinal: Negative for abdominal pain, blood in stool, constipation, diarrhea, nausea and vomiting. Genitourinary: Negative for dysuria, frequency and hematuria. Musculoskeletal: Negative for arthralgias and joint swelling. Skin: Negative for rash. Neurological: Positive for weakness. Negative for dizziness, syncope, light-headedness and headaches. Hematological: Does not bruise/bleed easily. Except as noted above the remainder of the review of systems was reviewed and is negative.   SCREENINGS        [place] Coma Scale  Eye Opening: Spontaneous  Best Verbal Response: Oriented  Best Motor Response: Obeys commands  [place] Coma Scale Score: 15               PHYSICAL EXAM    (up to 7 for level 4, 8 or more for level 5)     ED Triage Vitals [02/19/22 1512]   BP Temp Temp Source Pulse Resp SpO2 Height Weight   (!) 134/95 98.2 °F (36.8 °C) Oral 124 16 100 % -- --       Physical Exam  Vitals and nursing note reviewed. Constitutional:       General: He is not in acute distress. Appearance: He is well-developed. He is not diaphoretic. HENT:      Head: Normocephalic and atraumatic. Eyes:      Conjunctiva/sclera: Conjunctivae normal.      Pupils: Pupils are equal, round, and reactive to light. Cardiovascular:      Rate and Rhythm: Normal rate and regular rhythm. Heart sounds: Normal heart sounds. No murmur heard. No gallop. Pulmonary:      Effort: Pulmonary effort is normal. No respiratory distress. Breath sounds: Normal breath sounds. No wheezing or rales. Abdominal:      General: Bowel sounds are normal.      Palpations: Abdomen is soft. Musculoskeletal:         General: Normal range of motion. Cervical back: Normal range of motion. Skin:     General: Skin is warm and dry. Neurological:      Mental Status: He is alert and oriented to person, place, and time. Sensory: No sensory deficit. Motor: No weakness or pronator drift. DIAGNOSTIC RESULTS     EKG:(none if blank)  All EKGs are interpreted by the Emergency Department Physician who either signs or Co-signs this chart in the absence of a cardiologist.    Normal sinus rhythm with a ventricular rate of 70. VT interval 134 ms,  ms,  ms and  ms. RADIOLOGY: (none if blank)   I directly visualized the following images and reviewed the radiologist interpretations. Interpretation per the Radiologist below, if available at the time of this note:  XR CHEST PORTABLE   Final Result   No acute findings.       This document has been electronically signed by: Elizabeth Edwards MD on    06/21/2022 04:48 AM          LABS:  Labs Reviewed   CBC WITH AUTO DIFFERENTIAL - Abnormal; Notable for the following components:       Result Value    RBC 3.96 (*)     Hemoglobin 12.2 (*)     Hematocrit 37.7 (*)     MCV 95.2 (*)     Lymphocytes Absolute 0.6 (*)     Monocytes Absolute 0.3 (*)     All other components within normal limits   BASIC METABOLIC PANEL W/ REFLEX TO MG FOR LOW K - Abnormal; Notable for the following components:    CO2 20 (*)     Glucose 123 (*)     All other components within normal limits   GLOMERULAR FILTRATION RATE, ESTIMATED - Abnormal; Notable for the following components:    Est, Glom Filt Rate 67 (*)     All other components within normal limits   COVID-19, RAPID   RAPID INFLUENZA A/B ANTIGENS   HEPATIC FUNCTION PANEL   TROPONIN   LIPASE   BRAIN NATRIURETIC PEPTIDE   ANION GAP   OSMOLALITY       All other labs were within normal range or not returned as of this dictation. Please note, any cultures that may have been sent were not resulted at the time of this patient visit. EMERGENCY DEPARTMENT COURSE and Medical Decision Making:     Vitals:    Vitals:    06/21/22 0332 06/21/22 0430 06/21/22 0530   BP: (!) 155/75 (!) 152/69 (!) 144/71   Pulse:  64 64   Resp: 20 26 20   Temp: 100 °F (37.8 °C)     TempSrc: Oral     SpO2: 97% 98% 96%   Weight: 165 lb (74.8 kg)     Height: 6' (1.829 m)         PROCEDURES: (None if blank)  Procedures    ED Course as of 06/21/22 0633   Tue Jun 21, 2022   0615 Patient states that he is feeling better after the Tylenol. Patient is requesting something to drink. He denies other issues at this time. Plan for discharge. Patient is agreeable with this plan. [NW]   [id number] Reviewed case with  [facility]. Okay for discharge. [NW]      ED Course User Index  [NW] ELLIE Sullivan - CNP     [facility]     Patient that presents to ER with shortness of breath, cough and generalized malaise following getting his COVID-19 vaccination yesterday. Differential diagnosis includes but not limited to COVID-19, influenza, pneumonia, NSTEMI, arrhythmia, dehydration or electrolyte abnormality. Labs, EKG and chest x-ray are all reassuring. Patient had improvement of his symptoms following administration of Tylenol. Patient denies further shortness of breath. Discussed plan for discharge. Patient is agreeable with this plan. Patient instructed to return to ER for worsening symptoms, chest pain, shortness of breath, puslike drainage from wound, inability to keep liquids down, inability to urinate for greater than 8 hours or difficulty breathing. Follow-up with your primary care provider. Strict return precautions and follow up instructions were discussed with the patient with which the patient agrees    ED Medications administered this visit:    Medications   acetaminophen (TYLENOL) tablet 1,000 mg (1,000 mg Oral Given 6/21/22 0528)         FINAL IMPRESSION      1. Fever after COVID-19 vaccination    2.  Shortness of breath          DISPOSITION/PLAN   DISPOSITION Discharge - Pending Orders Complete 06/21/2022 06:17:44 AM      PATIENT REFERRED TO:  Nickolas Allen MD  [place] [id number] [phone]            DISCHARGE MEDICATIONS:  New Prescriptions    No medications on file              ELLIE Sainz CNP (electronically signed)           ELLIE Sainz CNP  06/21/22 Charly [address], APRN - CNP  06/21/22 3411

## 2022-06-21 NOTE — ED NOTES
Pt medicated per MAR. Patient resting in bed. Denies further needs at this time. Call light within reach.        Tiago William RN  06/21/22 [id number]

## 2022-06-21 NOTE — ED NOTES
Patient resting in bed. Respirations easy and unlabored. No distress noted. Call light within reach.        Antonella Mcpherson RN  06/21/22 [id number]

## 2022-06-21 NOTE — ED NOTES
Attempted to call report to assisted living facility; no answer.       Alexander Mcknight RN  06/21/22 1993

## 2022-06-21 NOTE — ED NOTES
ED nurse-to-nurse bedside report    Chief Complaint   Patient presents with    Shortness of Breath    Numbness      LOC: alert and orientated to name, place, date  Vital signs   Vitals:    06/21/22 0332 06/21/22 0430 06/21/22 0530 06/21/22 0630   BP: (!) 155/75 (!) 152/69 (!) 144/71 (!) 140/68   Pulse:  64 64 62   Resp: 20 26 20 22   Temp: 100 °F (37.8 °C)      TempSrc: Oral      SpO2: 97% 98% 96% 97%   Weight: 165 lb (74.8 kg)      Height: 6' (1.829 m)         Pain:    Pain Interventions: rest/reposition  Pain Goal: 0  Oxygen: No    Current needs required 0   Telemetry: Yes  LDAs:   Peripheral IV 06/21/22 Right Antecubital (Active)   Site Assessment Clean, dry & intact 06/21/22 0400   Line Status Normal saline locked 06/21/22 0400   Dressing Status New dressing applied 06/21/22 0400   Dressing Type Transparent 06/21/22 0400   Dressing Intervention New 06/21/22 0400     Continuous Infusions:   Mobility: Requires assistance * 1  Jang Fall Risk Score: No flowsheet data found.   Fall Interventions: siderails x2, call light within reach  Report given to: King Junior RN  06/21/22 3584

## 2022-06-21 NOTE — ED TRIAGE NOTES
Pt presents to ED via EMS with chief complaint of shortness of breath. Pt states he got his COVID booster yesterday and thinks he is having a reaction; states he has had reactions in the past to COVID boosters, and this is similar, but worse. Reports \"tingling\" in his left arm where he received the vaccine. Also reports headache and shoulder pain. Oral temp 100.

## 2022-06-21 NOTE — ED NOTES
Patient resting in bed. Respirations easy and unlabored. No distress noted. Call light within reach.        Antonella Mcpherson RN  06/21/22 3594

## 2022-09-06 ENCOUNTER — HOSPITAL ENCOUNTER (OUTPATIENT)
Age: 62
Discharge: HOME | End: 2022-09-06
Payer: MEDICAID

## 2022-09-06 ENCOUNTER — HOSPITAL ENCOUNTER (OUTPATIENT)
Dept: GENERAL RADIOLOGY | Age: 62
Discharge: HOME | End: 2022-09-06
Payer: MEDICAID

## 2022-09-06 DIAGNOSIS — R52 PAIN: ICD-10-CM

## 2022-09-06 PROCEDURE — 73030 X-RAY EXAM OF SHOULDER: CPT

## 2022-10-26 ENCOUNTER — HOSPITAL ENCOUNTER (OUTPATIENT)
Dept: MRI IMAGING | Age: 62
Discharge: HOME | End: 2022-10-26
Payer: MEDICAID

## 2022-10-26 DIAGNOSIS — M50.20 DISPLACEMENT OF CERVICAL INTERVERTEBRAL DISC WITHOUT MYELOPATHY: ICD-10-CM

## 2022-10-26 PROCEDURE — 72141 MRI NECK SPINE W/O DYE: CPT

## 2023-01-05 ENCOUNTER — OFFICE VISIT (OUTPATIENT)
Dept: CARDIOLOGY CLINIC | Age: 63
End: 2023-01-05
Payer: MEDICAID

## 2023-01-05 VITALS
WEIGHT: 179 LBS | HEIGHT: 72 IN | DIASTOLIC BLOOD PRESSURE: 81 MMHG | SYSTOLIC BLOOD PRESSURE: 126 MMHG | HEART RATE: 55 BPM | RESPIRATION RATE: 18 BRPM | BODY MASS INDEX: 24.24 KG/M2

## 2023-01-05 DIAGNOSIS — I10 HYPERTENSION, UNSPECIFIED TYPE: Primary | ICD-10-CM

## 2023-01-05 DIAGNOSIS — E78.5 DYSLIPIDEMIA: ICD-10-CM

## 2023-01-05 PROCEDURE — 93000 ELECTROCARDIOGRAM COMPLETE: CPT | Performed by: INTERNAL MEDICINE

## 2023-01-05 PROCEDURE — 3079F DIAST BP 80-89 MM HG: CPT | Performed by: INTERNAL MEDICINE

## 2023-01-05 PROCEDURE — 3074F SYST BP LT 130 MM HG: CPT | Performed by: INTERNAL MEDICINE

## 2023-01-05 PROCEDURE — 99213 OFFICE O/P EST LOW 20 MIN: CPT | Performed by: INTERNAL MEDICINE

## 2023-01-05 PROCEDURE — 1123F ACP DISCUSS/DSCN MKR DOCD: CPT | Performed by: INTERNAL MEDICINE

## 2023-01-05 ASSESSMENT — ENCOUNTER SYMPTOMS
ABDOMINAL PAIN: 0
CHOKING: 0
VOMITING: 0
VOICE CHANGE: 0
CHEST TIGHTNESS: 0
SHORTNESS OF BREATH: 0
NAUSEA: 0
WHEEZING: 0
BLOOD IN STOOL: 0
APNEA: 0
COLOR CHANGE: 0
ABDOMINAL DISTENTION: 0
STRIDOR: 0
COUGH: 0
TROUBLE SWALLOWING: 0
ANAL BLEEDING: 0

## 2023-01-05 NOTE — PROGRESS NOTES
[address]  Dept: [address]: [phone]     1/5/2023       Rudy Aguiar is here today for   Chief Complaint   Patient presents with    Follow-up           Referring Physician:  No ref. provider found     Patient Active Problem List   Diagnosis    Pituitary adenoma [facility])    Hypogonadism male    History of seizure disorder    Cognitive impairment    Hyperlipidemia with target LDL less than 100    CAD (coronary artery disease)    Psychiatric disorder    Central hypothyroidism    Aggressive behavior    HTN (hypertension)    GERD (gastroesophageal reflux disease)    Seizure disorder (HCC)    Dizziness    Chest pain at rest    Chest pain at rest    CKD (chronic kidney disease) stage 3, GFR 30-59 ml/min (HCC)    Hypotension    Benign non-nodular prostatic hyperplasia with lower urinary tract symptoms       Review of Systems   Constitutional:  Negative for activity change, appetite change, fatigue, fever and unexpected weight change. HENT:  Negative for congestion, trouble swallowing and voice change. Eyes:  Negative for visual disturbance. Respiratory:  Negative for apnea, cough, choking, chest tightness, shortness of breath, wheezing and stridor. Cardiovascular:  Negative for chest pain, palpitations and leg swelling. Gastrointestinal:  Negative for abdominal distention, abdominal pain, anal bleeding, blood in stool, nausea and vomiting. Endocrine: Negative for cold intolerance and heat intolerance. Genitourinary:  Negative for hematuria. Musculoskeletal:  Negative for arthralgias, gait problem, joint swelling and myalgias. Skin:  Negative for color change and rash. Allergic/Immunologic: Negative for environmental allergies and food allergies. Neurological:  Negative for dizziness, tremors, syncope, facial asymmetry, weakness, light-headedness, numbness and headaches. Hematological:  Does not bruise/bleed easily. Psychiatric/Behavioral:  Negative for agitation, behavioral problems and sleep disturbance. Past Medical History:   Diagnosis Date    Angina at rest [facility])     intermittent    Anxiety     Anxiety     Arthritis     Asperger's disorder     Asthma     CAD (coronary artery disease)     Cervical radiculopathy     Chest pain     CHF (congestive heart failure) ([facility])     COPD (chronic obstructive pulmonary disease) ([facility])     Depression     Depression     Gall stones     GERD (gastroesophageal reflux disease)     Hyperlipidemia     Hypertension     Mentally challenged     Seizures ([facility])     Thyroid disease     Tremor        Allergies   Allergen Reactions    Morphine Shortness Of Breath     convulsions    Caffeine      Nervous and shakes all over    Percodan [Oxycodone-Aspirin]     Tape Ricke Guard Tape] Dermatitis    Zyban [Bupropion Hcl]     Dicyclomine Hcl Nausea And Vomiting    Oxycodone Nausea And Vomiting       Current Outpatient Medications   Medication Sig Dispense Refill    QUEtiapine Fumarate (SEROQUEL PO) Take 500 mg by mouth daily      UNABLE TO FIND Tussin      metoprolol tartrate (LOPRESSOR) 25 MG tablet Take 0.5 tablets by mouth 2 times daily 60 tablet 3    promethazine (PHENERGAN) 25 MG tablet Take 25 mg by mouth every 6 hours as needed for Nausea      LORazepam (ATIVAN) 1 MG tablet Take 1 mg by mouth daily.  1/2-1 daily      guaiFENesin (MUCINEX) 600 MG extended release tablet Take 600 mg by mouth 2 times daily       finasteride (PROSCAR) 5 MG tablet Take 5 mg by mouth daily      doxepin (SINEQUAN) 25 MG capsule Take 25 mg by mouth nightly      solifenacin (VESICARE) 10 MG tablet Take 10 mg by mouth daily       docusate sodium (COLACE) 100 MG capsule Take 100 mg by mouth 2 times daily      hydrOXYzine (ATARAX) 25 MG tablet Take 25 mg by mouth 4 times daily as needed for Itching      loperamide (IMODIUM) 2 MG capsule Take 2 mg by mouth as needed for Diarrhea Albuterol Sulfate (PROAIR HFA IN) Inhale 90 mcg into the lungs every 4 hours as needed      traMADol (ULTRAM) 50 MG tablet Take 50 mg by mouth every 8 hours as needed for Pain. calcium carbonate (TUMS) 500 MG chewable tablet Take 1 tablet by mouth as needed for Heartburn      acetaminophen (TYLENOL) 500 MG tablet Take 500 mg by mouth every 4 hours as needed for Pain      HYDROcodone-acetaminophen (NORCO) 5-325 MG per tablet Take 1 tablet by mouth every 6 hours as needed for Pain . aspirin 81 MG tablet Take 81 mg by mouth daily      clopidogrel (PLAVIX) 75 MG tablet Take 75 mg by mouth daily      SYNTHROID 88 MCG tablet Take one tablet 7 days/week with extra 1/2 tablet one day/week (Patient taking differently: Take one tablet 7 days/week with extra 1/2 tablet one day/week on Wednesday) 30 tablet 5    ipratropium-albuterol (DUONEB) 0.5-2.5 (3) MG/3ML SOLN nebulizer solution Inhale 3 mLs into the lungs every 4 hours as needed for Shortness of Breath 1 vial 0    lactobacillus acidophilus (FLORANEX) Take 1 tablet by mouth 3 times daily      ARTIFICIAL TEAR OP Apply to eye 1 drop in each eye 2-4 times per day      tamsulosin (FLOMAX) 0.4 MG capsule Take 1 capsule by mouth 2 times daily 2 tablets at bedtime (Patient taking differently: Take 0.4 mg by mouth daily 2 tablets at bedtime) 60 capsule 11    cyclobenzaprine (FLEXERIL) 10 MG tablet Take 10 mg by mouth 2 times daily as needed for Muscle spasms      zolpidem (AMBIEN CR) 12.5 MG extended release tablet Take 10 mg by mouth nightly as needed for Sleep. topiramate (TOPAMAX) 100 MG tablet Take 100 mg by mouth 2 times daily      ondansetron (ZOFRAN) 4 MG tablet   Take 8 mg by mouth every 12 hours as needed       pravastatin (PRAVACHOL) 40 MG tablet   Take 40 mg by mouth nightly       vitamin D (CHOLECALCIFEROL) 1000 UNIT TABS tablet Take 1 tablet by mouth daily. 30 tablet 1    citalopram (CELEXA) 40 MG tablet Take 1 tablet by mouth daily.  30 tablet 1 nitroGLYCERIN (NITROSTAT) 0.4 MG SL tablet Place 1 tablet under the tongue every 5 minutes as needed for Chest pain. 25 tablet 1    polyethylene glycol (GLYCOLAX) packet Take 17 g by mouth 2 times daily. 1200 g 11    pantoprazole (PROTONIX) 40 MG tablet   Take 40 mg by mouth 2 times daily       sucralfate (CARAFATE) 1 GM/10ML suspension   Take 1 g by mouth 4 times daily (before meals and nightly)       Montelukast Sodium (SINGULAIR PO)   Take 10 mg by mouth daily       benzonatate (TESSALON) 100 MG capsule Take 100 mg by mouth daily. loratadine (CLARITIN) 10 MG tablet Take 10 mg by mouth daily. No current facility-administered medications for this visit. Social History     Socioeconomic History    Marital status: Single     Spouse name: None    Number of children: None    Years of education: None    Highest education level: None   Tobacco Use    Smoking status: Never    Smokeless tobacco: Never   Vaping Use    Vaping Use: Never used   Substance and Sexual Activity    Alcohol use: No     Alcohol/week: 0.0 standard drinks    Drug use: No       Family History   Problem Relation Age of Onset    Cancer Mother     Diabetes Mother     Heart Disease Mother     High Blood Pressure Mother     High Cholesterol Mother     Arthritis Father     Heart Disease Father     Cancer Father     Diabetes Father     Substance Abuse Father     Depression Father     Obesity Brother     Asthma Brother     Mental Retardation Brother     High Blood Pressure Sister     High Cholesterol Sister        Blood pressure 126/81, pulse 55, resp. rate 18, height 6' (1.829 m), weight 179 lb (81.2 kg).     Physical Exam:    General Appearance: alert and oriented to person, place and time, in no acute distress  Cardiovascular: normal rate, regular rhythm, normal S1 and S2, no murmurs, rubs, clicks, or gallops, distal pulses intact, no carotid bruits, no JVD  Pulmonary/Chest: clear to auscultation bilaterally- no wheezes, rales or rhonchi, normal air movement, no respiratory distress  Abdomen: soft, non-tender, non-distended, normal bowel sounds, no masses   Extremities: no cyanosis, clubbing or edema, pulse   Skin: warm and dry, no rash or erythema  Head: normocephalic and atraumatic  Eyes: pupils equal, round, and reactive to light  Neck: supple and non-tender without mass, no thyromegaly   Musculoskeletal: normal range of motion, no joint swelling, deformity or tenderness  Neurological: alert, oriented, normal speech, no focal findings or movement disorder noted    Lab Data:    Cardiac Enzymes:  No results for input(s): CKTOTAL, CKMB, CKMBINDEX, TROPONINI in the last 72 hours.     CBC:   Lab Results   Component Value Date/Time    WBC 6.1 06/21/2022 03:35 AM    RBC 3.96 06/21/2022 03:35 AM    RBC 4.35 11/16/2011 06:20 AM    HGB 12.2 06/21/2022 03:35 AM    HCT 37.7 06/21/2022 03:35 AM     06/21/2022 03:35 AM       CMP:    Lab Results   Component Value Date/Time     06/21/2022 03:35 AM    K 4.2 06/21/2022 03:35 AM     06/21/2022 03:35 AM    CO2 20 06/21/2022 03:35 AM    BUN 22 06/21/2022 03:35 AM    CREATININE 1.1 06/21/2022 03:35 AM    LABGLOM 67 06/21/2022 03:35 AM    GLUCOSE 123 06/21/2022 03:35 AM    GLUCOSE 130 04/27/2021 09:50 AM    CALCIUM 8.8 06/21/2022 03:35 AM       Hepatic Function Panel:    Lab Results   Component Value Date/Time    ALKPHOS 74 06/21/2022 03:35 AM    ALT 11 06/21/2022 03:35 AM    AST 16 06/21/2022 03:35 AM    PROT 6.7 06/21/2022 03:35 AM    BILITOT 0.3 06/21/2022 03:35 AM    BILIDIR <0.2 06/21/2022 03:35 AM    LABALBU 4.1 06/21/2022 03:35 AM    LABALBU 4.1 11/16/2011 06:20 AM       Magnesium:    Lab Results   Component Value Date/Time    MG 2.2 11/25/2021 09:23 AM       PT/INR:    Lab Results   Component Value Date/Time    PROTIME 1.11 09/07/2011 07:11 AM    INR 1.00 05/29/2014 10:25 AM       HgBA1c:  No results found for: LABA1C    FLP:    Lab Results   Component Value Date/Time    TRIG 81 04/27/2021 09:50 AM    HDL 48 04/27/2021 09:50 AM    LDLCALC 67 02/07/2020 08:11 AM    LDLDIRECT 79 04/27/2021 09:50 AM       TSH:    Lab Results   Component Value Date/Time    TSH 0.626 04/06/2022 02:00 PM        Diagnosis Orders   1. Hypertension, unspecified type  99821 - HI ELECTROCARDIOGRAM, COMPLETE      2. Dyslipidemia  CBC    Basic Metabolic Panel    Lipid Panel    Hepatic Function Panel           Assessment/Plan    Griffin Lawson is a 70years old gentleman history of coronary artery disease patient is here for a follow-up he is in assisting living nursing home because of mental issues he denied chest pain he has been physically active. His lab work was reviewed with him and with his family his EKG reviewed the patient will continue current medication he will be seen annually medication were reconciled sent to his pharmacy and he is to seek medical attention with any change clinical condition thank    Orders Placed This Encounter   Procedures    CBC     Standing Status:   Future     Standing Expiration Date:   6/5/3336    Basic Metabolic Panel     Standing Status:   Future     Standing Expiration Date:   1/5/2024    Lipid Panel     Standing Status:   Future     Standing Expiration Date:   1/5/2024     Order Specific Question:   Is Patient Fasting?/# of Hours     Answer:   12 hours    Hepatic Function Panel     Standing Status:   Future     Standing Expiration Date:   1/5/2024    39252 - HI ELECTROCARDIOGRAM, COMPLETE       Return in about 1 year (around 1/5/2024) for cad.      Paramjit Ahumada MD

## 2023-01-24 NOTE — ED TRIAGE NOTES
Pt presents with complaints of 2 pieces if metal on the axle of his  turned and caught his right thumb and smashed it. Pt has a small abrasion on his thumb not bleeding as well. Pt can move his thumb. Pt alert. Calm/Appropriate

## 2023-12-28 NOTE — ED NOTES
Lab into re-draw troponin.      Martinez Saeed RN  11/25/21 1120 SCDs Capillary refill less/equal to 2 seconds

## 2024-01-25 ENCOUNTER — APPOINTMENT (OUTPATIENT)
Dept: GENERAL RADIOLOGY | Age: 64
DRG: 390 | End: 2024-01-25
Payer: MEDICARE

## 2024-01-25 ENCOUNTER — HOSPITAL ENCOUNTER (INPATIENT)
Age: 64
LOS: 3 days | Discharge: HOME | DRG: 390 | End: 2024-01-29
Attending: EMERGENCY MEDICINE | Admitting: INTERNAL MEDICINE
Payer: MEDICARE

## 2024-01-25 ENCOUNTER — APPOINTMENT (OUTPATIENT)
Dept: CT IMAGING | Age: 64
DRG: 390 | End: 2024-01-25
Payer: MEDICARE

## 2024-01-25 DIAGNOSIS — K56.600 PARTIAL SMALL BOWEL OBSTRUCTION (HCC): Primary | ICD-10-CM

## 2024-01-25 PROBLEM — R10.9 ABDOMINAL PAIN: Status: ACTIVE | Noted: 2024-01-25

## 2024-01-25 LAB
ALBUMIN SERPL BCG-MCNC: 4 G/DL (ref 3.5–5.1)
ALP SERPL-CCNC: 98 U/L (ref 38–126)
ALT SERPL W/O P-5'-P-CCNC: 16 U/L (ref 11–66)
ANION GAP SERPL CALC-SCNC: 14 MEQ/L (ref 8–16)
AST SERPL-CCNC: 18 U/L (ref 5–40)
BASE EXCESS BLDA CALC-SCNC: -6.2 MMOL/L (ref -2–3)
BASOPHILS ABSOLUTE: 0 THOU/MM3 (ref 0–0.1)
BASOPHILS NFR BLD AUTO: 0.2 %
BILIRUB CONJ SERPL-MCNC: < 0.2 MG/DL (ref 0–0.3)
BILIRUB SERPL-MCNC: 0.2 MG/DL (ref 0.3–1.2)
BUN SERPL-MCNC: 26 MG/DL (ref 7–22)
CALCIUM SERPL-MCNC: 8.4 MG/DL (ref 8.5–10.5)
CHLORIDE SERPL-SCNC: 108 MEQ/L (ref 98–111)
CO2 SERPL-SCNC: 18 MEQ/L (ref 23–33)
COLLECTED BY:: ABNORMAL
CREAT SERPL-MCNC: 1.1 MG/DL (ref 0.4–1.2)
DEPRECATED RDW RBC AUTO: 47 FL (ref 35–45)
DEVICE: ABNORMAL
EOSINOPHIL NFR BLD AUTO: 0.1 %
EOSINOPHILS ABSOLUTE: 0 THOU/MM3 (ref 0–0.4)
ERYTHROCYTE [DISTWIDTH] IN BLOOD BY AUTOMATED COUNT: 12.8 % (ref 11.5–14.5)
FLUAV RNA RESP QL NAA+PROBE: NOT DETECTED
FLUBV RNA RESP QL NAA+PROBE: NOT DETECTED
GFR SERPL CREATININE-BSD FRML MDRD: > 60 ML/MIN/1.73M2
GLUCOSE SERPL-MCNC: 157 MG/DL (ref 70–108)
HCO3 BLDA-SCNC: 21 MMOL/L (ref 23–28)
HCT VFR BLD AUTO: 42.8 % (ref 42–52)
HGB BLD-MCNC: 13.2 GM/DL (ref 14–18)
IMM GRANULOCYTES # BLD AUTO: 0.04 THOU/MM3 (ref 0–0.07)
IMM GRANULOCYTES NFR BLD AUTO: 0.4 %
LACTATE SERPL-SCNC: 1.5 MMOL/L (ref 0.5–2)
LIPASE SERPL-CCNC: 16.2 U/L (ref 5.6–51.3)
LYMPHOCYTES ABSOLUTE: 0.3 THOU/MM3 (ref 1–4.8)
LYMPHOCYTES NFR BLD AUTO: 3.7 %
MAGNESIUM SERPL-MCNC: 2 MG/DL (ref 1.6–2.4)
MCH RBC QN AUTO: 30.6 PG (ref 26–33)
MCHC RBC AUTO-ENTMCNC: 30.8 GM/DL (ref 32.2–35.5)
MCV RBC AUTO: 99.3 FL (ref 80–94)
MONOCYTES ABSOLUTE: 0.2 THOU/MM3 (ref 0.4–1.3)
MONOCYTES NFR BLD AUTO: 2.7 %
NEUTROPHILS NFR BLD AUTO: 92.9 %
NRBC BLD AUTO-RTO: 0 /100 WBC
OSMOLALITY SERPL CALC.SUM OF ELEC: 287.4 MOSMOL/KG (ref 275–300)
PCO2 TEMP ADJ BLDMV: 48 MMHG (ref 41–51)
PH BLDMV: 7.25 [PH] (ref 7.31–7.41)
PLATELET # BLD AUTO: 217 THOU/MM3 (ref 130–400)
PMV BLD AUTO: 10.1 FL (ref 9.4–12.4)
PO2 BLDMV: 23 MMHG (ref 25–40)
POTASSIUM SERPL-SCNC: 4 MEQ/L (ref 3.5–5.2)
PROT SERPL-MCNC: 7.1 G/DL (ref 6.1–8)
RBC # BLD AUTO: 4.31 MILL/MM3 (ref 4.7–6.1)
SAO2 % BLDMV: 32 %
SARS-COV-2 RNA RESP QL NAA+PROBE: NOT DETECTED
SEGMENTED NEUTROPHILS ABSOLUTE COUNT: 8.3 THOU/MM3 (ref 1.8–7.7)
SODIUM SERPL-SCNC: 140 MEQ/L (ref 135–145)
TROPONIN, HIGH SENSITIVITY: 12 NG/L (ref 0–12)
TSH SERPL DL<=0.005 MIU/L-ACNC: 1.82 UIU/ML (ref 0.4–4.2)
WBC # BLD AUTO: 8.9 THOU/MM3 (ref 4.8–10.8)

## 2024-01-25 PROCEDURE — 96374 THER/PROPH/DIAG INJ IV PUSH: CPT

## 2024-01-25 PROCEDURE — 99223 1ST HOSP IP/OBS HIGH 75: CPT

## 2024-01-25 PROCEDURE — 99222 1ST HOSP IP/OBS MODERATE 55: CPT | Performed by: SURGERY

## 2024-01-25 PROCEDURE — 96361 HYDRATE IV INFUSION ADD-ON: CPT

## 2024-01-25 PROCEDURE — 6370000000 HC RX 637 (ALT 250 FOR IP)

## 2024-01-25 PROCEDURE — 99285 EMERGENCY DEPT VISIT HI MDM: CPT

## 2024-01-25 PROCEDURE — 83690 ASSAY OF LIPASE: CPT

## 2024-01-25 PROCEDURE — 71045 X-RAY EXAM CHEST 1 VIEW: CPT

## 2024-01-25 PROCEDURE — G0378 HOSPITAL OBSERVATION PER HR: HCPCS

## 2024-01-25 PROCEDURE — 74177 CT ABD & PELVIS W/CONTRAST: CPT

## 2024-01-25 PROCEDURE — 82803 BLOOD GASES ANY COMBINATION: CPT

## 2024-01-25 PROCEDURE — 93005 ELECTROCARDIOGRAM TRACING: CPT

## 2024-01-25 PROCEDURE — 85025 COMPLETE CBC W/AUTO DIFF WBC: CPT

## 2024-01-25 PROCEDURE — 82248 BILIRUBIN DIRECT: CPT

## 2024-01-25 PROCEDURE — 2580000003 HC RX 258

## 2024-01-25 PROCEDURE — 6360000002 HC RX W HCPCS: Performed by: EMERGENCY MEDICINE

## 2024-01-25 PROCEDURE — 2500000003 HC RX 250 WO HCPCS: Performed by: EMERGENCY MEDICINE

## 2024-01-25 PROCEDURE — 93005 ELECTROCARDIOGRAM TRACING: CPT | Performed by: PHYSICIAN ASSISTANT

## 2024-01-25 PROCEDURE — 84443 ASSAY THYROID STIM HORMONE: CPT

## 2024-01-25 PROCEDURE — 84484 ASSAY OF TROPONIN QUANT: CPT

## 2024-01-25 PROCEDURE — 87636 SARSCOV2 & INF A&B AMP PRB: CPT

## 2024-01-25 PROCEDURE — 83735 ASSAY OF MAGNESIUM: CPT

## 2024-01-25 PROCEDURE — 83605 ASSAY OF LACTIC ACID: CPT

## 2024-01-25 PROCEDURE — G0378 HOSPITAL OBSERVATION PER HR: HCPCS | Performed by: NURSE PRACTITIONER

## 2024-01-25 PROCEDURE — 6360000002 HC RX W HCPCS

## 2024-01-25 PROCEDURE — 6360000004 HC RX CONTRAST MEDICATION: Performed by: EMERGENCY MEDICINE

## 2024-01-25 PROCEDURE — 80053 COMPREHEN METABOLIC PANEL: CPT

## 2024-01-25 PROCEDURE — 36415 COLL VENOUS BLD VENIPUNCTURE: CPT

## 2024-01-25 PROCEDURE — 96376 TX/PRO/DX INJ SAME DRUG ADON: CPT

## 2024-01-25 RX ORDER — DEXTROSE MONOHYDRATE, SODIUM CHLORIDE, AND POTASSIUM CHLORIDE 50; 1.49; 4.5 G/1000ML; G/1000ML; G/1000ML
INJECTION, SOLUTION INTRAVENOUS CONTINUOUS
Status: DISCONTINUED | OUTPATIENT
Start: 2024-01-25 | End: 2024-01-25

## 2024-01-25 RX ORDER — ASPIRIN 81 MG/1
81 TABLET ORAL DAILY
Status: DISCONTINUED | OUTPATIENT
Start: 2024-01-25 | End: 2024-01-29 | Stop reason: HOSPADM

## 2024-01-25 RX ORDER — SODIUM CHLORIDE 9 MG/ML
INJECTION, SOLUTION INTRAVENOUS CONTINUOUS
Status: DISCONTINUED | OUTPATIENT
Start: 2024-01-25 | End: 2024-01-25

## 2024-01-25 RX ORDER — SODIUM CHLORIDE 0.9 % (FLUSH) 0.9 %
10 SYRINGE (ML) INJECTION EVERY 12 HOURS SCHEDULED
Status: DISCONTINUED | OUTPATIENT
Start: 2024-01-25 | End: 2024-01-29 | Stop reason: HOSPADM

## 2024-01-25 RX ORDER — DOXEPIN HYDROCHLORIDE 25 MG/1
25 CAPSULE ORAL NIGHTLY
Status: DISCONTINUED | OUTPATIENT
Start: 2024-01-25 | End: 2024-01-29 | Stop reason: HOSPADM

## 2024-01-25 RX ORDER — FINASTERIDE 5 MG/1
5 TABLET, FILM COATED ORAL DAILY
Status: DISCONTINUED | OUTPATIENT
Start: 2024-01-25 | End: 2024-01-29 | Stop reason: HOSPADM

## 2024-01-25 RX ORDER — ALBUTEROL SULFATE 90 UG/1
1 AEROSOL, METERED RESPIRATORY (INHALATION) EVERY 4 HOURS PRN
Status: DISCONTINUED | OUTPATIENT
Start: 2024-01-25 | End: 2024-01-29 | Stop reason: HOSPADM

## 2024-01-25 RX ORDER — POTASSIUM CHLORIDE 7.45 MG/ML
10 INJECTION INTRAVENOUS PRN
Status: DISCONTINUED | OUTPATIENT
Start: 2024-01-25 | End: 2024-01-29 | Stop reason: HOSPADM

## 2024-01-25 RX ORDER — ZOLPIDEM TARTRATE 5 MG/1
5 TABLET ORAL NIGHTLY PRN
Status: DISCONTINUED | OUTPATIENT
Start: 2024-01-25 | End: 2024-01-25

## 2024-01-25 RX ORDER — NITROGLYCERIN 0.4 MG/1
0.4 TABLET SUBLINGUAL EVERY 5 MIN PRN
Status: DISCONTINUED | OUTPATIENT
Start: 2024-01-25 | End: 2024-01-29 | Stop reason: HOSPADM

## 2024-01-25 RX ORDER — LEVOTHYROXINE SODIUM 88 UG/1
88 TABLET ORAL
Status: DISCONTINUED | OUTPATIENT
Start: 2024-01-26 | End: 2024-01-29 | Stop reason: HOSPADM

## 2024-01-25 RX ORDER — POTASSIUM CHLORIDE 20 MEQ/1
40 TABLET, EXTENDED RELEASE ORAL PRN
Status: DISCONTINUED | OUTPATIENT
Start: 2024-01-25 | End: 2024-01-29 | Stop reason: HOSPADM

## 2024-01-25 RX ORDER — ONDANSETRON 4 MG/1
4 TABLET, ORALLY DISINTEGRATING ORAL EVERY 8 HOURS PRN
Status: DISCONTINUED | OUTPATIENT
Start: 2024-01-25 | End: 2024-01-29 | Stop reason: HOSPADM

## 2024-01-25 RX ORDER — CYCLOBENZAPRINE HCL 10 MG
10 TABLET ORAL 2 TIMES DAILY PRN
Status: DISCONTINUED | OUTPATIENT
Start: 2024-01-25 | End: 2024-01-29 | Stop reason: HOSPADM

## 2024-01-25 RX ORDER — TAMSULOSIN HYDROCHLORIDE 0.4 MG/1
0.8 CAPSULE ORAL DAILY
Status: DISCONTINUED | OUTPATIENT
Start: 2024-01-25 | End: 2024-01-29 | Stop reason: HOSPADM

## 2024-01-25 RX ORDER — ACETAMINOPHEN 325 MG/1
650 TABLET ORAL EVERY 6 HOURS PRN
Status: DISCONTINUED | OUTPATIENT
Start: 2024-01-25 | End: 2024-01-29 | Stop reason: HOSPADM

## 2024-01-25 RX ORDER — SUCRALFATE 1 G/1
1 TABLET ORAL
Status: DISCONTINUED | OUTPATIENT
Start: 2024-01-25 | End: 2024-01-29 | Stop reason: HOSPADM

## 2024-01-25 RX ORDER — LEVOTHYROXINE SODIUM 88 UG/1
132 TABLET ORAL WEEKLY
Status: DISCONTINUED | OUTPATIENT
Start: 2024-01-31 | End: 2024-01-29 | Stop reason: HOSPADM

## 2024-01-25 RX ORDER — ACETAMINOPHEN 650 MG/1
650 SUPPOSITORY RECTAL EVERY 6 HOURS PRN
Status: DISCONTINUED | OUTPATIENT
Start: 2024-01-25 | End: 2024-01-29 | Stop reason: HOSPADM

## 2024-01-25 RX ORDER — TOPIRAMATE 100 MG/1
100 TABLET, FILM COATED ORAL 2 TIMES DAILY
Status: DISCONTINUED | OUTPATIENT
Start: 2024-01-25 | End: 2024-01-29 | Stop reason: HOSPADM

## 2024-01-25 RX ORDER — PANTOPRAZOLE SODIUM 40 MG/1
40 TABLET, DELAYED RELEASE ORAL
Status: DISCONTINUED | OUTPATIENT
Start: 2024-01-26 | End: 2024-01-29 | Stop reason: HOSPADM

## 2024-01-25 RX ORDER — SODIUM CHLORIDE 9 MG/ML
INJECTION, SOLUTION INTRAVENOUS PRN
Status: DISCONTINUED | OUTPATIENT
Start: 2024-01-25 | End: 2024-01-29 | Stop reason: HOSPADM

## 2024-01-25 RX ORDER — MONTELUKAST SODIUM 10 MG/1
10 TABLET ORAL NIGHTLY
Status: DISCONTINUED | OUTPATIENT
Start: 2024-01-25 | End: 2024-01-29 | Stop reason: HOSPADM

## 2024-01-25 RX ORDER — PRAVASTATIN SODIUM 40 MG
40 TABLET ORAL NIGHTLY
Status: DISCONTINUED | OUTPATIENT
Start: 2024-01-25 | End: 2024-01-29 | Stop reason: HOSPADM

## 2024-01-25 RX ORDER — SODIUM CHLORIDE 0.9 % (FLUSH) 0.9 %
10 SYRINGE (ML) INJECTION PRN
Status: DISCONTINUED | OUTPATIENT
Start: 2024-01-25 | End: 2024-01-29 | Stop reason: HOSPADM

## 2024-01-25 RX ORDER — SODIUM CHLORIDE, SODIUM LACTATE, POTASSIUM CHLORIDE, CALCIUM CHLORIDE 600; 310; 30; 20 MG/100ML; MG/100ML; MG/100ML; MG/100ML
INJECTION, SOLUTION INTRAVENOUS CONTINUOUS
Status: DISCONTINUED | OUTPATIENT
Start: 2024-01-25 | End: 2024-01-25

## 2024-01-25 RX ORDER — VITAMIN B COMPLEX
1000 TABLET ORAL DAILY
Status: DISCONTINUED | OUTPATIENT
Start: 2024-01-25 | End: 2024-01-29 | Stop reason: HOSPADM

## 2024-01-25 RX ORDER — SODIUM CHLORIDE, SODIUM LACTATE, POTASSIUM CHLORIDE, CALCIUM CHLORIDE 600; 310; 30; 20 MG/100ML; MG/100ML; MG/100ML; MG/100ML
INJECTION, SOLUTION INTRAVENOUS CONTINUOUS
Status: ACTIVE | OUTPATIENT
Start: 2024-01-25 | End: 2024-01-26

## 2024-01-25 RX ORDER — CLOPIDOGREL BISULFATE 75 MG/1
75 TABLET ORAL DAILY
Status: DISCONTINUED | OUTPATIENT
Start: 2024-01-25 | End: 2024-01-29 | Stop reason: HOSPADM

## 2024-01-25 RX ORDER — POLYETHYLENE GLYCOL 3350 17 G/17G
17 POWDER, FOR SOLUTION ORAL DAILY PRN
Status: DISCONTINUED | OUTPATIENT
Start: 2024-01-25 | End: 2024-01-29 | Stop reason: HOSPADM

## 2024-01-25 RX ORDER — DOCUSATE SODIUM 100 MG/1
100 CAPSULE, LIQUID FILLED ORAL 2 TIMES DAILY
Status: DISCONTINUED | OUTPATIENT
Start: 2024-01-25 | End: 2024-01-29 | Stop reason: HOSPADM

## 2024-01-25 RX ORDER — ENOXAPARIN SODIUM 100 MG/ML
40 INJECTION SUBCUTANEOUS DAILY
Status: CANCELLED | OUTPATIENT
Start: 2024-01-25

## 2024-01-25 RX ORDER — CITALOPRAM 40 MG/1
40 TABLET ORAL DAILY
Status: DISCONTINUED | OUTPATIENT
Start: 2024-01-25 | End: 2024-01-29 | Stop reason: HOSPADM

## 2024-01-25 RX ORDER — ONDANSETRON 2 MG/ML
4 INJECTION INTRAMUSCULAR; INTRAVENOUS EVERY 6 HOURS PRN
Status: DISCONTINUED | OUTPATIENT
Start: 2024-01-25 | End: 2024-01-29 | Stop reason: HOSPADM

## 2024-01-25 RX ORDER — POLYETHYLENE GLYCOL 3350 17 G/17G
17 POWDER, FOR SOLUTION ORAL 2 TIMES DAILY
Status: DISCONTINUED | OUTPATIENT
Start: 2024-01-25 | End: 2024-01-29 | Stop reason: HOSPADM

## 2024-01-25 RX ORDER — MAGNESIUM SULFATE IN WATER 40 MG/ML
2000 INJECTION, SOLUTION INTRAVENOUS PRN
Status: DISCONTINUED | OUTPATIENT
Start: 2024-01-25 | End: 2024-01-29 | Stop reason: HOSPADM

## 2024-01-25 RX ORDER — KETOROLAC TROMETHAMINE 30 MG/ML
15 INJECTION, SOLUTION INTRAMUSCULAR; INTRAVENOUS EVERY 6 HOURS PRN
Status: ACTIVE | OUTPATIENT
Start: 2024-01-25 | End: 2024-01-26

## 2024-01-25 RX ORDER — SODIUM CHLORIDE, SODIUM LACTATE, POTASSIUM CHLORIDE, AND CALCIUM CHLORIDE .6; .31; .03; .02 G/100ML; G/100ML; G/100ML; G/100ML
500 INJECTION, SOLUTION INTRAVENOUS ONCE
Status: DISCONTINUED | OUTPATIENT
Start: 2024-01-25 | End: 2024-01-29 | Stop reason: HOSPADM

## 2024-01-25 RX ADMIN — TOPIRAMATE 100 MG: 100 TABLET, FILM COATED ORAL at 21:39

## 2024-01-25 RX ADMIN — CYCLOBENZAPRINE 10 MG: 10 TABLET, FILM COATED ORAL at 21:40

## 2024-01-25 RX ADMIN — HYDROMORPHONE HYDROCHLORIDE 0.5 MG: 1 INJECTION, SOLUTION INTRAMUSCULAR; INTRAVENOUS; SUBCUTANEOUS at 18:05

## 2024-01-25 RX ADMIN — POLYETHYLENE GLYCOL 3350 17 G: 17 POWDER, FOR SOLUTION ORAL at 21:40

## 2024-01-25 RX ADMIN — DOXEPIN HYDROCHLORIDE 25 MG: 25 CAPSULE ORAL at 21:37

## 2024-01-25 RX ADMIN — MONTELUKAST SODIUM 10 MG: 10 TABLET ORAL at 21:38

## 2024-01-25 RX ADMIN — FINASTERIDE 5 MG: 5 TABLET, FILM COATED ORAL at 21:38

## 2024-01-25 RX ADMIN — HYDROMORPHONE HYDROCHLORIDE 0.5 MG: 1 INJECTION, SOLUTION INTRAMUSCULAR; INTRAVENOUS; SUBCUTANEOUS at 10:13

## 2024-01-25 RX ADMIN — IOPAMIDOL 80 ML: 755 INJECTION, SOLUTION INTRAVENOUS at 11:31

## 2024-01-25 RX ADMIN — SODIUM CHLORIDE, POTASSIUM CHLORIDE, SODIUM LACTATE AND CALCIUM CHLORIDE: 600; 310; 30; 20 INJECTION, SOLUTION INTRAVENOUS at 23:50

## 2024-01-25 RX ADMIN — DOCUSATE SODIUM 100 MG: 100 CAPSULE, LIQUID FILLED ORAL at 21:37

## 2024-01-25 RX ADMIN — SUCRALFATE 1 G: 1 TABLET ORAL at 21:38

## 2024-01-25 RX ADMIN — SODIUM CHLORIDE: 9 INJECTION, SOLUTION INTRAVENOUS at 10:09

## 2024-01-25 RX ADMIN — POTASSIUM CHLORIDE, DEXTROSE MONOHYDRATE AND SODIUM CHLORIDE: 150; 5; 450 INJECTION, SOLUTION INTRAVENOUS at 13:33

## 2024-01-25 RX ADMIN — Medication 1000 UNITS: at 21:37

## 2024-01-25 RX ADMIN — TAMSULOSIN HYDROCHLORIDE 0.8 MG: 0.4 CAPSULE ORAL at 21:39

## 2024-01-25 RX ADMIN — QUETIAPINE FUMARATE 500 MG: 300 TABLET, EXTENDED RELEASE ORAL at 21:40

## 2024-01-25 RX ADMIN — SODIUM CHLORIDE, POTASSIUM CHLORIDE, SODIUM LACTATE AND CALCIUM CHLORIDE: 600; 310; 30; 20 INJECTION, SOLUTION INTRAVENOUS at 14:25

## 2024-01-25 RX ADMIN — PRAVASTATIN SODIUM 40 MG: 40 TABLET ORAL at 21:39

## 2024-01-25 RX ADMIN — HYDROMORPHONE HYDROCHLORIDE 0.5 MG: 1 INJECTION, SOLUTION INTRAMUSCULAR; INTRAVENOUS; SUBCUTANEOUS at 14:52

## 2024-01-25 ASSESSMENT — PAIN SCALES - GENERAL
PAINLEVEL_OUTOF10: 7
PAINLEVEL_OUTOF10: 9
PAINLEVEL_OUTOF10: 7
PAINLEVEL_OUTOF10: 7

## 2024-01-25 ASSESSMENT — PAIN DESCRIPTION - ORIENTATION
ORIENTATION: LEFT;LOWER
ORIENTATION: MID
ORIENTATION: LEFT;LOWER

## 2024-01-25 ASSESSMENT — PAIN DESCRIPTION - LOCATION
LOCATION: ABDOMEN
LOCATION: BACK
LOCATION: BACK
LOCATION: ABDOMEN
LOCATION: CHEST
LOCATION: BACK

## 2024-01-25 ASSESSMENT — PAIN DESCRIPTION - DESCRIPTORS
DESCRIPTORS: PRESSURE
DESCRIPTORS: ACHING;DISCOMFORT
DESCRIPTORS: ACHING;DISCOMFORT
DESCRIPTORS: PATIENT UNABLE TO DESCRIBE
DESCRIPTORS: SHARP

## 2024-01-25 ASSESSMENT — PAIN DESCRIPTION - PAIN TYPE
TYPE: ACUTE PAIN

## 2024-01-25 ASSESSMENT — PAIN - FUNCTIONAL ASSESSMENT
PAIN_FUNCTIONAL_ASSESSMENT: 0-10

## 2024-01-25 NOTE — CONSULTS
I have independently performed an evaluation on Luis Enrique . I have reviewed the above documentation completed by the NP, Ruth Ann Ly  Italicized font, if present, represents changes to the note made by me.    Time spent with patient 30minutes.  Time could have been discontiguous.  Time does not include procedures.  Time does include my direct assessment of the patient and coordination of care.  Time represents more than 50% of the time involved with patient care by the surgical/tauma team.     NG inp lace, pain controlled. Patient states feels better with NG tube. Has had similar symptoms in the past. Plan for small bowel folow through in AM. DVT prophylaxis      Electronically signed by Sandy Bashir MD on 1/26/2024 at 8:30 AM    [facility]  General Surgery Consultation - Ruth Ann Ly APRN - CNP  On behalf of Dr. Sandy Bashir    Pt Name: Luis Enrique Valdes  MRN: [id number]  YOB: 1960  Date of evaluation: 1/25/2024  Primary Care Physician: Joseph Rose MD  Patient evaluated at the request of  Gilma GUERRERO  Reason for evaluation: SBO  IMPRESSIONS:   Partial SBO  Abdominal pain LQ  N&V improved since NG tube   Fecal stasis   CAD on plavix   has a past medical history of Angina at rest, Anxiety, Anxiety, Arthritis, Asperger's disorder, Asthma, CAD (coronary artery disease), Cervical radiculopathy, Chest pain, CHF (congestive heart failure) ([facility]), COPD (chronic obstructive pulmonary disease) ([facility]), Depression, Depression, Gall stones, GERD (gastroesophageal reflux disease), Hyperlipidemia, Hypertension, Mentally challenged, Seizures ([facility]), Thyroid disease, and Tremor.  RECOMMENDATIONS:   Conservative management  NPO  Continue NG tube LIWS  Okay for oral meds  Hold Plavix   Analgesia and antiemetics as needed   KUB in am   Discharge planning pending progress  SUBJECTIVE:   History of Chief Complaint:    Luis Enrique is a 64 y.o.male who presents with abdominal pain and nausea and vomiting.  Onset of

## 2024-01-25 NOTE — ED NOTES
Nursing home called and informed this RN that pt did in fact has a syncopal episode while getting up from his bed to the EMS cot. Providers made aware.

## 2024-01-25 NOTE — RT PROTOCOL NOTE
RT Inhaler-Nebulizer Bronchodilator Protocol Note    There is a bronchodilator order in the chart from a provider indicating to follow the RT Bronchodilator Protocol and there is an “Initiate RT Inhaler-Nebulizer Bronchodilator Protocol” order as well (see protocol at bottom of note).    CXR Findings:  XR CHEST PORTABLE    Result Date: 1/25/2024  Left lower lung atelectasis/infiltrate. **This report has been created using voice recognition software. It may contain minor errors which are inherent in voice recognition technology.** Final report electronically signed by Dr. Best Marie on 1/25/2024 1:56 PM      The findings from the last RT Protocol Assessment were as follows:   History Pulmonary Disease: None or smoker <15 pack years  Respiratory Pattern: Regular pattern and RR 12-20 bpm  Breath Sounds: Clear breath sounds  Cough: Strong, spontaneous, non-productive  Indication for Bronchodilator Therapy: None  Bronchodilator Assessment Score: 0    Aerosolized bronchodilator medication orders have been revised according to the RT Inhaler-Nebulizer Bronchodilator Protocol below.    Respiratory Therapist to perform RT Therapy Protocol Assessment initially then follow the protocol.  Repeat RT Therapy Protocol Assessment PRN for score 0-3 or on second treatment, BID, and PRN for scores above 3.    No Indications - adjust the frequency to every 6 hours PRN wheezing or bronchospasm, if no treatments needed after 48 hours then discontinue using Per Protocol order mode.     If indication present, adjust the RT bronchodilator orders based on the Bronchodilator Assessment Score as indicated below.  Use Inhaler orders unless patient has one or more of the following: on home nebulizer, not able to hold breath for 10 seconds, is not alert and oriented, cannot activate and use MDI correctly, or respiratory rate 25 breaths per minute or more, then use the equivalent nebulizer order(s) with same Frequency and PRN reasons based on

## 2024-01-25 NOTE — CARE COORDINATION
1/25/24, 3:05 PM EST      DISCHARGE PLANNING EVALUATION    Luis Enrique Valdes  Admitted: 1/25/2024  Hospital Day: 0    Location: 6-09/009-A Reason for admit: Abdominal pain [R10.9]  Partial small bowel obstruction (HCC) [K56.600]    Past Medical History:   Diagnosis Date    Angina at rest     intermittent    Anxiety     Anxiety     Arthritis     Asperger's disorder     Asthma     CAD (coronary artery disease)     Cervical radiculopathy     Chest pain     CHF (congestive heart failure) (HCC)     COPD (chronic obstructive pulmonary disease) (HCC)     Depression     Depression     Gall stones     GERD (gastroesophageal reflux disease)     Hyperlipidemia     Hypertension     Mentally challenged     Seizures (HCC)     Thyroid disease     Tremor        Procedure: 1/25 CT abd: possible partial small bowel obstruction. Moderate retained fecal material suggestive of fecal stasis.   1/25 cxray: LLL atelectasis/infiltrate  Barriers to Discharge: CO2 18, ABGs abn. LR infusion, antiemetics, pain control. General surgery consult pending. NPO.     PCP: Joseph Rose MD    Readmission Risk Low 0-14, Mod 15-19), High > 20: No data recorded    Advance Directives:      Code Status: Full Code   Patient's Primary Decision Maker is:        Patient Goals/Plan/Treatment Preferences: from Michael BRANHAM SW consulted.     Transportation/Food Security/Housekeeping Addressed: No issues identified.     If patient is discharged prior to next notation, then this note serves as note for discharge by case management.

## 2024-01-25 NOTE — ED NOTES
ED to inpatient nurses report      Chief Complaint:  Chief Complaint   Patient presents with    Nausea & Vomiting     Present to ED from: [facility]    MOA:     LOC: alert and orientated to name, place, date  Mobility: Requires assistance * 1  Oxygen Baseline: Room Air    Current needs required: Room Air     Code Status:   Prior    What abnormal results were found and what did you give/do to treat them?   Any procedures or intervention occur?     Mental Status:  Level of Consciousness: Alert (0)    Psych Assessment:        Vitals:  Patient Vitals for the past 24 hrs:   BP Temp Temp src Pulse Resp SpO2 Weight   01/25/24 1245 (!) 149/69 -- -- 88 18 96 % --   01/25/24 1146 (!) 167/80 98 °F (36.7 °C) Oral 86 18 97 % --   01/25/24 1030 (!) 148/85 -- -- 98 -- 96 % --   01/25/24 1000 132/76 -- -- -- -- 94 % --   01/25/24 0918 139/80 98.1 °F (36.7 °C) Oral 100 18 95 % 90.7 kg (200 lb)        LDAs:   Peripheral IV 01/25/24 Left Antecubital (Active)   Site Assessment Clean, dry & intact 01/25/24 1148   Line Status Infusing;Normal saline locked 01/25/24 0928   Phlebitis Assessment No symptoms 01/25/24 0928   Infiltration Assessment 0 01/25/24 0928   Dressing Status Clean, dry & intact 01/25/24 0928   Dressing Type Transparent 01/25/24 0928   Dressing Intervention New 01/25/24 0928       Ambulatory Status:  No data recorded    Diagnosis:  DISPOSITION Admitted 01/25/2024 12:37:48 PM   Final diagnoses:   Partial small bowel obstruction (HCC)        Consults:  None     Pain Score:  Pain Assessment  Pain Assessment: 0-10  Pain Level: 7  Pain Location: Abdomen  Pain Descriptors: Sharp  Pain Type: Acute pain  Multiple Pain Sites: No    C-SSRS:   Risk of Suicide: No Risk    Sepsis Screening:  Sepsis Risk Score: 1.58    [place] Fall Risk:       Swallow Screening        Preferred Language:   English      ALLERGIES     Morphine, Caffeine, Percodan [oxycodone-aspirin], Tape [adhesive tape], Zyban [bupropion hcl], Dicyclomine hcl, and

## 2024-01-25 NOTE — H&P
the evaluation of nausea vomiting and abdominal pain.  Patient reports he woke up yesterday feeling sick however developed nausea and vomiting in the afternoon into the evening and overnight.  Patient was reportedly sent to ER due to being febrile with Tmax noted at 101F, persistent nausea and vomiting and severe abdominal pain.  Patient reports last night he experienced associated chest pain that radiated to his back and between his shoulder blades however reports this has since resolved.  He is unable to recall how long his chest pain had lasted.  His abdominal pain is primarily located in the upper abdomen, described as pressure and tightness, no radiation of pain.  He reports normal bowel movements up until yesterday- reports having no bowel movement at all yesterday or today.  He denies any other symptoms at this time.  Patient does remark that he has had difficulty urinating x 2 months, difficulty maintaining stream.  He otherwise denies any recent dietary changes, lifestyle changes or medication changes.  He denies chest pain, shortness of breath, lightheadedness or dizziness at this time.  Patient states he is very fatigued due to getting no sleep last night from his symptoms.      Past Medical History:        Diagnosis Date    Angina at rest     intermittent    Anxiety     Anxiety     Arthritis     Asperger's disorder     Asthma     CAD (coronary artery disease)     Cervical radiculopathy     Chest pain     CHF (congestive heart failure) (HCC)     COPD (chronic obstructive pulmonary disease) (HCC)     Depression     Depression     Gall stones     GERD (gastroesophageal reflux disease)     Hyperlipidemia     Hypertension     Mentally challenged     Seizures (HCC)     Thyroid disease     Tremor        Past Surgical History:        Procedure Laterality Date    ABDOMEN SURGERY      CARDIAC SURGERY      Heart Cath    CHOLECYSTECTOMY  2002    CHOLECYSTECTOMY, LAPAROSCOPIC      COLONOSCOPY      COLONOSCOPY

## 2024-01-25 NOTE — ED NOTES
Pt presents to ED via [facility] from [facility] for c/o n/v and feeling warm to touch. EMS reports symptoms started last and continued this morning. Upon initial assessment, pt is A&Ox4, resps easy and unlabored. Pt presents with 20g IV established in the L AC, infusing 0.9% NS with no s/s of infection or infiltration. EMS reports pt received 4mg ODT Zofran prior to their arrival by ECF staff. Pt reports \"It helped for a little while, then it came back.\" Pt c/o back pain at this time, unable to describe, rating 7/10. Pt swabbed for covid/flu and sent to lab. VS as noted. Protocol orders placed. Awaiting provider assessment and orders. Will monitor.

## 2024-01-25 NOTE — ED NOTES
Razia, SN out and states pt is refusing to drink anymore contrast at this time stating \"it burns and feels like a knife is going in my stomach.\" Dr Cox notified who states it's ok and discontinue oral contrast. CT notified, states they will be down at approx [phone] to scan pt.

## 2024-01-25 NOTE — ED NOTES
A+Ox4, resps easy and unlabored. IV site intact, no signs of infiltration. Pt stated pain left sided abdominal pain 7/10. Updated patient on plan of care. Will monitor.

## 2024-01-25 NOTE — ED PROVIDER NOTES
PATIENT NAME: Luis Enrique Valdes  MRN: [id number]  : 1960  ESTEVEZ: 2024    I performed a history and physical examination of the patient and discussed management with the Resident. I reviewed the Resident's note and agree with the documented findings and plan of care. Any areas of disagreement are noted on the chart. I was personally present for the key portions of any procedures and have documented in the chart those procedures where I was not present during the key portions. I have reviewed the emergency nurses triage note and agree with the chief complaint, past medical history, past surgical history, allergies, medications, social and family history as documented unless otherwise noted below.    MEDICAL DEDISION MAKING (MDM)     Luis Enrique Valdes is a 64 y.o. male who presents to Emergency Department with Nausea & Vomiting     He is brought in from assisted living for N/V since last night.   He feels he is dehydrate.   He also has LUQ and LLQ pain.   Last BM was 2 days.   PMH of Asperger syndrome and MRDD. Prior abdominal surgeries include lap cholecystectomy and TURP.   ED workups suggest partial SBO.   Treatment includes: NPO, IVF, Pain control and Hospitalist admission with GS consult as needed.     Vitals:    24 1030 24 1146 24 1245 24 1412   BP: (!) 148/85 (!) 167/80 (!) 149/69 (!) 149/75   Pulse: 98 86 88 88   Resp:  18 18    Temp:  98 °F (36.7 °C)  97.7 °F (36.5 °C)   TempSrc:  Oral  Oral   SpO2: 96% 97% 96% 97%   Weight:         Labs Reviewed   BASIC METABOLIC PANEL - Abnormal; Notable for the following components:       Result Value    CO2 18 (*)     Glucose 157 (*)     BUN 26 (*)     Calcium 8.4 (*)     All other components within normal limits   CBC WITH AUTO DIFFERENTIAL - Abnormal; Notable for the following components:    RBC 4.31 (*)     Hemoglobin 13.2 (*)     MCV 99.3 (*)     MCHC 30.8 (*)     RDW-SD 47.0 (*)     Segs Absolute 8.3 (*)     Lymphocytes Absolute 0.3 (*)     
 Pupils: Pupils are equal, round, and reactive to light.   Cardiovascular:      Rate and Rhythm: Normal rate and regular rhythm.      Pulses: Normal pulses.      Heart sounds: Normal heart sounds.   Pulmonary:      Effort: Pulmonary effort is normal.      Breath sounds: Normal breath sounds.   Chest:      Chest wall: Tenderness present.   Abdominal:      General: There is distension.      Tenderness: There is abdominal tenderness. There is left CVA tenderness and guarding. There is no right CVA tenderness.   Musculoskeletal:         General: Normal range of motion.      Cervical back: Normal range of motion. No rigidity or tenderness.   Skin:     General: Skin is warm.      Capillary Refill: Capillary refill takes 2 to 3 seconds.   Neurological:      Mental Status: He is alert and oriented to person, place, and time. Mental status is at baseline.          EKG interpretation (none if blank):NSR with LVH.   HR:93 MS: 140 QRS: 122 QTC: 489  All EKG results are individually reviewed and interpreted by me.  All EKGs are also interpreted by our Cardiology department, final interpretation may not be available as of the writing of this note.    FORMAL DIAGNOSTIC RESULTS     RADIOLOGY: Interpretation per the Radiologist below, if available at the time of this note (none if blank):  CT ABDOMEN PELVIS W IV CONTRAST Additional Contrast? Oral   Final Result   1. Focally dilated small bowel loops in the right lower quadrant measure up to 4 cm (series 301, image 45) with decompression of more distal small bowel loops which is a nonspecific finding but could indicate partial small bowel obstruction. The    remaining small bowel loops are not dilated. Moderate retained fecal material is seen throughout the colon suggesting fecal stasis. The stomach is mildly distended and contains oral contrast.      2. Small bilateral pleural effusions. Chronic findings are discussed.            **This report has been created using voice

## 2024-01-25 NOTE — ED NOTES
Oral contrast started at this time.    In for hourly rounding. Pt resting on cot in position of comfort. Pt remains A&Ox4, resps easy and unlabored. IV infusing, shows no s/s of infection or infiltration. Pt pain remains unchanged at this time. Monitor remains in place. Updated pt on POC. Will monitor.

## 2024-01-25 NOTE — ED NOTES
In for hourly rounding. Pt resting on cot in position of comfort. Pt remains A&Ox4, resps easy and unlabored. IV infusing, shows no s/s of infection or infiltration. Pt pain remains unchanged at this time.  NG tube placed at this time. Pt tolerated very well. Monitor remains in place. Updated pt on POC and NPO status. Will monitor.

## 2024-01-26 ENCOUNTER — APPOINTMENT (OUTPATIENT)
Dept: GENERAL RADIOLOGY | Age: 64
DRG: 390 | End: 2024-01-26
Payer: MEDICARE

## 2024-01-26 PROBLEM — K56.609 SBO (SMALL BOWEL OBSTRUCTION) (HCC): Status: ACTIVE | Noted: 2024-01-26

## 2024-01-26 PROBLEM — K56.600 PARTIAL SMALL BOWEL OBSTRUCTION (HCC): Status: ACTIVE | Noted: 2024-01-26

## 2024-01-26 LAB
ALBUMIN SERPL BCG-MCNC: 3.3 G/DL (ref 3.5–5.1)
ALP SERPL-CCNC: 93 U/L (ref 38–126)
ALT SERPL W/O P-5'-P-CCNC: 72 U/L (ref 11–66)
ANION GAP SERPL CALC-SCNC: 7 MEQ/L (ref 8–16)
AST SERPL-CCNC: 61 U/L (ref 5–40)
BASOPHILS ABSOLUTE: 0 THOU/MM3 (ref 0–0.1)
BASOPHILS NFR BLD AUTO: 0.5 %
BILIRUB SERPL-MCNC: 0.3 MG/DL (ref 0.3–1.2)
BUN SERPL-MCNC: 21 MG/DL (ref 7–22)
CALCIUM SERPL-MCNC: 8.2 MG/DL (ref 8.5–10.5)
CHLORIDE SERPL-SCNC: 108 MEQ/L (ref 98–111)
CO2 SERPL-SCNC: 21 MEQ/L (ref 23–33)
CREAT SERPL-MCNC: 1.1 MG/DL (ref 0.4–1.2)
DEPRECATED RDW RBC AUTO: 48.2 FL (ref 35–45)
EOSINOPHIL NFR BLD AUTO: 2.3 %
EOSINOPHILS ABSOLUTE: 0.1 THOU/MM3 (ref 0–0.4)
ERYTHROCYTE [DISTWIDTH] IN BLOOD BY AUTOMATED COUNT: 13.1 % (ref 11.5–14.5)
GFR SERPL CREATININE-BSD FRML MDRD: > 60 ML/MIN/1.73M2
GLUCOSE SERPL-MCNC: 105 MG/DL (ref 70–108)
HCT VFR BLD AUTO: 37.4 % (ref 42–52)
HCT VFR BLD AUTO: 38.1 % (ref 42–52)
HGB BLD-MCNC: 11.7 GM/DL (ref 14–18)
HGB BLD-MCNC: 11.8 GM/DL (ref 14–18)
IMM GRANULOCYTES # BLD AUTO: 0.01 THOU/MM3 (ref 0–0.07)
IMM GRANULOCYTES NFR BLD AUTO: 0.2 %
LYMPHOCYTES ABSOLUTE: 1 THOU/MM3 (ref 1–4.8)
LYMPHOCYTES NFR BLD AUTO: 24.1 %
MCH RBC QN AUTO: 30.9 PG (ref 26–33)
MCHC RBC AUTO-ENTMCNC: 30.7 GM/DL (ref 32.2–35.5)
MCV RBC AUTO: 100.5 FL (ref 80–94)
MONOCYTES ABSOLUTE: 0.5 THOU/MM3 (ref 0.4–1.3)
MONOCYTES NFR BLD AUTO: 12.7 %
NEUTROPHILS NFR BLD AUTO: 60.2 %
NRBC BLD AUTO-RTO: 0 /100 WBC
PLATELET # BLD AUTO: 172 THOU/MM3 (ref 130–400)
PLATELET BLD QL SMEAR: ADEQUATE
PMV BLD AUTO: 10.3 FL (ref 9.4–12.4)
POTASSIUM SERPL-SCNC: 4 MEQ/L (ref 3.5–5.2)
PROT SERPL-MCNC: 5.8 G/DL (ref 6.1–8)
RBC # BLD AUTO: 3.79 MILL/MM3 (ref 4.7–6.1)
SCAN OF BLOOD SMEAR: NORMAL
SEGMENTED NEUTROPHILS ABSOLUTE COUNT: 2.6 THOU/MM3 (ref 1.8–7.7)
SODIUM SERPL-SCNC: 136 MEQ/L (ref 135–145)
VARIANT LYMPHS BLD QL SMEAR: ABNORMAL %
WBC # BLD AUTO: 4.3 THOU/MM3 (ref 4.8–10.8)

## 2024-01-26 PROCEDURE — 93010 ELECTROCARDIOGRAM REPORT: CPT | Performed by: INTERNAL MEDICINE

## 2024-01-26 PROCEDURE — 6370000000 HC RX 637 (ALT 250 FOR IP)

## 2024-01-26 PROCEDURE — 2580000003 HC RX 258

## 2024-01-26 PROCEDURE — APPSS30 APP SPLIT SHARED TIME 16-30 MINUTES: Performed by: NURSE PRACTITIONER

## 2024-01-26 PROCEDURE — 74250 X-RAY XM SM INT 1CNTRST STD: CPT

## 2024-01-26 PROCEDURE — 80053 COMPREHEN METABOLIC PANEL: CPT

## 2024-01-26 PROCEDURE — 99232 SBSQ HOSP IP/OBS MODERATE 35: CPT | Performed by: INTERNAL MEDICINE

## 2024-01-26 PROCEDURE — 36415 COLL VENOUS BLD VENIPUNCTURE: CPT

## 2024-01-26 PROCEDURE — 85014 HEMATOCRIT: CPT

## 2024-01-26 PROCEDURE — 96361 HYDRATE IV INFUSION ADD-ON: CPT

## 2024-01-26 PROCEDURE — 85025 COMPLETE CBC W/AUTO DIFF WBC: CPT

## 2024-01-26 PROCEDURE — 6360000004 HC RX CONTRAST MEDICATION: Performed by: NURSE PRACTITIONER

## 2024-01-26 PROCEDURE — 1200000000 HC SEMI PRIVATE

## 2024-01-26 PROCEDURE — 85018 HEMOGLOBIN: CPT

## 2024-01-26 PROCEDURE — 6370000000 HC RX 637 (ALT 250 FOR IP): Performed by: PHYSICIAN ASSISTANT

## 2024-01-26 PROCEDURE — 74018 RADEX ABDOMEN 1 VIEW: CPT

## 2024-01-26 RX ORDER — ENEMA 19; 7 G/133ML; G/133ML
1 ENEMA RECTAL ONCE
Status: DISCONTINUED | OUTPATIENT
Start: 2024-01-26 | End: 2024-01-29 | Stop reason: HOSPADM

## 2024-01-26 RX ADMIN — MONTELUKAST SODIUM 10 MG: 10 TABLET ORAL at 21:00

## 2024-01-26 RX ADMIN — QUETIAPINE FUMARATE 500 MG: 300 TABLET, EXTENDED RELEASE ORAL at 08:44

## 2024-01-26 RX ADMIN — SUCRALFATE 1 G: 1 TABLET ORAL at 21:00

## 2024-01-26 RX ADMIN — DIATRIZOATE MEGLUMINE AND DIATRIZOATE SODIUM 90 ML: 600; 100 SOLUTION ORAL; RECTAL at 16:20

## 2024-01-26 RX ADMIN — PRAVASTATIN SODIUM 40 MG: 40 TABLET ORAL at 21:00

## 2024-01-26 RX ADMIN — TOPIRAMATE 100 MG: 100 TABLET, FILM COATED ORAL at 08:44

## 2024-01-26 RX ADMIN — TOPIRAMATE 100 MG: 100 TABLET, FILM COATED ORAL at 21:00

## 2024-01-26 RX ADMIN — POLYETHYLENE GLYCOL 3350 17 G: 17 POWDER, FOR SOLUTION ORAL at 08:43

## 2024-01-26 RX ADMIN — TAMSULOSIN HYDROCHLORIDE 0.8 MG: 0.4 CAPSULE ORAL at 08:44

## 2024-01-26 RX ADMIN — FINASTERIDE 5 MG: 5 TABLET, FILM COATED ORAL at 08:44

## 2024-01-26 RX ADMIN — CITALOPRAM 40 MG: 40 TABLET, FILM COATED ORAL at 08:44

## 2024-01-26 RX ADMIN — PANTOPRAZOLE SODIUM 40 MG: 40 TABLET, DELAYED RELEASE ORAL at 05:54

## 2024-01-26 RX ADMIN — SUCRALFATE 1 G: 1 TABLET ORAL at 05:53

## 2024-01-26 RX ADMIN — SUCRALFATE 1 G: 1 TABLET ORAL at 18:28

## 2024-01-26 RX ADMIN — Medication 1000 UNITS: at 08:44

## 2024-01-26 RX ADMIN — DOCUSATE SODIUM 100 MG: 100 CAPSULE, LIQUID FILLED ORAL at 08:44

## 2024-01-26 RX ADMIN — DOXEPIN HYDROCHLORIDE 25 MG: 25 CAPSULE ORAL at 21:00

## 2024-01-26 RX ADMIN — LEVOTHYROXINE SODIUM 88 MCG: 0.09 TABLET ORAL at 05:54

## 2024-01-26 RX ADMIN — PANTOPRAZOLE SODIUM 40 MG: 40 TABLET, DELAYED RELEASE ORAL at 18:28

## 2024-01-26 RX ADMIN — NITROGLYCERIN 0.4 MG: 0.4 TABLET, ORALLY DISINTEGRATING SUBLINGUAL at 00:08

## 2024-01-26 RX ADMIN — SUCRALFATE 1 G: 1 TABLET ORAL at 11:17

## 2024-01-26 RX ADMIN — SODIUM CHLORIDE, POTASSIUM CHLORIDE, SODIUM LACTATE AND CALCIUM CHLORIDE: 600; 310; 30; 20 INJECTION, SOLUTION INTRAVENOUS at 13:49

## 2024-01-26 NOTE — CARE COORDINATION
1/26/24, 7:52 AM EST    DISCHARGE ON GOING EVALUATION    [facility] day: 0  Location: 6K-09/009-A Reason for admit: Abdominal pain [R10.9]  Partial small bowel obstruction (HCC) [K56.600]   Procedure:  1/25 CT abd: possible partial small bowel obstruction. Moderate retained fecal material suggestive of fecal stasis.   1/25 cxray: LLL atelectasis/infiltrate  Barriers to Discharge: NG to LIWS. General surgery following. LR infusion, pain control.   PCP: Joseph Rose MD   %  Patient Goals/Plan/Treatment Preferences: from [facility] SW following.

## 2024-01-26 NOTE — CARE COORDINATION
1/26/24, 9:16 AM EST  Discharge Planning Evaluation  Social work consult received, patient from [facility].   Patient/Family preference is to return to [facility]. SW spoke with sister Vidhi and verified return and that someone in the family will transport when Patient is ready.   Would patient be willing to go to a skilled facility if needed: if needed only.  Is there skilled care available at current facility: no.  Barriers to return to current living situation: n/a  Left message with staff at the facility.  Patient bed hold: yes  Anticipated transport plan: family  Patient's Healthcare Decision Maker: Legal Next of Kin  Readmission Risk Low 0-14, Mod 15-19), High > 20: No data recorded  Current PCP: Joseph Rose MD  PCP verified by CM?      Patient Orientation: Alert and Oriented    Patient Cognition: Alert  History Provided by: Patient    Advance Directives:      Code Status: Full Code   Patient's Primary Decision Maker is: Legal Next of Kin       Discharge Planning:    Patient lives with: Other (Comment) Type of Home: Assisted living  Primary Care Giver: Self  Patient Support Systems include: Family Members   Current Financial resources: None  Current community resources: Assisted Living  Current services prior to admission: None            Current DME:              Type of Home Care services:  None    ADLS  Prior functional level: Assistance with the following:, Housework, Shopping  Current functional level: Assistance with the following:, Housework, Shopping    Family can provide assistance at DC: Yes  Would you like Case Management to discuss the discharge plan with any other family members/significant others, and if so, who? No  Plans to Return to Present Housing: Yes  Other Identified Issues/Barriers to RETURNING to current housing: n/a  Potential Assistance needed at discharge: N/A            Potential DME:    Patient expects to discharge to: Assisted living  Plan for transportation

## 2024-01-26 NOTE — PLAN OF CARE

## 2024-01-26 NOTE — PLAN OF CARE
Problem: Discharge Planning  Goal: Discharge to home or other facility with appropriate resources  1/26/2024 0917 by Ama Ortiz, MSW, LSW  Flowsheets (Taken 1/26/2024 0917)  Discharge to home or other facility with appropriate resources: Identify barriers to discharge with patient and caregiver  Note: Return to [facility], see SW notes 1/26/24.

## 2024-01-27 LAB
ALBUMIN SERPL BCG-MCNC: 3.8 G/DL (ref 3.5–5.1)
ALP SERPL-CCNC: 102 U/L (ref 38–126)
ALT SERPL W/O P-5'-P-CCNC: 58 U/L (ref 11–66)
ANION GAP SERPL CALC-SCNC: 13 MEQ/L (ref 8–16)
AST SERPL-CCNC: 38 U/L (ref 5–40)
BASOPHILS ABSOLUTE: 0 THOU/MM3 (ref 0–0.1)
BASOPHILS NFR BLD AUTO: 0.5 %
BILIRUB SERPL-MCNC: 0.3 MG/DL (ref 0.3–1.2)
BUN SERPL-MCNC: 20 MG/DL (ref 7–22)
CALCIUM SERPL-MCNC: 8.7 MG/DL (ref 8.5–10.5)
CHLORIDE SERPL-SCNC: 106 MEQ/L (ref 98–111)
CO2 SERPL-SCNC: 20 MEQ/L (ref 23–33)
CREAT SERPL-MCNC: 1 MG/DL (ref 0.4–1.2)
DEPRECATED RDW RBC AUTO: 43.9 FL (ref 35–45)
EOSINOPHIL NFR BLD AUTO: 1.8 %
EOSINOPHILS ABSOLUTE: 0.1 THOU/MM3 (ref 0–0.4)
ERYTHROCYTE [DISTWIDTH] IN BLOOD BY AUTOMATED COUNT: 12.5 % (ref 11.5–14.5)
GFR SERPL CREATININE-BSD FRML MDRD: > 60 ML/MIN/1.73M2
GLUCOSE BLD STRIP.AUTO-MCNC: 84 MG/DL (ref 70–108)
GLUCOSE SERPL-MCNC: 90 MG/DL (ref 70–108)
HCT VFR BLD AUTO: 39.4 % (ref 42–52)
HGB BLD-MCNC: 12.6 GM/DL (ref 14–18)
IMM GRANULOCYTES # BLD AUTO: 0.02 THOU/MM3 (ref 0–0.07)
IMM GRANULOCYTES NFR BLD AUTO: 0.3 %
LYMPHOCYTES ABSOLUTE: 1.1 THOU/MM3 (ref 1–4.8)
LYMPHOCYTES NFR BLD AUTO: 18.8 %
MCH RBC QN AUTO: 30.8 PG (ref 26–33)
MCHC RBC AUTO-ENTMCNC: 32 GM/DL (ref 32.2–35.5)
MCV RBC AUTO: 96.3 FL (ref 80–94)
MONOCYTES ABSOLUTE: 0.7 THOU/MM3 (ref 0.4–1.3)
MONOCYTES NFR BLD AUTO: 11.5 %
NEUTROPHILS NFR BLD AUTO: 67.1 %
NRBC BLD AUTO-RTO: 0 /100 WBC
PLATELET # BLD AUTO: 189 THOU/MM3 (ref 130–400)
PMV BLD AUTO: 10 FL (ref 9.4–12.4)
POTASSIUM SERPL-SCNC: 3.9 MEQ/L (ref 3.5–5.2)
PROT SERPL-MCNC: 6.6 G/DL (ref 6.1–8)
RBC # BLD AUTO: 4.09 MILL/MM3 (ref 4.7–6.1)
SEGMENTED NEUTROPHILS ABSOLUTE COUNT: 4 THOU/MM3 (ref 1.8–7.7)
SODIUM SERPL-SCNC: 139 MEQ/L (ref 135–145)
WBC # BLD AUTO: 6 THOU/MM3 (ref 4.8–10.8)

## 2024-01-27 PROCEDURE — 1200000000 HC SEMI PRIVATE

## 2024-01-27 PROCEDURE — 2580000003 HC RX 258

## 2024-01-27 PROCEDURE — 82948 REAGENT STRIP/BLOOD GLUCOSE: CPT

## 2024-01-27 PROCEDURE — 36415 COLL VENOUS BLD VENIPUNCTURE: CPT

## 2024-01-27 PROCEDURE — 6370000000 HC RX 637 (ALT 250 FOR IP)

## 2024-01-27 PROCEDURE — 80053 COMPREHEN METABOLIC PANEL: CPT

## 2024-01-27 PROCEDURE — 85025 COMPLETE CBC W/AUTO DIFF WBC: CPT

## 2024-01-27 PROCEDURE — 99232 SBSQ HOSP IP/OBS MODERATE 35: CPT | Performed by: INTERNAL MEDICINE

## 2024-01-27 RX ADMIN — CITALOPRAM 40 MG: 40 TABLET, FILM COATED ORAL at 09:41

## 2024-01-27 RX ADMIN — TOPIRAMATE 100 MG: 100 TABLET, FILM COATED ORAL at 09:40

## 2024-01-27 RX ADMIN — SUCRALFATE 1 G: 1 TABLET ORAL at 05:16

## 2024-01-27 RX ADMIN — PRAVASTATIN SODIUM 40 MG: 40 TABLET ORAL at 20:03

## 2024-01-27 RX ADMIN — MONTELUKAST SODIUM 10 MG: 10 TABLET ORAL at 20:03

## 2024-01-27 RX ADMIN — TAMSULOSIN HYDROCHLORIDE 0.8 MG: 0.4 CAPSULE ORAL at 09:40

## 2024-01-27 RX ADMIN — PANTOPRAZOLE SODIUM 40 MG: 40 TABLET, DELAYED RELEASE ORAL at 16:34

## 2024-01-27 RX ADMIN — FINASTERIDE 5 MG: 5 TABLET, FILM COATED ORAL at 09:41

## 2024-01-27 RX ADMIN — LEVOTHYROXINE SODIUM 88 MCG: 0.09 TABLET ORAL at 05:15

## 2024-01-27 RX ADMIN — SUCRALFATE 1 G: 1 TABLET ORAL at 16:34

## 2024-01-27 RX ADMIN — TOPIRAMATE 100 MG: 100 TABLET, FILM COATED ORAL at 20:03

## 2024-01-27 RX ADMIN — SUCRALFATE 1 G: 1 TABLET ORAL at 09:40

## 2024-01-27 RX ADMIN — QUETIAPINE FUMARATE 500 MG: 300 TABLET, EXTENDED RELEASE ORAL at 09:41

## 2024-01-27 RX ADMIN — DOCUSATE SODIUM 100 MG: 100 CAPSULE, LIQUID FILLED ORAL at 09:41

## 2024-01-27 RX ADMIN — SODIUM CHLORIDE, PRESERVATIVE FREE 10 ML: 5 INJECTION INTRAVENOUS at 09:40

## 2024-01-27 RX ADMIN — DOXEPIN HYDROCHLORIDE 25 MG: 25 CAPSULE ORAL at 20:03

## 2024-01-27 RX ADMIN — POLYETHYLENE GLYCOL 3350 17 G: 17 POWDER, FOR SOLUTION ORAL at 09:41

## 2024-01-27 RX ADMIN — Medication 1000 UNITS: at 09:43

## 2024-01-27 RX ADMIN — PANTOPRAZOLE SODIUM 40 MG: 40 TABLET, DELAYED RELEASE ORAL at 05:15

## 2024-01-27 NOTE — PLAN OF CARE
Problem: Discharge Planning  Goal: Discharge to home or other facility with appropriate resources  Outcome: Progressing  Flowsheets (Taken 1/26/2024 0917 by Ama Ortiz, MSW, LSW)  Discharge to home or other facility with appropriate resources: Identify barriers to discharge with patient and caregiver     Problem: Chronic Conditions and Co-morbidities  Goal: Patient's chronic conditions and co-morbidity symptoms are monitored and maintained or improved  Outcome: Progressing  Flowsheets (Taken 1/26/2024 0830 by Katy Degroot, RN)  Care Plan - Patient's Chronic Conditions and Co-Morbidity Symptoms are Monitored and Maintained or Improved: Monitor and assess patient's chronic conditions and comorbid symptoms for stability, deterioration, or improvement     Problem: Skin/Tissue Integrity  Goal: Absence of new skin breakdown  Description: 1.  Monitor for areas of redness and/or skin breakdown  2.  Assess vascular access sites hourly  3.  Every 4-6 hours minimum:  Change oxygen saturation probe site  4.  Every 4-6 hours:  If on nasal continuous positive airway pressure, respiratory therapy assess nares and determine need for appliance change or resting period.  Outcome: Progressing     Problem: Safety - Adult  Goal: Free from fall injury  Outcome: Progressing  Flowsheets (Taken 1/27/2024 0416)  Free From Fall Injury: Instruct family/caregiver on patient safety     Problem: Pain  Goal: Verbalizes/displays adequate comfort level or baseline comfort level  Outcome: Progressing  Flowsheets (Taken 1/27/2024 0416)  Verbalizes/displays adequate comfort level or baseline comfort level: Encourage patient to monitor pain and request assistance

## 2024-01-28 LAB
ALBUMIN SERPL BCG-MCNC: 3.7 G/DL (ref 3.5–5.1)
ALP SERPL-CCNC: 94 U/L (ref 38–126)
ALT SERPL W/O P-5'-P-CCNC: 42 U/L (ref 11–66)
ANION GAP SERPL CALC-SCNC: 13 MEQ/L (ref 8–16)
AST SERPL-CCNC: 27 U/L (ref 5–40)
BASOPHILS ABSOLUTE: 0 THOU/MM3 (ref 0–0.1)
BASOPHILS NFR BLD AUTO: 0.8 %
BILIRUB SERPL-MCNC: 0.4 MG/DL (ref 0.3–1.2)
BUN SERPL-MCNC: 21 MG/DL (ref 7–22)
CALCIUM SERPL-MCNC: 8.6 MG/DL (ref 8.5–10.5)
CHLORIDE SERPL-SCNC: 108 MEQ/L (ref 98–111)
CO2 SERPL-SCNC: 20 MEQ/L (ref 23–33)
CREAT SERPL-MCNC: 1 MG/DL (ref 0.4–1.2)
DEPRECATED RDW RBC AUTO: 43.6 FL (ref 35–45)
EOSINOPHIL NFR BLD AUTO: 4.7 %
EOSINOPHILS ABSOLUTE: 0.2 THOU/MM3 (ref 0–0.4)
ERYTHROCYTE [DISTWIDTH] IN BLOOD BY AUTOMATED COUNT: 12.5 % (ref 11.5–14.5)
GFR SERPL CREATININE-BSD FRML MDRD: > 60 ML/MIN/1.73M2
GLUCOSE SERPL-MCNC: 70 MG/DL (ref 70–108)
HCT VFR BLD AUTO: 37.3 % (ref 42–52)
HGB BLD-MCNC: 12 GM/DL (ref 14–18)
IMM GRANULOCYTES # BLD AUTO: 0.01 THOU/MM3 (ref 0–0.07)
IMM GRANULOCYTES NFR BLD AUTO: 0.2 %
LYMPHOCYTES ABSOLUTE: 1.4 THOU/MM3 (ref 1–4.8)
LYMPHOCYTES NFR BLD AUTO: 28.5 %
MCH RBC QN AUTO: 30.8 PG (ref 26–33)
MCHC RBC AUTO-ENTMCNC: 32.2 GM/DL (ref 32.2–35.5)
MCV RBC AUTO: 95.6 FL (ref 80–94)
MONOCYTES ABSOLUTE: 0.6 THOU/MM3 (ref 0.4–1.3)
MONOCYTES NFR BLD AUTO: 12.3 %
NEUTROPHILS NFR BLD AUTO: 53.5 %
NRBC BLD AUTO-RTO: 0 /100 WBC
PLATELET # BLD AUTO: 192 THOU/MM3 (ref 130–400)
PMV BLD AUTO: 10.4 FL (ref 9.4–12.4)
POTASSIUM SERPL-SCNC: 4 MEQ/L (ref 3.5–5.2)
PROT SERPL-MCNC: 6.5 G/DL (ref 6.1–8)
RBC # BLD AUTO: 3.9 MILL/MM3 (ref 4.7–6.1)
SEGMENTED NEUTROPHILS ABSOLUTE COUNT: 2.6 THOU/MM3 (ref 1.8–7.7)
SODIUM SERPL-SCNC: 141 MEQ/L (ref 135–145)
WBC # BLD AUTO: 4.9 THOU/MM3 (ref 4.8–10.8)

## 2024-01-28 PROCEDURE — 99231 SBSQ HOSP IP/OBS SF/LOW 25: CPT | Performed by: SURGERY

## 2024-01-28 PROCEDURE — 99232 SBSQ HOSP IP/OBS MODERATE 35: CPT | Performed by: INTERNAL MEDICINE

## 2024-01-28 PROCEDURE — 36415 COLL VENOUS BLD VENIPUNCTURE: CPT

## 2024-01-28 PROCEDURE — 80053 COMPREHEN METABOLIC PANEL: CPT

## 2024-01-28 PROCEDURE — 2580000003 HC RX 258

## 2024-01-28 PROCEDURE — 85025 COMPLETE CBC W/AUTO DIFF WBC: CPT

## 2024-01-28 PROCEDURE — 6370000000 HC RX 637 (ALT 250 FOR IP)

## 2024-01-28 PROCEDURE — 1200000000 HC SEMI PRIVATE

## 2024-01-28 RX ADMIN — FINASTERIDE 5 MG: 5 TABLET, FILM COATED ORAL at 10:09

## 2024-01-28 RX ADMIN — QUETIAPINE FUMARATE 500 MG: 300 TABLET, EXTENDED RELEASE ORAL at 10:09

## 2024-01-28 RX ADMIN — Medication 1000 UNITS: at 10:10

## 2024-01-28 RX ADMIN — SUCRALFATE 1 G: 1 TABLET ORAL at 17:37

## 2024-01-28 RX ADMIN — TAMSULOSIN HYDROCHLORIDE 0.8 MG: 0.4 CAPSULE ORAL at 10:09

## 2024-01-28 RX ADMIN — CITALOPRAM 40 MG: 40 TABLET, FILM COATED ORAL at 10:09

## 2024-01-28 RX ADMIN — PANTOPRAZOLE SODIUM 40 MG: 40 TABLET, DELAYED RELEASE ORAL at 05:39

## 2024-01-28 RX ADMIN — LEVOTHYROXINE SODIUM 88 MCG: 0.09 TABLET ORAL at 05:39

## 2024-01-28 RX ADMIN — SUCRALFATE 1 G: 1 TABLET ORAL at 10:09

## 2024-01-28 RX ADMIN — PANTOPRAZOLE SODIUM 40 MG: 40 TABLET, DELAYED RELEASE ORAL at 17:37

## 2024-01-28 RX ADMIN — PRAVASTATIN SODIUM 40 MG: 40 TABLET ORAL at 19:58

## 2024-01-28 RX ADMIN — SODIUM CHLORIDE, PRESERVATIVE FREE 10 ML: 5 INJECTION INTRAVENOUS at 10:09

## 2024-01-28 RX ADMIN — DOCUSATE SODIUM 100 MG: 100 CAPSULE, LIQUID FILLED ORAL at 10:09

## 2024-01-28 RX ADMIN — SUCRALFATE 1 G: 1 TABLET ORAL at 19:58

## 2024-01-28 RX ADMIN — TOPIRAMATE 100 MG: 100 TABLET, FILM COATED ORAL at 19:57

## 2024-01-28 RX ADMIN — MONTELUKAST SODIUM 10 MG: 10 TABLET ORAL at 19:58

## 2024-01-28 RX ADMIN — DOXEPIN HYDROCHLORIDE 25 MG: 25 CAPSULE ORAL at 19:57

## 2024-01-28 RX ADMIN — TOPIRAMATE 100 MG: 100 TABLET, FILM COATED ORAL at 10:09

## 2024-01-28 NOTE — PLAN OF CARE
Problem: Discharge Planning  Goal: Discharge to home or other facility with appropriate resources  Outcome: Progressing  Flowsheets (Taken 1/27/2024 0930 by Katy Degroot, RN)  Discharge to home or other facility with appropriate resources: Identify barriers to discharge with patient and caregiver     Problem: Chronic Conditions and Co-morbidities  Goal: Patient's chronic conditions and co-morbidity symptoms are monitored and maintained or improved  Outcome: Progressing  Flowsheets (Taken 1/27/2024 0930 by Katy Degroot, RN)  Care Plan - Patient's Chronic Conditions and Co-Morbidity Symptoms are Monitored and Maintained or Improved: Monitor and assess patient's chronic conditions and comorbid symptoms for stability, deterioration, or improvement     Problem: Skin/Tissue Integrity  Goal: Absence of new skin breakdown  Description: 1.  Monitor for areas of redness and/or skin breakdown  2.  Assess vascular access sites hourly  3.  Every 4-6 hours minimum:  Change oxygen saturation probe site  4.  Every 4-6 hours:  If on nasal continuous positive airway pressure, respiratory therapy assess nares and determine need for appliance change or resting period.  Outcome: Progressing     Problem: Safety - Adult  Goal: Free from fall injury  Outcome: Progressing  Flowsheets (Taken 1/27/2024 0416)  Free From Fall Injury: Instruct family/caregiver on patient safety     Problem: Pain  Goal: Verbalizes/displays adequate comfort level or baseline comfort level  Outcome: Progressing  Flowsheets (Taken 1/27/2024 0416)  Verbalizes/displays adequate comfort level or baseline comfort level: Encourage patient to monitor pain and request assistance

## 2024-01-29 VITALS
RESPIRATION RATE: 15 BRPM | HEART RATE: 67 BPM | SYSTOLIC BLOOD PRESSURE: 102 MMHG | TEMPERATURE: 97.9 F | BODY MASS INDEX: 24.43 KG/M2 | OXYGEN SATURATION: 99 % | DIASTOLIC BLOOD PRESSURE: 65 MMHG | WEIGHT: 180.34 LBS | HEIGHT: 72 IN

## 2024-01-29 LAB
EKG ATRIAL RATE: 58 BPM
EKG ATRIAL RATE: 77 BPM
EKG ATRIAL RATE: 93 BPM
EKG P AXIS: 34 DEGREES
EKG P AXIS: 47 DEGREES
EKG P AXIS: 54 DEGREES
EKG P-R INTERVAL: 126 MS
EKG P-R INTERVAL: 140 MS
EKG P-R INTERVAL: 146 MS
EKG Q-T INTERVAL: 394 MS
EKG Q-T INTERVAL: 418 MS
EKG Q-T INTERVAL: 498 MS
EKG QRS DURATION: 120 MS
EKG QRS DURATION: 122 MS
EKG QRS DURATION: 124 MS
EKG QTC CALCULATION (BAZETT): 473 MS
EKG QTC CALCULATION (BAZETT): 488 MS
EKG QTC CALCULATION (BAZETT): 489 MS
EKG R AXIS: -23 DEGREES
EKG R AXIS: -37 DEGREES
EKG R AXIS: -7 DEGREES
EKG T AXIS: 33 DEGREES
EKG T AXIS: 71 DEGREES
EKG T AXIS: 78 DEGREES
EKG VENTRICULAR RATE: 58 BPM
EKG VENTRICULAR RATE: 77 BPM
EKG VENTRICULAR RATE: 93 BPM

## 2024-01-29 PROCEDURE — 99239 HOSP IP/OBS DSCHRG MGMT >30: CPT | Performed by: INTERNAL MEDICINE

## 2024-01-29 PROCEDURE — 2580000003 HC RX 258

## 2024-01-29 PROCEDURE — 6370000000 HC RX 637 (ALT 250 FOR IP)

## 2024-01-29 PROCEDURE — APPSS30 APP SPLIT SHARED TIME 16-30 MINUTES: Performed by: NURSE PRACTITIONER

## 2024-01-29 PROCEDURE — 99232 SBSQ HOSP IP/OBS MODERATE 35: CPT | Performed by: SURGERY

## 2024-01-29 RX ADMIN — QUETIAPINE FUMARATE 500 MG: 300 TABLET, EXTENDED RELEASE ORAL at 08:14

## 2024-01-29 RX ADMIN — DOCUSATE SODIUM 100 MG: 100 CAPSULE, LIQUID FILLED ORAL at 08:14

## 2024-01-29 RX ADMIN — Medication 1000 UNITS: at 08:14

## 2024-01-29 RX ADMIN — LEVOTHYROXINE SODIUM 88 MCG: 0.09 TABLET ORAL at 05:47

## 2024-01-29 RX ADMIN — SUCRALFATE 1 G: 1 TABLET ORAL at 12:07

## 2024-01-29 RX ADMIN — CITALOPRAM 40 MG: 40 TABLET, FILM COATED ORAL at 08:13

## 2024-01-29 RX ADMIN — PANTOPRAZOLE SODIUM 40 MG: 40 TABLET, DELAYED RELEASE ORAL at 05:47

## 2024-01-29 RX ADMIN — TAMSULOSIN HYDROCHLORIDE 0.8 MG: 0.4 CAPSULE ORAL at 08:14

## 2024-01-29 RX ADMIN — TOPIRAMATE 100 MG: 100 TABLET, FILM COATED ORAL at 08:14

## 2024-01-29 RX ADMIN — FINASTERIDE 5 MG: 5 TABLET, FILM COATED ORAL at 08:14

## 2024-01-29 RX ADMIN — SODIUM CHLORIDE, PRESERVATIVE FREE 10 ML: 5 INJECTION INTRAVENOUS at 08:16

## 2024-01-29 RX ADMIN — SUCRALFATE 1 G: 1 TABLET ORAL at 05:47

## 2024-01-29 NOTE — CARE COORDINATION
1/29/24, 1:31 PM EST    Patient goals/plan/ treatment preferences discussed by  and .  Patient goals/plan/ treatment preferences reviewed with patient/ family.  Patient/ family verbalize understanding of discharge plan and are in agreement with goal/plan/treatment preferences.  Understanding was demonstrated using the teach back method.  AVS provided by RN at time of discharge, which includes all necessary medical information pertaining to the patients current course of illness, treatment, post-discharge goals of care, and treatment preferences.     Services At/After Discharge: Assisted Living       IMM Letter  IMM Letter given to Patient/Family/Significant other/Guardian/POA/by:: staff  IMM Letter date given:: 01/29/24  IMM Letter time given:: 1313     Pt is being discharged today back to [facility].  CARLI spoke with pts sisterVidhi (legal guardian).  Pts brother in law will be here at 3 pm to transport pt.  CARLI left message at the AL.  CARLI faxed AVS.  NILAY Lucero is aware.

## 2024-01-29 NOTE — DISCHARGE SUMMARY
DISCHARGE SUMMARY      Patient Identification:   Luis Enrique Valdes   : 1960  MRN: [id number]   Account: [id number]      Patient's PCP: Joseph Rose MD    Admit Date: 2024     Discharge Date:   24    Admitting Physician: Ranjit Stevenson MD     Discharge Physician: Obey Fitzpatrick MD     Discharge Diagnoses:    Partial small bowel obstruction noted on imaging  Brugada changes noted on EKG   CAD  Essential hypertension  COPD  Hypothyroidism  Hyperlipidemia  BPH  GERD  Anxiety/depression  Psychiatric disorder/history of seizures        Partial small bowel obstruction noted on imaging  -- Tmax 100.2 on 2524  -- S/p LR bolus  -- Influenza, COVID-negative  -- Lipase 16.2  -- Lactic acid 1.5  -- CT A/P 2524: Focally dilated small bowel loops in the right lower quadrant with decompression of the more distal small bowel loops, the remaining small bowel loops are not dilated, moderate retained fecal matter seen throughout the colon, stomach is mildly distended, small bilateral pleural effusions  -- KUB : Mildly dilated gas-filled loop of small bowel in the left upper quadrants, mild to moderate amount of stool in the descending colon  -- CXR 24: Left lower lung infiltrate  -- NG tube removed on  successfully and advance to clear liquid diet  - As of , patient was advanced to full liquid diet  - 24 Patient advanced to regular diet  -- Restart aspirin and Plavix     Brugada changes noted on EKG   -- EKG : NSR, left axis deviation, Brugada pattern type I  -- EKG : Sinus bradycardia  -- Asymptomatic     CAD  -- Troponin 12  -- Cardiac stress 2022: Negative for ischemia  -- Echo 2022: EF 55 to 60%, mild mitral regurg  -- On aspirin and Plavix, restarted      Essential hypertension  -- Chronic and stable  -- Continue home Lopressor      COPD  -- Continue home albuterol, Singulair     Hypothyroidism  -- Continue home Synthroid  -- TSH : 1.82     Hyperlipidemia  --

## 2024-01-29 NOTE — PLAN OF CARE
Problem: Discharge Planning  Goal: Discharge to home or other facility with appropriate resources  Outcome: Progressing  Flowsheets (Taken 1/28/2024 1000 by Katy Degroot, RN)  Discharge to home or other facility with appropriate resources: Identify barriers to discharge with patient and caregiver     Problem: Chronic Conditions and Co-morbidities  Goal: Patient's chronic conditions and co-morbidity symptoms are monitored and maintained or improved  Outcome: Progressing  Flowsheets (Taken 1/28/2024 1000 by Katy Degroot, RN)  Care Plan - Patient's Chronic Conditions and Co-Morbidity Symptoms are Monitored and Maintained or Improved: Monitor and assess patient's chronic conditions and comorbid symptoms for stability, deterioration, or improvement     Problem: Skin/Tissue Integrity  Goal: Absence of new skin breakdown  Description: 1.  Monitor for areas of redness and/or skin breakdown  2.  Assess vascular access sites hourly  3.  Every 4-6 hours minimum:  Change oxygen saturation probe site  4.  Every 4-6 hours:  If on nasal continuous positive airway pressure, respiratory therapy assess nares and determine need for appliance change or resting period.  Outcome: Progressing     Problem: Safety - Adult  Goal: Free from fall injury  Outcome: Progressing  Flowsheets (Taken 1/27/2024 0416)  Free From Fall Injury: Instruct family/caregiver on patient safety     Problem: Pain  Goal: Verbalizes/displays adequate comfort level or baseline comfort level  Outcome: Progressing  Flowsheets (Taken 1/27/2024 0416)  Verbalizes/displays adequate comfort level or baseline comfort level: Encourage patient to monitor pain and request assistance

## 2024-01-29 NOTE — PROGRESS NOTES
[facility]  General Surgery - Ruth Ann Ly, APRN - CNP  On behalf of Dr. Sandy Bashir  Daily Progress Note  Pt Name: Luis Enrique Valdes  Medical Record Number: [id number]  Date of Birth 1960   Today's Date: 1/26/2024  Chief complaint: SBO  ASSESSMENT:   Hospital day #1  Mild/Partial SBO vs ileus   Abdominal pain LQ improving   N&V improved since NG tube   Fecal stasis   CAD on plavix  1 formed BM this am    has a past medical history of Angina at rest, Anxiety, Anxiety, Arthritis, Asperger's disorder, Asthma, CAD (coronary artery disease), Cervical radiculopathy, Chest pain, CHF (congestive heart failure) ([facility]), COPD (chronic obstructive pulmonary disease) ([facility]), Depression, Depression, Gall stones, GERD (gastroesophageal reflux disease), Hyperlipidemia, Hypertension, Mentally challenged, Seizures ([facility]), Thyroid disease, and Tremor.  RECOMMENDATIONS:   Conservative management  NPO  Continue NG tube LIWS 500ml/24h  Okay for oral meds  Hold Plavix   Analgesia and antiemetics as needed   KUB reviewed  SBFT today  Fleets enema once   Discharge planning pending progress  SUBJECTIVE:   Luis Enrique is doing a little better. He denies nausea or vomiting, has passed flatus and had a bowel movement.Tenderness is slightly better, KUB reviewed - impression is ileus vs mild/partial sbo.  SBFT today.  He is tolerating a Diet NPO Exceptions are: Other (Specify); Specify Other Exceptions: mouth swabs for dry mouth. His pain is  controlled on current medications.   MEDICATIONS   Scheduled Meds:   sodium phosphate  1 enema Rectal Once    sodium chloride flush  10 mL IntraVENous 2 times per day    citalopram  40 mg Oral Daily    [Held by provider] clopidogrel  75 mg Oral Daily    docusate sodium  100 mg Oral BID    doxepin  25 mg Oral Nightly    finasteride  5 mg Oral Daily    Vitamin D  1,000 Units Oral Daily    topiramate  100 mg Oral BID    tamsulosin  0.8 mg Oral Daily    levothyroxine  88 mcg Oral Once per day on Sun Mon Tue 
  PROGRESS NOTE      Patient:  Luis Enrique Valdes  Unit/Bed:6K-09/009-A  YOB: 1960  MRN: [id number]   Acct: [id number]    PCP: Joseph Rose MD    Date of Admission: 1/25/2024 LOS: 0    Date of Evaluation:  1/26/2024    Anticipated Discharge: Pending clinical course likely 1 to 2 days    Assessment/Plan:    Partial small bowel obstruction noted on imaging  Tmax 100.2 on 1/2524 at 1805, will consider getting cultures if patient spikes fever.  S/p LR bolus  Influenza, COVID-negative  Lipase 16.2  Lactic acid 1.5  CT A/P 1/2524: Focally dilated small bowel loops in the right lower quadrant with decompression of the more distal small bowel loops, the remaining small bowel loops are not dilated, moderate retained fecal matter seen throughout the colon, stomach is mildly distended, small bilateral pleural effusions  NG tube placed in the ED, continue NG tube  KUB 1/26: Mildly dilated gas-filled loop of small bowel in the left upper quadrants, mild to moderate amount of stool in the descending colon  General surgery following , recommended small bowel follow-through.  Will follow-up.  CXR 1/25/24: Left lower lung infiltrate  Continue to hold aspirin and Plavix  Continue n.p.o.  Continue Colace twice daily, GlycoLax twice daily  Brugada changes noted on EKG   EKG 1/25: NSR, left axis deviation, Brugada pattern type I  EKG 1/26: Sinus bradycardia  Asymptomatic  Will continue to monitor  CAD  Troponin 12  Cardiac stress 4/8/2022: Negative for ischemia  Echo 4/7/2022: EF 55 to 60%, mild mitral regurg  On aspirin and Plavix, will continue to hold.  Essential hypertension  Chronic and stable  Continue home Lopressor   COPD  Continue home albuterol, Singulair  Hypothyroidism  Continue home Synthroid  TSH 1/25: 1.82  Hyperlipidemia  Continue home statin  BPH  Continue home Proscar, Flomax  GERD  Continue Protonix, Carafate  Anxiety/depression  Continue home medication Celexa, doxepin  Psychiatric disorder/history of 
Advanced directives Consult: Pt wanted his sister who is his POA to be present before it's completed. He was dealing with abdominal pain but was encouraged. Prayer was appreciated.    01/26/24 1948   Encounter Summary   Encounter Overview/Reason  Advance Care Planning   Service Provided For: Patient and family together   Referral/Consult From: Nurse   Support System Family members   Last Encounter  01/26/24   Complexity of Encounter Low   Begin Time 1900   End Time  1917   Total Time Calculated 17 min   Spiritual/Emotional needs   Type Spiritual Support   Advance Care Planning   Type ACP conversation   Assessment/Intervention/Outcome   Assessment Hopeful   Intervention Empowerment   Outcome Encouraged        
[facility]   Dr. Valentin Fair MD  Daily Progress Note  Pt Name: Luis Enrique Valdes  Medical Record Number: [id number]  Date of Birth 1960   Today's Date: 1/27/2024    HD#2 seen for     CHIEF COMPLAINT SBO    SUBJECTIVE  Patient feels better  had BM overnite    OBJECTIVE  CURRENT VITALS BP (!) 147/67   Pulse 62   Temp 97.7 °F (36.5 °C) (Oral)   Resp 18   Ht 1.829 m (6')   Wt 90.7 kg (199 lb 15.3 oz)   SpO2 94%   BMI 27.12 kg/m²   LUNGS: Lungs clear   ABDOMEN: soft BS +  WOUNDS: n/a  24 HR INTAKE/OUTPUT :   Intake/Output Summary (Last 24 hours) at 1/27/2024 1022  Last data filed at 1/27/2024 0937  Gross per 24 hour   Intake 1643 ml   Output 2230 ml   Net -587 ml     DRAIN/TUBE OUTPUT : NG/OG/NJ/NE Tube Nasogastric 18 fr Right nostril-Output (mL): 20 ml    LABS  CBC :   Lab Results   Component Value Date/Time    WBC 6.0 01/27/2024 07:43 AM    HGB 12.6 01/27/2024 07:43 AM    HCT 39.4 01/27/2024 07:43 AM     01/27/2024 07:43 AM     BMP:   Lab Results   Component Value Date/Time     01/27/2024 07:43 AM    K 3.9 01/27/2024 07:43 AM     01/27/2024 07:43 AM    CO2 20 01/27/2024 07:43 AM    BUN 20 01/27/2024 07:43 AM    CREATININE 1.0 01/27/2024 07:43 AM    MG 2.0 01/25/2024 10:00 AM    PHOS 3.8 03/13/2015 01:49 PM     OCEDURE: FL SMALL BOWEL FOLLOW THROUGH ONLY    CLINICAL INFORMATION: rule out sbo    COMPARISON: Abdominal radiograph from earlier same day.    TECHNIQUE:  Multiple AP supine views of the abdomen were obtained prior to and following the intravenous administration of 90 mL oral Gastrografin.  Images were obtained at 20, 40, 60, and 90 minutes following contrast administration as well as 3 1/2  hours postcontrast.    FINDINGS:    Initial images demonstrate gaseous distention of several small bowel loops overlying the mid abdomen. There is also a moderate amount of retained stool within the descending colon.    Postcontrast images demonstrate contrast within 
Discharge teaching and instructions for diagnosis/procedure of small bowel obstruction completed with patient using teachback method. MONTSERRAT reviewed and nurse Mcwilliams given report at [facility]. Discharged in a wheelchair to  assisted living per family.    
Patient arrived on unit, suction set up, IVF initiated, VS obtained. Call light in reach.   
[facility] . No answer . Will continue to repeat calls as needed .   
Synthroid  -- TSH 1/25: 1.82    Hyperlipidemia  -- Continue home statin    BPH  -- Continue home Proscar, Flomax    GERD  -- Continue Protonix, Carafate    Anxiety/depression  -- Continue home medication Celexa, doxepin    Psychiatric disorder/history of seizures  -- Continue home Seroquel, Topamax    Dispo: If patient tolerates regular diet, okay to return to assisted living facility.    Chief Complaint: Nausea and vomiting    Hospital Course: Per HPI, \"Maurice is a 64-year-old  male with a past medical history of COPD, anxiety, depression, GERD, HTN who presents to [facility] ED today from assisted living for the evaluation of nausea vomiting and abdominal pain.  Patient reports he woke up yesterday feeling sick however developed nausea and vomiting in the afternoon into the evening and overnight.  Patient was reportedly sent to ER due to being febrile with Tmax noted at 101F, persistent nausea and vomiting and severe abdominal pain.  Patient reports last night he experienced associated chest pain that radiated to his back and between his shoulder blades however reports this has since resolved.  He is unable to recall how long his chest pain had lasted.  His abdominal pain is primarily located in the upper abdomen, described as pressure and tightness, no radiation of pain.  He reports normal bowel movements up until yesterday- reports having no bowel movement at all yesterday or today.  He denies any other symptoms at this time.  Patient does remark that he has had difficulty urinating x 2 months, difficulty maintaining stream.  He otherwise denies any recent dietary changes, lifestyle changes or medication changes.  He denies chest pain, shortness of breath, lightheadedness or dizziness at this time.  Patient states he is very fatigued due to getting no sleep last night from his symptoms.\"      Subjective: Patient was seen and examined at bedside today morning.  Patient denies any chest pain, shortness of breath, 
lactated ringers bolus  500 mL IntraVENous Once    [START ON 2024] levothyroxine  132 mcg Oral Weekly     Continuous Infusions:   sodium chloride       PRN Meds:.diatrizoate meglumine-sodium, sodium chloride flush, sodium chloride, potassium chloride **OR** potassium alternative oral replacement **OR** potassium chloride, magnesium sulfate, ondansetron **OR** ondansetron, polyethylene glycol, acetaminophen **OR** acetaminophen, albuterol sulfate HFA, cyclobenzaprine, HYDROmorphone **OR** HYDROmorphone, nitroGLYCERIN  OBJECTIVE   CURRENT VITALS:  height is 1.829 m (6') and weight is 81.8 kg (180 lb 5.4 oz). His oral temperature is 97.5 °F (36.4 °C). His blood pressure is 115/70 and his pulse is 48 (abnormal). His respiration is 12 and oxygen saturation is 94%.   Temperature Range (24h):Temp: 97.5 °F (36.4 °C) Temp  Av.1 °F (36.7 °C)  Min: 97.3 °F (36.3 °C)  Max: 99 °F (37.2 °C)  BP Range (24h): Systolic (24hrs), Av , Min:97 , Max:147     Diastolic (24hrs), Av, Min:54, Max:84    Pulse Range (24h): Pulse  Av.7  Min: 48  Max: 68  Respiration Range (24h): Resp  Av.3  Min: 12  Max: 20  Current Pulse Ox (24h):  SpO2: 94 %  Pulse Ox Range (24h):  SpO2  Av.2 %  Min: 93 %  Max: 96 %  Oxygen Amount and Delivery:    Incentive Spirometry Tx:            GENERAL: alert, no distress  LUNGS: clear to ausculation, without wheezes, rales or rhonci  HEART: normal rate and regular rhythm  ABDOMEN: BS active, non distended, non tender,no guarding or peritoneal signs    In: 750 [P.O.:750]  Out: 1250 [Urine:1250]     Date 24 0000 - 24 2359   Shift 7344-0350 0626-7176 5218-3710 24 Hour Total   INTAKE   P.O.(mL/kg/hr) 240(0.4)   240   Shift Total(mL/kg) 240(2.9)   240(2.9)   OUTPUT   Urine(mL/kg/hr) 550(0.8)   550   Emesis/NG output(mL/kg) 0(0)   0(0)   Other(mL/kg) 0(0)   0(0)   Stool(mL/kg) 0(0)   0(0)   Blood(mL/kg) 0(0)   0(0)   Shift Total(mL/kg) 550(6.7)   550(6.7)   Weight (kg) 81.8 81.8 
Max:91    Pulse Range (24h): Pulse  Av.3  Min: 50  Max: 70  Respiration Range (24h): Resp  Av  Min: 14  Max: 18  Current Pulse Ox (24h):  SpO2: 99 %  Pulse Ox Range (24h):  SpO2  Av.8 %  Min: 94 %  Max: 99 %  Oxygen Amount and Delivery:    Incentive Spirometry Tx:            GENERAL: alert, no distress  LUNGS: clear to ausculation, without wheezes, rales or rhonci  HEART: normal rate and regular rhythm  ABDOMEN: BS active, non distended, non tender,no guarding or peritoneal signs    In: 1440 [P.O.:1440]  Out: 1600 [Urine:1600]     Date 24 0000 - 24   Shift 7751-9645 4805-0251 3464-1719 24 Hour Total   INTAKE   P.O. 240 360  600   Shift Total(mL/kg) 240(2.9) 360(4.4)  600(7.3)   OUTPUT   Urine(mL/kg/hr) 400(0.6) 300  700   Emesis/NG output 0 0  0   Other 0 0  0   Stool 0 0  0   Blood 0 0  0   Shift Total(mL/kg) 400(4.9) 300(3.7)  700(8.6)   Weight (kg) 81.8 81.8 81.8 81.8     LABS     Recent Labs     24  0743 24  0645   WBC 6.0 4.9   HGB 12.6* 12.0*   HCT 39.4* 37.3*    192    141   K 3.9 4.0    108   CO2 20* 20*   BUN 20 21   CREATININE 1.0 1.0   CALCIUM 8.7 8.6      No results for input(s): \"PTT\", \"INR\" in the last 72 hours.    Invalid input(s): \"PT\"  Recent Labs     24  0743 24  0645   AST 38 27   ALT 58 42   BILITOT 0.3 0.4     No results for input(s): \"TROPONINT\" in the last 72 hours.      RADIOLOGY     FL SMALL BOWEL FOLLOW THROUGH ONLY   Final Result   1. Although there is gaseous distention of small bowel loops overlying the mid abdomen on the noncontrast image, the subsequent transit of contrast through the bowel on the postcontrast images is within normal limits with contrast reaching the ascending    colon by the 60 minute lissy. The distended small bowel loops could be related to possible focal ileus.            **This report has been created using voice recognition software.  It may contain minor errors which are inherent in voice 
mid abdomen on the noncontrast image, the subsequent transit of contrast through the bowel on the postcontrast images is within normal limits with contrast reaching the ascending    colon by the 60 minute lissy. The distended small bowel loops could be related to possible focal ileus.            **This report has been created using voice recognition software.  It may contain minor errors which are inherent in voice recognition technology.**      Final report electronically signed by Dr. Stewart Eubanks on 1/27/2024 9:45 AM      XR ABDOMEN (KUB) (SINGLE AP VIEW)   Final Result      Mildly dilated gas-filled loop of small bowel in the left upper quadrant.    This may be transient. The differential diagnosis includes ileus and    mild/partial small bowel obstruction. Correlate clinically.      Mild-to-moderate amount of stool in the descending colon.      This document has been electronically signed by: Jose Dale MD on    01/26/2024 07:11 AM      XR CHEST PORTABLE   Final Result      Left lower lung atelectasis/infiltrate.               **This report has been created using voice recognition software. It may contain minor errors which are inherent in voice recognition technology.**      Final report electronically signed by Dr. Best Marie on 1/25/2024 1:56 PM      CT ABDOMEN PELVIS W IV CONTRAST Additional Contrast? Oral   Final Result   1. Focally dilated small bowel loops in the right lower quadrant measure up to 4 cm (series 301, image 45) with decompression of more distal small bowel loops which is a nonspecific finding but could indicate partial small bowel obstruction. The    remaining small bowel loops are not dilated. Moderate retained fecal material is seen throughout the colon suggesting fecal stasis. The stomach is mildly distended and contains oral contrast.      2. Small bilateral pleural effusions. Chronic findings are discussed.            **This report has been created using voice recognition software.

## 2024-02-23 ENCOUNTER — TELEPHONE (OUTPATIENT)
Dept: CARDIOLOGY CLINIC | Age: 64
End: 2024-02-23

## 2024-02-23 ENCOUNTER — OFFICE VISIT (OUTPATIENT)
Dept: CARDIOLOGY CLINIC | Age: 64
End: 2024-02-23
Payer: MEDICARE

## 2024-02-23 VITALS
RESPIRATION RATE: 18 BRPM | HEIGHT: 72 IN | WEIGHT: 211 LBS | DIASTOLIC BLOOD PRESSURE: 58 MMHG | HEART RATE: 64 BPM | SYSTOLIC BLOOD PRESSURE: 96 MMHG | BODY MASS INDEX: 28.58 KG/M2

## 2024-02-23 DIAGNOSIS — E78.5 HYPERLIPIDEMIA LDL GOAL <100: ICD-10-CM

## 2024-02-23 DIAGNOSIS — I10 PRIMARY HYPERTENSION: ICD-10-CM

## 2024-02-23 DIAGNOSIS — I25.10 CORONARY ARTERY DISEASE INVOLVING NATIVE CORONARY ARTERY OF NATIVE HEART WITHOUT ANGINA PECTORIS: Primary | ICD-10-CM

## 2024-02-23 PROCEDURE — 3074F SYST BP LT 130 MM HG: CPT | Performed by: NURSE PRACTITIONER

## 2024-02-23 PROCEDURE — 1123F ACP DISCUSS/DSCN MKR DOCD: CPT | Performed by: NURSE PRACTITIONER

## 2024-02-23 PROCEDURE — 3078F DIAST BP <80 MM HG: CPT | Performed by: NURSE PRACTITIONER

## 2024-02-23 PROCEDURE — 99214 OFFICE O/P EST MOD 30 MIN: CPT | Performed by: NURSE PRACTITIONER

## 2024-02-23 NOTE — TELEPHONE ENCOUNTER
Per Antonette:  Please have the patient's pharmacist go through ALL meds for polypharmacy.      LM for Michael Herfrank to let us know if patient has a private pharmacy or does she use their pharmacy.

## 2024-02-23 NOTE — PROGRESS NOTES
[facility] PROFESSIONAL SERVICES  [facility] [address]   [address]   [facility] [address]   Dept: [phone]   Dept Fax: [phone]   Loc: [phone]      Chief Complaint   Patient presents with    Follow-up   Yearly followup     Cardiologist:  Dr. Bethea                Today's visit:   Subjective:      General:   No fever, no chills, No fatigue or weight loss/gain  Pulmonary:    No dyspnea, no wheezing  Cardiac:    Denies recent chest discomfort or palpitations  GI:     No nausea or vomiting, no abdominal pain, no black or tarry stools, no blood in stools  Neuro:    No dizziness or light headedness  Musculoskeletal:  No recent active issues, no new musculoskeletal pain  Extremities:   No swelling or claudication symptoms      Past Medical History:   Diagnosis Date    Angina at rest     intermittent    Anxiety     Anxiety     Arthritis     Asperger's disorder     Asthma     CAD (coronary artery disease)     Cervical radiculopathy     Chest pain     CHF (congestive heart failure) ([facility])     COPD (chronic obstructive pulmonary disease) ([facility])     Depression     Depression     Gall stones     GERD (gastroesophageal reflux disease)     Hyperlipidemia     Hypertension     Mentally challenged     Seizures ([facility])     Thyroid disease     Tremor        Allergies   Allergen Reactions    Morphine Shortness Of Breath     convulsions    Caffeine      Nervous and shakes all over    Percodan [Oxycodone-Aspirin]     Tape [Adhesive Tape] Dermatitis    Zyban [Bupropion Hcl]     Dicyclomine Hcl Nausea And Vomiting    Oxycodone Nausea And Vomiting       Current Outpatient Medications   Medication Sig Dispense Refill    QUEtiapine Fumarate (SEROQUEL PO) Take 500 mg by mouth daily      promethazine (PHENERGAN) 25 MG tablet Take 1 tablet by mouth every 6 hours as needed for Nausea      LORazepam (ATIVAN) 1 MG tablet Take 1 tablet by mouth daily. 1/2-1 daily      guaiFENesin (MUCINEX) 600 MG extended release  Reviewed Prior Education, Gait, Stairs, Verbal Exercise Instruction, - Patient Requires Continued Education    Goals:  Patient goals : go home  Short term goals  Time Frame for Short term goals: 2 weeks  Short term goal 1: Patient will complete supine < > sit with supervision to transfer in/out of bed with decreased difficulty. Short term goal 2: Patient will complete sit < > stand with SBA to increase independence with transfers. Short term goal 3: Patietn will ambulate 150' wtih a RW and SBA to progress towards safe home ambulation. Short term goal 4: Patietn will ascend/descend 3 steps with 2 hand rails and CGA to progress towards safe home access. Short term goal 5: Patient will complete car transfer with CGA to transfer in/out of vehicle to prepare for safe home transport. Long term goals  Time Frame for Long term goals : 3 weeks  Long term goal 1: Patient will complete supine < > sit with modified independence to transfer in/out of vehicle safely. Long term goal 2: Patient will complete sit < > stand and stand pivot transfers with modified independence to stand to ambulate wtih decreased difficulty. Long term goal 3: Patient will ambulate 80' with a RW and modified independence to navigate home safely. Long term goal 4: Patient will ascend/descend 3 steps with 2 hand rails and modified independence to access/leave home safely. Long term goal 5: Patient will complete car transfer with supervision to transport to home safely. Long term goal 6: Patient will ascend/descend 1, 6\" step with RW and supervision for community entry. Following session, patient left in safe position with all fall risk precautions in place.

## 2024-02-27 NOTE — TELEPHONE ENCOUNTER
JOHN PAUL for [employer] to return call.     Called pt's sister, Johana. On HIPAA.  She states they have to go through [employer] but they use [facility].  She said you must go through the [employer] though.

## 2024-03-24 ENCOUNTER — APPOINTMENT (OUTPATIENT)
Dept: GENERAL RADIOLOGY | Age: 64
End: 2024-03-24
Payer: MEDICARE

## 2024-03-24 ENCOUNTER — HOSPITAL ENCOUNTER (EMERGENCY)
Age: 64
Discharge: HOME | End: 2024-03-24
Attending: EMERGENCY MEDICINE
Payer: MEDICARE

## 2024-03-24 VITALS
RESPIRATION RATE: 17 BRPM | WEIGHT: 181 LBS | DIASTOLIC BLOOD PRESSURE: 90 MMHG | SYSTOLIC BLOOD PRESSURE: 166 MMHG | HEART RATE: 55 BPM | HEIGHT: 71 IN | OXYGEN SATURATION: 98 % | BODY MASS INDEX: 25.34 KG/M2 | TEMPERATURE: 98.2 F

## 2024-03-24 DIAGNOSIS — T59.811A SMOKE INHALATION DUE TO CHEMICAL FUMES AND VAPORS: Primary | ICD-10-CM

## 2024-03-24 DIAGNOSIS — J02.9 SORE THROAT: ICD-10-CM

## 2024-03-24 LAB
INFLUENZA A BY PCR: NOT DETECTED
INFLUENZA B BY PCR: NOT DETECTED
S PYO AG THROAT QL: NEGATIVE
SARS-COV-2 RNA, RT PCR: NOT DETECTED

## 2024-03-24 PROCEDURE — 87651 STREP A DNA AMP PROBE: CPT

## 2024-03-24 PROCEDURE — 99284 EMERGENCY DEPT VISIT MOD MDM: CPT

## 2024-03-24 PROCEDURE — 6370000000 HC RX 637 (ALT 250 FOR IP): Performed by: EMERGENCY MEDICINE

## 2024-03-24 PROCEDURE — 87636 SARSCOV2 & INF A&B AMP PRB: CPT

## 2024-03-24 PROCEDURE — 71046 X-RAY EXAM CHEST 2 VIEWS: CPT

## 2024-03-24 RX ORDER — ACETAMINOPHEN 325 MG/1
650 TABLET ORAL ONCE
Status: COMPLETED | OUTPATIENT
Start: 2024-03-24 | End: 2024-03-24

## 2024-03-24 RX ADMIN — ACETAMINOPHEN 650 MG: 325 TABLET ORAL at 07:29

## 2024-03-24 ASSESSMENT — PAIN - FUNCTIONAL ASSESSMENT: PAIN_FUNCTIONAL_ASSESSMENT: NONE - DENIES PAIN
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
## 2024-03-24 NOTE — ED NOTES
Spoke to pt's sister Francesca, she is attempting to make arrangements to come and provide pt. Ride home.  Pt. Updated and given crackers.

## 2024-03-24 NOTE — DISCHARGE INSTRUCTIONS
Rest, drink plenty of fluids.  Continue all of your current medications, inhalers, breathing treatments.  Avoid further exposure to smoke and other respiratory irritants.  Follow-up with your doctor

## 2024-03-24 NOTE — DISCHARGE INSTR - COC
Continuity of Care Form    Patient Name: Luis Enrique Valdes   :  1960  MRN:  [id number]    Admit date:  3/24/2024  Discharge date:  ***    Code Status Order: Prior   Advance Directives:     Admitting Physician:  No admitting provider for patient encounter.  PCP: Joseph Rose MD    Discharging Nurse: ***  Discharging Hospital Unit/Room#: 1TR/TR1  Discharging Unit Phone Number: ***    Emergency Contact:   Extended Emergency Contact Information  Primary Emergency Contact: [place] [address] [facility]  Home Phone: [phone]  Mobile Phone: [phone]  Relation: Brother/Sister  Secondary Emergency Contact: Vidhi Small (Sister)  Address: [place] [address] [id number] [place] [address] [facility]  Home Phone: [phone]  Mobile Phone: [phone]  Relation: Legal Guardian    Past Surgical History:  Past Surgical History:   Procedure Laterality Date    ABDOMEN SURGERY      CARDIAC SURGERY      Heart Cath    CHOLECYSTECTOMY      CHOLECYSTECTOMY, LAPAROSCOPIC      COLONOSCOPY      COLONOSCOPY      DILATATION, ESOPHAGUS      ENDOSCOPY, COLON, DIAGNOSTIC      ESOPHAGEAL DILATATION      KNEE ARTHROSCOPY Left 1984    NECK SURGERY  1996    plate in neck and fusion    SIGMOIDOSCOPY Left 2020    SIGMOIDOSCOPY DIAGNOSTIC FLEXIBLE performed by Magdi Aaron MD at [facility]    [facility]  2016    UPPER GASTROINTESTINAL ENDOSCOPY         Immunization History:   Immunization History   Administered Date(s) Administered    Influenza Virus Vaccine 2012, 10/04/2013    Pneumococcal, PPSV23, PNEUMOVAX 23, (age 2y+), SC/IM, 0.5mL 10/04/2013       Active Problems:  Patient Active Problem List   Diagnosis Code    Pituitary adenoma (HCC) D35.2    Hypogonadism male E29.1    History of seizure disorder Z86.69    Cognitive impairment R41.89    Hyperlipidemia with target LDL less than 100 E78.5    CAD (coronary artery disease) I25.10

## 2024-03-24 NOTE — ED PROVIDER NOTES
Esophagus dilation; Colonoscopy; Upper gastrointestinal endoscopy; Abdomen surgery; Endoscopy, colon, diagnostic; Dilatation, esophagus; Cholecystectomy (2002); Cardiac surgery; TURP (2/11/2016); and sigmoidoscopy (Left, 6/30/2020).    CURRENT MEDICATIONS    Previous Medications    ACETAMINOPHEN (TYLENOL) 500 MG TABLET    Take 1 tablet by mouth every 4 hours as needed for Pain    ALBUTEROL SULFATE (PROAIR HFA IN)    Inhale 90 mcg into the lungs every 4 hours as needed    ARTIFICIAL TEAR OP    Apply to eye 1 drop in each eye 2-4 times per day    ASPIRIN 81 MG TABLET    Take 1 tablet by mouth daily    BENZONATATE (TESSALON) 100 MG CAPSULE    Take 1 capsule by mouth daily    CALCIUM CARBONATE (TUMS) 500 MG CHEWABLE TABLET    Take 1 tablet by mouth as needed for Heartburn    CITALOPRAM (CELEXA) 40 MG TABLET    Take 1 tablet by mouth daily.    CLOPIDOGREL (PLAVIX) 75 MG TABLET    Take 1 tablet by mouth daily    CYCLOBENZAPRINE (FLEXERIL) 10 MG TABLET    Take 1 tablet by mouth 2 times daily as needed for Muscle spasms    DOCUSATE SODIUM (COLACE) 100 MG CAPSULE    Take 1 capsule by mouth 2 times daily    DOXEPIN (SINEQUAN) 25 MG CAPSULE    Take 1 capsule by mouth nightly    FINASTERIDE (PROSCAR) 5 MG TABLET    Take 1 tablet by mouth daily    GUAIFENESIN (MUCINEX) 600 MG EXTENDED RELEASE TABLET    Take 1 tablet by mouth 2 times daily    HYDROCODONE-ACETAMINOPHEN (NORCO) 5-325 MG PER TABLET    Take 1 tablet by mouth every 6 hours as needed for Pain.    HYDROXYZINE (ATARAX) 25 MG TABLET    Take 1 tablet by mouth 4 times daily as needed for Itching    IPRATROPIUM-ALBUTEROL (DUONEB) 0.5-2.5 (3) MG/3ML SOLN NEBULIZER SOLUTION    Inhale 3 mLs into the lungs every 4 hours as needed for Shortness of Breath    LACTOBACILLUS ACIDOPHILUS (FLORANEX)    Take 1 tablet by mouth 3 times daily    LOPERAMIDE (IMODIUM) 2 MG CAPSULE    Take 1 capsule by mouth as needed for Diarrhea    LORATADINE (CLARITIN) 10 MG TABLET    Take 1 tablet by

## 2024-03-24 NOTE — ED NOTES
Dr. Bainbridge at pt. Bedside discussing test results and plans for discharge.  Pt. Voices will need to contact his sister for transportation to [facility].

## 2024-04-20 ENCOUNTER — HOSPITAL ENCOUNTER (EMERGENCY)
Age: 64
Discharge: HOME | End: 2024-04-20
Payer: MEDICARE

## 2024-04-20 ENCOUNTER — APPOINTMENT (OUTPATIENT)
Dept: GENERAL RADIOLOGY | Age: 64
End: 2024-04-20
Payer: MEDICARE

## 2024-04-20 VITALS
TEMPERATURE: 98.6 F | BODY MASS INDEX: 24.38 KG/M2 | HEART RATE: 58 BPM | RESPIRATION RATE: 23 BRPM | DIASTOLIC BLOOD PRESSURE: 79 MMHG | OXYGEN SATURATION: 96 % | WEIGHT: 180 LBS | SYSTOLIC BLOOD PRESSURE: 171 MMHG | HEIGHT: 72 IN

## 2024-04-20 DIAGNOSIS — R07.9 CHEST PAIN, UNSPECIFIED TYPE: Primary | ICD-10-CM

## 2024-04-20 LAB
ANION GAP SERPL CALC-SCNC: 10 MEQ/L (ref 8–16)
BASOPHILS ABSOLUTE: 0.1 THOU/MM3 (ref 0–0.1)
BASOPHILS NFR BLD AUTO: 1 %
BUN SERPL-MCNC: 16 MG/DL (ref 7–22)
CALCIUM SERPL-MCNC: 8.8 MG/DL (ref 8.5–10.5)
CHLORIDE SERPL-SCNC: 106 MEQ/L (ref 98–111)
CO2 SERPL-SCNC: 23 MEQ/L (ref 23–33)
CREAT SERPL-MCNC: 1 MG/DL (ref 0.4–1.2)
DEPRECATED RDW RBC AUTO: 45.1 FL (ref 35–45)
EKG ATRIAL RATE: 56 BPM
EKG P AXIS: 48 DEGREES
EKG P-R INTERVAL: 142 MS
EKG Q-T INTERVAL: 502 MS
EKG QRS DURATION: 132 MS
EKG QTC CALCULATION (BAZETT): 484 MS
EKG R AXIS: -5 DEGREES
EKG T AXIS: 53 DEGREES
EKG VENTRICULAR RATE: 56 BPM
EOSINOPHIL NFR BLD AUTO: 6.2 %
EOSINOPHILS ABSOLUTE: 0.4 THOU/MM3 (ref 0–0.4)
ERYTHROCYTE [DISTWIDTH] IN BLOOD BY AUTOMATED COUNT: 12.7 % (ref 11.5–14.5)
FLUAV RNA RESP QL NAA+PROBE: NOT DETECTED
FLUBV RNA RESP QL NAA+PROBE: NOT DETECTED
GFR SERPL CREATININE-BSD FRML MDRD: 83 ML/MIN/1.73M2
GLUCOSE SERPL-MCNC: 102 MG/DL (ref 70–108)
HCT VFR BLD AUTO: 38.1 % (ref 42–52)
HGB BLD-MCNC: 12.1 GM/DL (ref 14–18)
IMM GRANULOCYTES # BLD AUTO: 0.02 THOU/MM3 (ref 0–0.07)
IMM GRANULOCYTES NFR BLD AUTO: 0.3 %
LYMPHOCYTES ABSOLUTE: 1.2 THOU/MM3 (ref 1–4.8)
LYMPHOCYTES NFR BLD AUTO: 17.3 %
MCH RBC QN AUTO: 31 PG (ref 26–33)
MCHC RBC AUTO-ENTMCNC: 31.8 GM/DL (ref 32.2–35.5)
MCV RBC AUTO: 97.7 FL (ref 80–94)
MONOCYTES ABSOLUTE: 0.8 THOU/MM3 (ref 0.4–1.3)
MONOCYTES NFR BLD AUTO: 11.3 %
NEUTROPHILS NFR BLD AUTO: 63.9 %
NRBC BLD AUTO-RTO: 0 /100 WBC
NT-PROBNP SERPL IA-MCNC: 459 PG/ML (ref 0–124)
OSMOLALITY SERPL CALC.SUM OF ELEC: 278.9 MOSMOL/KG (ref 275–300)
PLATELET # BLD AUTO: 172 THOU/MM3 (ref 130–400)
PMV BLD AUTO: 11.1 FL (ref 9.4–12.4)
POTASSIUM SERPL-SCNC: 4.3 MEQ/L (ref 3.5–5.2)
RBC # BLD AUTO: 3.9 MILL/MM3 (ref 4.7–6.1)
SARS-COV-2 RNA RESP QL NAA+PROBE: NOT DETECTED
SEGMENTED NEUTROPHILS ABSOLUTE COUNT: 4.5 THOU/MM3 (ref 1.8–7.7)
SODIUM SERPL-SCNC: 139 MEQ/L (ref 135–145)
TROPONIN, HIGH SENSITIVITY: 12 NG/L (ref 0–12)
TROPONIN, HIGH SENSITIVITY: 13 NG/L (ref 0–12)
WBC # BLD AUTO: 7.1 THOU/MM3 (ref 4.8–10.8)

## 2024-04-20 PROCEDURE — 80048 BASIC METABOLIC PNL TOTAL CA: CPT

## 2024-04-20 PROCEDURE — 93005 ELECTROCARDIOGRAM TRACING: CPT | Performed by: EMERGENCY MEDICINE

## 2024-04-20 PROCEDURE — 83880 ASSAY OF NATRIURETIC PEPTIDE: CPT

## 2024-04-20 PROCEDURE — 71045 X-RAY EXAM CHEST 1 VIEW: CPT

## 2024-04-20 PROCEDURE — 85025 COMPLETE CBC W/AUTO DIFF WBC: CPT

## 2024-04-20 PROCEDURE — 87636 SARSCOV2 & INF A&B AMP PRB: CPT

## 2024-04-20 PROCEDURE — 99285 EMERGENCY DEPT VISIT HI MDM: CPT

## 2024-04-20 PROCEDURE — 84484 ASSAY OF TROPONIN QUANT: CPT

## 2024-04-20 PROCEDURE — 36415 COLL VENOUS BLD VENIPUNCTURE: CPT

## 2024-04-20 RX ORDER — METHYLPREDNISOLONE 4 MG/1
TABLET ORAL
Qty: 1 KIT | Refills: 0 | Status: SHIPPED | OUTPATIENT
Start: 2024-04-20 | End: 2024-04-26

## 2024-04-20 ASSESSMENT — PAIN SCALES - GENERAL: PAINLEVEL_OUTOF10: 2

## 2024-04-20 ASSESSMENT — PAIN - FUNCTIONAL ASSESSMENT: PAIN_FUNCTIONAL_ASSESSMENT: 0-10

## 2024-04-20 NOTE — CONSENT
[facility] EMERGENCY DEPT      EMERGENCY MEDICINE     Pt Name: Luis Enrique Valdes  MRN: [id number]  Birthdate 1960  Date of evaluation: 4/20/2024  Provider: YOLANDA Hester    CHIEF COMPLAINT       Chief Complaint   Patient presents with    Chest Pain     HISTORY OF PRESENT ILLNESS   Luis Enrique Valdes is a pleasant 64 y.o. male who presents to the emergency department from from home, by private vehicle for evaluation of complains of being ill for the last 3 days with sore throat and cough.  He got a coughing episode this morning got some chest pain.  He states that he took a nitroglycerin and it went away.  He states that he currently has no chest pain now.  He did have a sore throat..        PASTMEDICAL HISTORY     Past Medical History:   Diagnosis Date    Angina at rest ([facility])     intermittent    Anxiety     Anxiety     Arthritis     Asperger's disorder     Asthma     CAD (coronary artery disease)     Cervical radiculopathy     Chest pain     CHF (congestive heart failure) ([facility])     COPD (chronic obstructive pulmonary disease) ([facility])     Depression     Depression     Gall stones     GERD (gastroesophageal reflux disease)     Hyperlipidemia     Hypertension     Mentally challenged     Seizures ([facility])     Thyroid disease     Tremor        Patient Active Problem List   Diagnosis Code    Pituitary adenoma ([facility]) D35.2    Hypogonadism male E29.1    History of seizure disorder Z86.69    Cognitive impairment R41.89    Hyperlipidemia with target LDL less than 100 E78.5    CAD (coronary artery disease) I25.10    Psychiatric disorder F99    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

## 2024-04-20 NOTE — ED TRIAGE NOTES
Pt presents to ED with chest pain and a cough that began 3 days ago. Pt notes that this morning at 3am it worsened. Pt notes the cough is productive. On arrival, EMS stated that patient took one nitro at home with no relief. Pt states, \"the pain is high up in my chest. I have a heart history and was just concerned.\" Pt rates pain 2/10 at this time. EKG complete, , no distress noted.

## 2024-04-20 NOTE — ED PROVIDER NOTES
[facility] EMERGENCY DEPT        EMERGENCY MEDICINE      Pt Name: Luis Enrique Valdes  MRN: [id number]  Birthdate 1960  Date of evaluation: 4/20/2024  Provider: YOLANDA Hester     CHIEF COMPLAINT            Chief Complaint   Patient presents with    Chest Pain      HISTORY OF PRESENT ILLNESS   Luis Enrique Valdes is a pleasant 64 y.o. male who presents to the emergency department from from home, by private vehicle for evaluation of complains of being ill for the last 3 days with sore throat and cough.  He got a coughing episode this morning got some chest pain.  He states that he took a nitroglycerin and it went away.  He states that he currently has no chest pain now.  He did have a sore throat..           PASTMEDICAL HISTORY      Past Medical History        Past Medical History:   Diagnosis Date    Angina at rest ([facility])       intermittent    Anxiety      Anxiety      Arthritis      Asperger's disorder      Asthma      CAD (coronary artery disease)      Cervical radiculopathy      Chest pain      CHF (congestive heart failure) ([facility])      COPD (chronic obstructive pulmonary disease) ([facility])      Depression      Depression      Gall stones      GERD (gastroesophageal reflux disease)      Hyperlipidemia      Hypertension      Mentally challenged      Seizures ([facility])      Thyroid disease      Tremor                   Patient Active Problem List   Diagnosis Code    Pituitary adenoma ([facility]) D35.2    Hypogonadism male E29.1    History of seizure disorder Z86.69    Cognitive impairment R41.89    Hyperlipidemia with target LDL less than 100 E78.5    CAD (coronary artery disease) I25.10    Psychiatric disorder F99    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

## 2024-04-20 NOTE — ED NOTES
Pt in bed, eyes open, respirations even and unlabored. Vital signs reassessed, no needs voiced by pt. Updated on POC.

## 2024-04-22 PROCEDURE — 93010 ELECTROCARDIOGRAM REPORT: CPT | Performed by: INTERNAL MEDICINE

## 2024-04-24 LAB
EKG ATRIAL RATE: 56 BPM
EKG P AXIS: 48 DEGREES
EKG P-R INTERVAL: 142 MS
EKG Q-T INTERVAL: 502 MS
EKG QRS DURATION: 132 MS
EKG QTC CALCULATION (BAZETT): 484 MS
EKG R AXIS: -5 DEGREES
EKG T AXIS: 53 DEGREES
EKG VENTRICULAR RATE: 56 BPM

## 2024-09-17 ENCOUNTER — APPOINTMENT (OUTPATIENT)
Dept: GENERAL RADIOLOGY | Age: 64
End: 2024-09-17
Payer: MEDICARE

## 2024-09-17 ENCOUNTER — APPOINTMENT (OUTPATIENT)
Dept: CT IMAGING | Age: 64
End: 2024-09-17
Payer: MEDICARE

## 2024-09-17 ENCOUNTER — HOSPITAL ENCOUNTER (EMERGENCY)
Age: 64
Discharge: HOME | End: 2024-09-17
Attending: EMERGENCY MEDICINE
Payer: MEDICARE

## 2024-09-17 VITALS
TEMPERATURE: 97.1 F | RESPIRATION RATE: 13 BRPM | HEIGHT: 72 IN | BODY MASS INDEX: 23.03 KG/M2 | OXYGEN SATURATION: 100 % | WEIGHT: 170 LBS | DIASTOLIC BLOOD PRESSURE: 69 MMHG | SYSTOLIC BLOOD PRESSURE: 160 MMHG | HEART RATE: 47 BPM

## 2024-09-17 DIAGNOSIS — Z79.02 ANTIPLATELET OR ANTITHROMBOTIC LONG-TERM USE: ICD-10-CM

## 2024-09-17 DIAGNOSIS — R00.1 SINUS BRADYCARDIA: ICD-10-CM
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
## 2024-09-17 DIAGNOSIS — S40.012A: ICD-10-CM

## 2024-09-17 DIAGNOSIS — S80.02XA CONTUSION OF LEFT KNEE, INITIAL ENCOUNTER: ICD-10-CM

## 2024-09-17 DIAGNOSIS — D64.9 ANEMIA, UNSPECIFIED TYPE: ICD-10-CM

## 2024-09-17 DIAGNOSIS — R42 DIZZINESS: ICD-10-CM

## 2024-09-17 LAB
ALBUMIN SERPL BCP-MCNC: 3.5 GM/DL (ref 3.4–5)
ALP SERPL-CCNC: 77 U/L (ref 46–116)
ALT SERPL W P-5'-P-CCNC: 18 U/L (ref 14–63)
ANION GAP SERPL CALC-SCNC: 10 MEQ/L (ref 8–16)
AST SERPL W P-5'-P-CCNC: 19 U/L (ref 15–37)
BASOPHILS # BLD: 1.1 % (ref 0–3)
BASOPHILS ABSOLUTE: 0.1 THOU/MM3 (ref 0–0.1)
BILIRUB SERPL-MCNC: 0.3 MG/DL (ref 0.2–1)
BUN SERPL-MCNC: 21 MG/DL (ref 7–18)
CALCIUM SERPL-MCNC: 8.5 MG/DL (ref 8.5–10.1)
CHLORIDE SERPL-SCNC: 105 MEQ/L (ref 98–107)
CO2 SERPL-SCNC: 23 MEQ/L (ref 21–32)
CREAT SERPL-MCNC: 1.3 MG/DL (ref 0.6–1.3)
EKG ATRIAL RATE: 47 BPM
EKG P AXIS: 57 DEGREES
EKG P-R INTERVAL: 148 MS
EKG Q-T INTERVAL: 538 MS
EKG QRS DURATION: 120 MS
EKG QTC CALCULATION (BAZETT): 476 MS
EKG R AXIS: 2 DEGREES
EKG T AXIS: 46 DEGREES
EKG VENTRICULAR RATE: 47 BPM
EOSINOPHILS ABSOLUTE: 0.3 THOU/MM3 (ref 0–0.5)
EOSINOPHILS RELATIVE PERCENT: 7.2 % (ref 0–4)
GFR SERPL CREATININE-BSD FRML MDRD: 60 ML/MIN/1.73M2
GLUCOSE SERPL-MCNC: 106 MG/DL (ref 74–106)
HCT VFR BLD CALC: 32 % (ref 42–52)
HEMOGLOBIN: 10.5 GM/DL (ref 14–18)
IMMATURE GRANS (ABS): 0 THOU/MM3 (ref 0–0.07)
IMMATURE GRANULOCYTES %: 0 %
LYMPHOCYTES # BLD AUTO: 32.5 % (ref 15–47)
LYMPHOCYTES ABSOLUTE: 1.5 THOU/MM3 (ref 1–4.8)
MAGNESIUM SERPL-MCNC: 2 MG/DL (ref 1.8–2.4)
MCH RBC QN AUTO: 31.7 PG (ref 26–32)
MCHC RBC AUTO-ENTMCNC: 32.8 GM/DL (ref 31–35)
MCV RBC AUTO: 96.7 FL (ref 80–94)
MONOCYTES: 0.5 THOU/MM3 (ref 0.3–1.3)
MONOCYTES: 10.8 % (ref 0–12)
NEUTROPHILS ABSOLUTE: 2.3 THOU/MM3 (ref 1.8–7.7)
PDW BLD-RTO: 12.7 % (ref 11.5–14.9)
PLATELET # BLD AUTO: 158 THOU/MM3 (ref 130–400)
PMV BLD AUTO: 10.8 FL (ref 9.4–12.4)
POTASSIUM SERPL-SCNC: 4.3 MEQ/L (ref 3.5–5.1)
PROT SERPL-MCNC: 6.7 GM/DL (ref 6.4–8.2)
RBC # BLD: 3.31 MILL/MM3 (ref 4.5–6.1)
SEG NEUTROPHILS: 48.4 % (ref 43–75)
SODIUM SERPL-SCNC: 138 MEQ/L (ref 136–145)
TROPONIN, HIGH SENSITIVITY: 6.3 PG/ML (ref 0–76.1)
WBC # BLD: 4.7 THOU/MM3 (ref 4.8–10.8)

## 2024-09-17 PROCEDURE — 99285 EMERGENCY DEPT VISIT HI MDM: CPT

## 2024-09-17 PROCEDURE — 93005 ELECTROCARDIOGRAM TRACING: CPT | Performed by: EMERGENCY MEDICINE

## 2024-09-17 PROCEDURE — 93010 ELECTROCARDIOGRAM REPORT: CPT | Performed by: INTERNAL MEDICINE

## 2024-09-17 PROCEDURE — 73564 X-RAY EXAM KNEE 4 OR MORE: CPT

## 2024-09-17 PROCEDURE — 2580000003 HC RX 258: Performed by: EMERGENCY MEDICINE

## 2024-09-17 PROCEDURE — 85025 COMPLETE CBC W/AUTO DIFF WBC: CPT

## 2024-09-17 PROCEDURE — 72125 CT NECK SPINE W/O DYE: CPT

## 2024-09-17 PROCEDURE — 70450 CT HEAD/BRAIN W/O DYE: CPT

## 2024-09-17 PROCEDURE — 83735 ASSAY OF MAGNESIUM: CPT

## 2024-09-17 PROCEDURE — 84484 ASSAY OF TROPONIN QUANT: CPT

## 2024-09-17 PROCEDURE — 80053 COMPREHEN METABOLIC PANEL: CPT

## 2024-09-17 PROCEDURE — 73090 X-RAY EXAM OF FOREARM: CPT

## 2024-09-17 PROCEDURE — 73080 X-RAY EXAM OF ELBOW: CPT

## 2024-09-17 PROCEDURE — 73060 X-RAY EXAM OF HUMERUS: CPT

## 2024-09-17 PROCEDURE — 73030 X-RAY EXAM OF SHOULDER: CPT

## 2024-09-17 RX ORDER — 0.9 % SODIUM CHLORIDE 0.9 %
1000 INTRAVENOUS SOLUTION INTRAVENOUS ONCE
Status: COMPLETED | OUTPATIENT
Start: 2024-09-17 | End: 2024-09-17

## 2024-09-17 RX ADMIN — SODIUM CHLORIDE 1000 ML: 9 INJECTION, SOLUTION INTRAVENOUS at 14:25

## 2024-09-18 ASSESSMENT — ENCOUNTER SYMPTOMS
SHORTNESS OF BREATH: 0
NAUSEA: 0
ABDOMINAL PAIN: 0
COUGH: 0

## 2024-11-25 NOTE — ED NOTES
Pt alert and oriented. Respirations regular and easy. Discharge instructions reviewed. States understanding. Pt discharged in satisfactory condition.        Anish Welch RN  10/28/21 [id number]
Pt presents w/ c/o left wrist pain that radiates to his elbow and first 3 fingers. States that he has been having pain for 3 days. He was using a saw to trim some branches and was using his hands to break branches from a tree that fell at [facility] DR TRINH PARKER. No swelling noted. He has had pain for 3 days.        Haile Arnold RN  10/28/21 [id number]
No

## 2024-11-27 ENCOUNTER — HOSPITAL ENCOUNTER (INPATIENT)
Age: 64
LOS: 1 days | Discharge: NURSING, REHAB OR LONG-TERM CARE | DRG: 287 | End: 2024-11-30
Attending: EMERGENCY MEDICINE | Admitting: INTERNAL MEDICINE
Payer: MEDICARE

## 2024-11-27 DIAGNOSIS — I20.0 UNSTABLE ANGINA (HCC): ICD-10-CM

## 2024-11-27 DIAGNOSIS — R07.9 CHEST PAIN, UNSPECIFIED TYPE: Primary | ICD-10-CM

## 2024-11-27 PROCEDURE — 93005 ELECTROCARDIOGRAM TRACING: CPT | Performed by: EMERGENCY MEDICINE

## 2024-11-27 PROCEDURE — 99285 EMERGENCY DEPT VISIT HI MDM: CPT

## 2024-11-27 PROCEDURE — 96365 THER/PROPH/DIAG IV INF INIT: CPT

## 2024-11-27 ASSESSMENT — PAIN SCALES - GENERAL: PAINLEVEL_OUTOF10: 0

## 2024-11-28 ENCOUNTER — APPOINTMENT (OUTPATIENT)
Dept: GENERAL RADIOLOGY | Age: 64
DRG: 287 | End: 2024-11-28
Payer: MEDICARE

## 2024-11-28 PROBLEM — I20.0 UNSTABLE ANGINA (HCC): Status: ACTIVE | Noted: 2024-11-28

## 2024-11-28 LAB
ALBUMIN SERPL BCG-MCNC: 3.7 G/DL (ref 3.5–5.1)
ALP SERPL-CCNC: 71 U/L (ref 38–126)
ALT SERPL W/O P-5'-P-CCNC: 29 U/L (ref 11–66)
ANION GAP SERPL CALC-SCNC: 10 MEQ/L (ref 8–16)
ANION GAP SERPL CALC-SCNC: 11 MEQ/L (ref 8–16)
APTT PPP: 34.2 SECONDS (ref 22–38)
AST SERPL-CCNC: 37 U/L (ref 5–40)
BASOPHILS ABSOLUTE: 0 THOU/MM3 (ref 0–0.1)
BASOPHILS ABSOLUTE: 0.1 THOU/MM3 (ref 0–0.1)
BASOPHILS NFR BLD AUTO: 0.7 %
BASOPHILS NFR BLD AUTO: 1.2 %
BILIRUB SERPL-MCNC: 0.2 MG/DL (ref 0.3–1.2)
BUN SERPL-MCNC: 19 MG/DL (ref 7–22)
BUN SERPL-MCNC: 19 MG/DL (ref 7–22)
CALCIUM SERPL-MCNC: 8.8 MG/DL (ref 8.5–10.5)
CALCIUM SERPL-MCNC: 9.4 MG/DL (ref 8.5–10.5)
CHLORIDE SERPL-SCNC: 108 MEQ/L (ref 98–111)
CHLORIDE SERPL-SCNC: 108 MEQ/L (ref 98–111)
CHOLEST SERPL-MCNC: 133 MG/DL (ref 100–199)
CO2 SERPL-SCNC: 21 MEQ/L (ref 23–33)
CO2 SERPL-SCNC: 23 MEQ/L (ref 23–33)
CREAT SERPL-MCNC: 1 MG/DL (ref 0.4–1.2)
CREAT SERPL-MCNC: 1 MG/DL (ref 0.4–1.2)
DEPRECATED MEAN GLUCOSE BLD GHB EST-ACNC: 111 MG/DL (ref 70–126)
DEPRECATED RDW RBC AUTO: 43.3 FL (ref 35–45)
DEPRECATED RDW RBC AUTO: 43.8 FL (ref 35–45)
EKG ATRIAL RATE: 50 BPM
EKG ATRIAL RATE: 52 BPM
EKG P AXIS: 42 DEGREES
EKG P AXIS: 50 DEGREES
EKG P-R INTERVAL: 148 MS
EKG P-R INTERVAL: 148 MS
EKG Q-T INTERVAL: 520 MS
EKG Q-T INTERVAL: 526 MS
EKG QRS DURATION: 128 MS
EKG QRS DURATION: 132 MS
EKG QTC CALCULATION (BAZETT): 474 MS
EKG QTC CALCULATION (BAZETT): 489 MS
EKG R AXIS: 1 DEGREES
EKG R AXIS: 2 DEGREES
EKG T AXIS: 36 DEGREES
EKG T AXIS: 37 DEGREES
EKG VENTRICULAR RATE: 50 BPM
EKG VENTRICULAR RATE: 52 BPM
EOSINOPHIL NFR BLD AUTO: 10.3 %
EOSINOPHIL NFR BLD AUTO: 10.3 %
EOSINOPHILS ABSOLUTE: 0.6 THOU/MM3 (ref 0–0.4)
EOSINOPHILS ABSOLUTE: 0.6 THOU/MM3 (ref 0–0.4)
ERYTHROCYTE [DISTWIDTH] IN BLOOD BY AUTOMATED COUNT: 12.2 % (ref 11.5–14.5)
ERYTHROCYTE [DISTWIDTH] IN BLOOD BY AUTOMATED COUNT: 12.3 % (ref 11.5–14.5)
FLUAV RNA RESP QL NAA+PROBE: NOT DETECTED
FLUBV RNA RESP QL NAA+PROBE: NOT DETECTED
GFR SERPL CREATININE-BSD FRML MDRD: 83 ML/MIN/1.73M2
GFR SERPL CREATININE-BSD FRML MDRD: 83 ML/MIN/1.73M2
GLUCOSE SERPL-MCNC: 101 MG/DL (ref 70–108)
GLUCOSE SERPL-MCNC: 103 MG/DL (ref 70–108)
HBA1C MFR BLD HPLC: 5.7 % (ref 4.4–6.4)
HCT VFR BLD AUTO: 32 % (ref 42–52)
HCT VFR BLD AUTO: 35.8 % (ref 42–52)
HDLC SERPL-MCNC: 51 MG/DL
HEPARIN UNFRACTIONATED: 0.05 U/ML (ref 0.3–0.7)
HEPARIN UNFRACTIONATED: 0.29 U/ML (ref 0.3–0.7)
HEPARIN UNFRACTIONATED: 0.37 U/ML (ref 0.3–0.7)
HEPARIN UNFRACTIONATED: 0.54 U/ML (ref 0.3–0.7)
HGB BLD-MCNC: 10.4 GM/DL (ref 14–18)
HGB BLD-MCNC: 11.7 GM/DL (ref 14–18)
IMM GRANULOCYTES # BLD AUTO: 0.01 THOU/MM3 (ref 0–0.07)
IMM GRANULOCYTES # BLD AUTO: 0.02 THOU/MM3 (ref 0–0.07)
IMM GRANULOCYTES NFR BLD AUTO: 0.2 %
IMM GRANULOCYTES NFR BLD AUTO: 0.3 %
INR PPP: 1.1 (ref 0.85–1.13)
LDLC SERPL CALC-MCNC: 73 MG/DL
LYMPHOCYTES ABSOLUTE: 1.5 THOU/MM3 (ref 1–4.8)
LYMPHOCYTES ABSOLUTE: 2.3 THOU/MM3 (ref 1–4.8)
LYMPHOCYTES NFR BLD AUTO: 27.3 %
LYMPHOCYTES NFR BLD AUTO: 40.5 %
MAGNESIUM SERPL-MCNC: 2.1 MG/DL (ref 1.6–2.4)
MCH RBC QN AUTO: 31.5 PG (ref 26–33)
MCH RBC QN AUTO: 31.6 PG (ref 26–33)
MCHC RBC AUTO-ENTMCNC: 32.5 GM/DL (ref 32.2–35.5)
MCHC RBC AUTO-ENTMCNC: 32.7 GM/DL (ref 32.2–35.5)
MCV RBC AUTO: 96.8 FL (ref 80–94)
MCV RBC AUTO: 97 FL (ref 80–94)
MONOCYTES ABSOLUTE: 0.6 THOU/MM3 (ref 0.4–1.3)
MONOCYTES ABSOLUTE: 0.6 THOU/MM3 (ref 0.4–1.3)
MONOCYTES NFR BLD AUTO: 10.3 %
MONOCYTES NFR BLD AUTO: 11.2 %
NEUTROPHILS ABSOLUTE: 2.2 THOU/MM3 (ref 1.8–7.7)
NEUTROPHILS ABSOLUTE: 2.7 THOU/MM3 (ref 1.8–7.7)
NEUTROPHILS NFR BLD AUTO: 37.4 %
NEUTROPHILS NFR BLD AUTO: 50.3 %
NRBC BLD AUTO-RTO: 0 /100 WBC
NRBC BLD AUTO-RTO: 0 /100 WBC
NT-PROBNP SERPL IA-MCNC: 330.9 PG/ML (ref 0–124)
OSMOLALITY SERPL CALC.SUM OF ELEC: 281.9 MOSMOL/KG (ref 275–300)
OSMOLALITY SERPL CALC.SUM OF ELEC: 283.7 MOSMOL/KG (ref 275–300)
PLATELET # BLD AUTO: 165 THOU/MM3 (ref 130–400)
PLATELET # BLD AUTO: 183 THOU/MM3 (ref 130–400)
PMV BLD AUTO: 10.7 FL (ref 9.4–12.4)
PMV BLD AUTO: 10.9 FL (ref 9.4–12.4)
POTASSIUM SERPL-SCNC: 4.1 MEQ/L (ref 3.5–5.2)
POTASSIUM SERPL-SCNC: 4.9 MEQ/L (ref 3.5–5.2)
PROT SERPL-MCNC: 6.2 G/DL (ref 6.1–8)
RBC # BLD AUTO: 3.3 MILL/MM3 (ref 4.7–6.1)
RBC # BLD AUTO: 3.7 MILL/MM3 (ref 4.7–6.1)
SARS-COV-2 RNA RESP QL NAA+PROBE: NOT DETECTED
SODIUM SERPL-SCNC: 140 MEQ/L (ref 135–145)
SODIUM SERPL-SCNC: 141 MEQ/L (ref 135–145)
T4 FREE SERPL-MCNC: 0.78 NG/DL (ref 0.93–1.68)
TRIGL SERPL-MCNC: 44 MG/DL (ref 0–199)
TROPONIN, HIGH SENSITIVITY: 13 NG/L (ref 0–12)
TSH SERPL DL<=0.005 MIU/L-ACNC: 3.21 UIU/ML (ref 0.4–4.2)
WBC # BLD AUTO: 5.4 THOU/MM3 (ref 4.8–10.8)
WBC # BLD AUTO: 5.8 THOU/MM3 (ref 4.8–10.8)

## 2024-11-28 PROCEDURE — 84443 ASSAY THYROID STIM HORMONE: CPT

## 2024-11-28 PROCEDURE — 99223 1ST HOSP IP/OBS HIGH 75: CPT | Performed by: INTERNAL MEDICINE

## 2024-11-28 PROCEDURE — 85025 COMPLETE CBC W/AUTO DIFF WBC: CPT

## 2024-11-28 PROCEDURE — 80053 COMPREHEN METABOLIC PANEL: CPT

## 2024-11-28 PROCEDURE — 6370000000 HC RX 637 (ALT 250 FOR IP)

## 2024-11-28 PROCEDURE — 71045 X-RAY EXAM CHEST 1 VIEW: CPT

## 2024-11-28 PROCEDURE — 80061 LIPID PANEL: CPT

## 2024-11-28 PROCEDURE — 93010 ELECTROCARDIOGRAM REPORT: CPT | Performed by: INTERNAL MEDICINE

## 2024-11-28 PROCEDURE — 6360000002 HC RX W HCPCS

## 2024-11-28 PROCEDURE — G0378 HOSPITAL OBSERVATION PER HR: HCPCS

## 2024-11-28 PROCEDURE — 83735 ASSAY OF MAGNESIUM: CPT

## 2024-11-28 PROCEDURE — 85730 THROMBOPLASTIN TIME PARTIAL: CPT

## 2024-11-28 PROCEDURE — 84439 ASSAY OF FREE THYROXINE: CPT

## 2024-11-28 PROCEDURE — 36415 COLL VENOUS BLD VENIPUNCTURE: CPT

## 2024-11-28 PROCEDURE — 2580000003 HC RX 258

## 2024-11-28 PROCEDURE — 85520 HEPARIN ASSAY: CPT

## 2024-11-28 PROCEDURE — 83036 HEMOGLOBIN GLYCOSYLATED A1C: CPT

## 2024-11-28 PROCEDURE — 96366 THER/PROPH/DIAG IV INF ADDON: CPT

## 2024-11-28 PROCEDURE — 84484 ASSAY OF TROPONIN QUANT: CPT

## 2024-11-28 PROCEDURE — 99223 1ST HOSP IP/OBS HIGH 75: CPT

## 2024-11-28 PROCEDURE — 87636 SARSCOV2 & INF A&B AMP PRB: CPT

## 2024-11-28 PROCEDURE — 93005 ELECTROCARDIOGRAM TRACING: CPT

## 2024-11-28 PROCEDURE — 85610 PROTHROMBIN TIME: CPT

## 2024-11-28 PROCEDURE — 83880 ASSAY OF NATRIURETIC PEPTIDE: CPT

## 2024-11-28 RX ORDER — SUCRALFATE 1 G/1
1 TABLET ORAL
Status: DISCONTINUED | OUTPATIENT
Start: 2024-11-28 | End: 2024-11-30 | Stop reason: HOSPADM

## 2024-11-28 RX ORDER — ACETAMINOPHEN 650 MG/1
650 SUPPOSITORY RECTAL EVERY 6 HOURS PRN
Status: DISCONTINUED | OUTPATIENT
Start: 2024-11-28 | End: 2024-11-30 | Stop reason: HOSPADM

## 2024-11-28 RX ORDER — ASPIRIN 81 MG/1
81 TABLET, CHEWABLE ORAL DAILY
Status: DISCONTINUED | OUTPATIENT
Start: 2024-11-28 | End: 2024-11-30 | Stop reason: HOSPADM

## 2024-11-28 RX ORDER — CITALOPRAM HYDROBROMIDE 40 MG/1
40 TABLET ORAL DAILY
Status: DISCONTINUED | OUTPATIENT
Start: 2024-11-28 | End: 2024-11-30 | Stop reason: HOSPADM

## 2024-11-28 RX ORDER — MONTELUKAST SODIUM 10 MG/1
10 TABLET ORAL DAILY
Status: DISCONTINUED | OUTPATIENT
Start: 2024-11-28 | End: 2024-11-30 | Stop reason: HOSPADM

## 2024-11-28 RX ORDER — SODIUM CHLORIDE 9 MG/ML
INJECTION, SOLUTION INTRAVENOUS PRN
Status: DISCONTINUED | OUTPATIENT
Start: 2024-11-28 | End: 2024-11-30 | Stop reason: HOSPADM

## 2024-11-28 RX ORDER — TAMSULOSIN HYDROCHLORIDE 0.4 MG/1
0.4 CAPSULE ORAL 2 TIMES DAILY
Status: DISCONTINUED | OUTPATIENT
Start: 2024-11-28 | End: 2024-11-30 | Stop reason: HOSPADM

## 2024-11-28 RX ORDER — LEVOTHYROXINE SODIUM 88 UG/1
88 TABLET ORAL DAILY
Status: DISCONTINUED | OUTPATIENT
Start: 2024-11-28 | End: 2024-11-29

## 2024-11-28 RX ORDER — HEPARIN SODIUM 1000 [USP'U]/ML
4000 INJECTION, SOLUTION INTRAVENOUS; SUBCUTANEOUS PRN
Status: DISCONTINUED | OUTPATIENT
Start: 2024-11-28 | End: 2024-11-30

## 2024-11-28 RX ORDER — TROSPIUM CHLORIDE 20 MG/1
20 TABLET, FILM COATED ORAL
Status: DISCONTINUED | OUTPATIENT
Start: 2024-11-28 | End: 2024-11-30 | Stop reason: HOSPADM

## 2024-11-28 RX ORDER — ONDANSETRON 4 MG/1
4 TABLET, ORALLY DISINTEGRATING ORAL EVERY 8 HOURS PRN
Status: DISCONTINUED | OUTPATIENT
Start: 2024-11-28 | End: 2024-11-30 | Stop reason: HOSPADM

## 2024-11-28 RX ORDER — POTASSIUM CHLORIDE 7.45 MG/ML
10 INJECTION INTRAVENOUS PRN
Status: DISCONTINUED | OUTPATIENT
Start: 2024-11-28 | End: 2024-11-30 | Stop reason: HOSPADM

## 2024-11-28 RX ORDER — HEPARIN SODIUM 10000 [USP'U]/100ML
5-30 INJECTION, SOLUTION INTRAVENOUS CONTINUOUS
Status: DISCONTINUED | OUTPATIENT
Start: 2024-11-28 | End: 2024-11-30

## 2024-11-28 RX ORDER — IPRATROPIUM BROMIDE AND ALBUTEROL SULFATE 2.5; .5 MG/3ML; MG/3ML
1 SOLUTION RESPIRATORY (INHALATION) EVERY 4 HOURS PRN
Status: DISCONTINUED | OUTPATIENT
Start: 2024-11-28 | End: 2024-11-30 | Stop reason: HOSPADM

## 2024-11-28 RX ORDER — TOPIRAMATE 100 MG/1
100 TABLET, FILM COATED ORAL 2 TIMES DAILY
Status: DISCONTINUED | OUTPATIENT
Start: 2024-11-28 | End: 2024-11-30 | Stop reason: HOSPADM

## 2024-11-28 RX ORDER — PANTOPRAZOLE SODIUM 40 MG/1
40 TABLET, DELAYED RELEASE ORAL
Status: DISCONTINUED | OUTPATIENT
Start: 2024-11-28 | End: 2024-11-30 | Stop reason: HOSPADM

## 2024-11-28 RX ORDER — DOXEPIN HYDROCHLORIDE 25 MG/1
25 CAPSULE ORAL NIGHTLY
Status: DISCONTINUED | OUTPATIENT
Start: 2024-11-28 | End: 2024-11-30 | Stop reason: HOSPADM

## 2024-11-28 RX ORDER — FINASTERIDE 5 MG/1
5 TABLET, FILM COATED ORAL DAILY
Status: DISCONTINUED | OUTPATIENT
Start: 2024-11-28 | End: 2024-11-30 | Stop reason: HOSPADM

## 2024-11-28 RX ORDER — FERROUS GLUCONATE 324(38)MG
324 TABLET ORAL
COMMUNITY

## 2024-11-28 RX ORDER — SODIUM CHLORIDE 0.9 % (FLUSH) 0.9 %
5-40 SYRINGE (ML) INJECTION PRN
OUTPATIENT
Start: 2024-11-28

## 2024-11-28 RX ORDER — SODIUM CHLORIDE 0.9 % (FLUSH) 0.9 %
5-40 SYRINGE (ML) INJECTION PRN
Status: DISCONTINUED | OUTPATIENT
Start: 2024-11-28 | End: 2024-11-30 | Stop reason: HOSPADM

## 2024-11-28 RX ORDER — POTASSIUM CHLORIDE 1500 MG/1
40 TABLET, EXTENDED RELEASE ORAL PRN
Status: DISCONTINUED | OUTPATIENT
Start: 2024-11-28 | End: 2024-11-30 | Stop reason: HOSPADM

## 2024-11-28 RX ORDER — SODIUM CHLORIDE 9 MG/ML
INJECTION, SOLUTION INTRAVENOUS PRN
OUTPATIENT
Start: 2024-11-28

## 2024-11-28 RX ORDER — ACETAMINOPHEN 325 MG/1
650 TABLET ORAL EVERY 6 HOURS PRN
Status: DISCONTINUED | OUTPATIENT
Start: 2024-11-28 | End: 2024-11-30 | Stop reason: HOSPADM

## 2024-11-28 RX ORDER — ASPIRIN 81 MG/1
324 TABLET, CHEWABLE ORAL ONCE
Status: DISCONTINUED | OUTPATIENT
Start: 2024-11-28 | End: 2024-11-28

## 2024-11-28 RX ORDER — HEPARIN SODIUM 1000 [USP'U]/ML
4000 INJECTION, SOLUTION INTRAVENOUS; SUBCUTANEOUS ONCE
Status: COMPLETED | OUTPATIENT
Start: 2024-11-28 | End: 2024-11-28

## 2024-11-28 RX ORDER — MAGNESIUM SULFATE IN WATER 40 MG/ML
2000 INJECTION, SOLUTION INTRAVENOUS PRN
Status: DISCONTINUED | OUTPATIENT
Start: 2024-11-28 | End: 2024-11-30 | Stop reason: HOSPADM

## 2024-11-28 RX ORDER — SODIUM CHLORIDE 0.9 % (FLUSH) 0.9 %
5-40 SYRINGE (ML) INJECTION EVERY 12 HOURS SCHEDULED
Status: DISCONTINUED | OUTPATIENT
Start: 2024-11-28 | End: 2024-11-30 | Stop reason: HOSPADM

## 2024-11-28 RX ORDER — PRAVASTATIN SODIUM 40 MG
40 TABLET ORAL NIGHTLY
Status: DISCONTINUED | OUTPATIENT
Start: 2024-11-28 | End: 2024-11-30 | Stop reason: HOSPADM

## 2024-11-28 RX ORDER — HYDROCODONE BITARTRATE AND ACETAMINOPHEN 5; 325 MG/1; MG/1
1 TABLET ORAL EVERY 6 HOURS PRN
Status: DISCONTINUED | OUTPATIENT
Start: 2024-11-28 | End: 2024-11-30 | Stop reason: HOSPADM

## 2024-11-28 RX ORDER — CLOPIDOGREL BISULFATE 75 MG/1
75 TABLET ORAL DAILY
Status: DISCONTINUED | OUTPATIENT
Start: 2024-11-28 | End: 2024-11-30 | Stop reason: HOSPADM

## 2024-11-28 RX ORDER — SODIUM CHLORIDE 0.9 % (FLUSH) 0.9 %
5-40 SYRINGE (ML) INJECTION EVERY 12 HOURS SCHEDULED
OUTPATIENT
Start: 2024-11-28

## 2024-11-28 RX ORDER — ONDANSETRON 2 MG/ML
4 INJECTION INTRAMUSCULAR; INTRAVENOUS EVERY 6 HOURS PRN
Status: DISCONTINUED | OUTPATIENT
Start: 2024-11-28 | End: 2024-11-30 | Stop reason: HOSPADM

## 2024-11-28 RX ORDER — POLYETHYLENE GLYCOL 3350 17 G/17G
17 POWDER, FOR SOLUTION ORAL DAILY PRN
Status: DISCONTINUED | OUTPATIENT
Start: 2024-11-28 | End: 2024-11-30 | Stop reason: HOSPADM

## 2024-11-28 RX ORDER — ALBUTEROL SULFATE 90 UG/1
2 INHALANT RESPIRATORY (INHALATION) EVERY 4 HOURS PRN
Status: DISCONTINUED | OUTPATIENT
Start: 2024-11-28 | End: 2024-11-30 | Stop reason: HOSPADM

## 2024-11-28 RX ORDER — HEPARIN SODIUM 1000 [USP'U]/ML
2000 INJECTION, SOLUTION INTRAVENOUS; SUBCUTANEOUS PRN
Status: DISCONTINUED | OUTPATIENT
Start: 2024-11-28 | End: 2024-11-30

## 2024-11-28 RX ADMIN — HEPARIN SODIUM 2000 UNITS: 1000 INJECTION INTRAVENOUS; SUBCUTANEOUS at 16:43

## 2024-11-28 RX ADMIN — SUCRALFATE 1 G: 1 TABLET ORAL at 20:48

## 2024-11-28 RX ADMIN — DOXEPIN HYDROCHLORIDE 25 MG: 25 CAPSULE ORAL at 20:48

## 2024-11-28 RX ADMIN — TAMSULOSIN HYDROCHLORIDE 0.4 MG: 0.4 CAPSULE ORAL at 09:31

## 2024-11-28 RX ADMIN — TOPIRAMATE 100 MG: 100 TABLET, FILM COATED ORAL at 20:48

## 2024-11-28 RX ADMIN — PANTOPRAZOLE SODIUM 40 MG: 40 TABLET, DELAYED RELEASE ORAL at 09:31

## 2024-11-28 RX ADMIN — CITALOPRAM 40 MG: 40 TABLET, FILM COATED ORAL at 09:31

## 2024-11-28 RX ADMIN — SODIUM CHLORIDE, PRESERVATIVE FREE 10 ML: 5 INJECTION INTRAVENOUS at 20:48

## 2024-11-28 RX ADMIN — FINASTERIDE 5 MG: 5 TABLET, FILM COATED ORAL at 09:31

## 2024-11-28 RX ADMIN — QUETIAPINE FUMARATE 500 MG: 400 TABLET, FILM COATED ORAL at 09:31

## 2024-11-28 RX ADMIN — HEPARIN SODIUM 4000 UNITS: 1000 INJECTION INTRAVENOUS; SUBCUTANEOUS at 02:04

## 2024-11-28 RX ADMIN — HEPARIN SODIUM 12 UNITS/KG/HR: 10000 INJECTION, SOLUTION INTRAVENOUS at 02:06

## 2024-11-28 RX ADMIN — TAMSULOSIN HYDROCHLORIDE 0.4 MG: 0.4 CAPSULE ORAL at 20:48

## 2024-11-28 RX ADMIN — PRAVASTATIN SODIUM 40 MG: 40 TABLET ORAL at 20:48

## 2024-11-28 RX ADMIN — LEVOTHYROXINE SODIUM 88 MCG: 0.09 TABLET ORAL at 09:31

## 2024-11-28 RX ADMIN — TROSPIUM CHLORIDE 20 MG: 20 TABLET, FILM COATED ORAL at 09:28

## 2024-11-28 RX ADMIN — TOPIRAMATE 100 MG: 100 TABLET, FILM COATED ORAL at 09:31

## 2024-11-28 RX ADMIN — SUCRALFATE 1 G: 1 TABLET ORAL at 16:06

## 2024-11-28 RX ADMIN — TROSPIUM CHLORIDE 20 MG: 20 TABLET, FILM COATED ORAL at 16:06

## 2024-11-28 RX ADMIN — CLOPIDOGREL BISULFATE 75 MG: 75 TABLET ORAL at 09:31

## 2024-11-28 RX ADMIN — MONTELUKAST 10 MG: 10 TABLET, FILM COATED ORAL at 09:31

## 2024-11-28 RX ADMIN — SUCRALFATE 1 G: 1 TABLET ORAL at 09:31

## 2024-11-28 RX ADMIN — ASPIRIN 81 MG: 81 TABLET, CHEWABLE ORAL at 09:30

## 2024-11-28 ASSESSMENT — HEART SCORE: ECG: NON-SPECIFC REPOLARIZATION DISTURBANCE/LBTB/PM

## 2024-11-28 NOTE — ED PROVIDER NOTES
[facility] EMERGENCY DEPARTMENT - VISIT NOTE    Patient Name: Luis Enrique Valdes  MRN: [id number]  YOB: 1960  Date of Evaluation: 11/27/2024  Treating Resident Physician: Noe Elizabeth MD  Supervising Physician: Omer Cardenas DO    CHIEF COMPLAINT       Chief Complaint   Patient presents with    Chest Pain       HISTORY OF PRESENT ILLNESS    HPI    History obtained from the patient.    Luis Enrique Hobson" is a 64 y.o. old male with a history of CAD, hypertension, angina, CKD who presents to the emergency department by Ambulance for evaluation of chest pain.  Patient states that this past evening, he developed a central chest pressure radiating to his left arm that awoke him from sleep.  He took a nitroglycerin which resolved the pain and he went back to sleep.  After about 20 minutes, the pain returned.  In total, patient has received 3 sublingual nitroglycerin, 325 mg aspirin prior to arrival.  Here in the emergency department, patient denies any pain currently.  Denies any new headache, new shortness of breath, new back pain, new abdominal pain.    Chart reviewed, relevant history summarized in HPI above.      REVIEW OF SYSTEMS   Review of Systems  Negative unless documented in HPI    PAST MEDICAL AND SURGICAL HISTORY   Luis Enrique Hobson"  has a past medical history of Angina at rest ([facility]), Anxiety, Anxiety, Arthritis, Asperger's disorder, Asthma, CAD (coronary artery disease), Cervical radiculopathy, Chest pain, CHF (congestive heart failure) ([facility]), COPD (chronic obstructive pulmonary disease) ([facility]), Depression, Depression, Gall stones, GERD (gastroesophageal reflux disease), Hyperlipidemia, Hypertension, Mentally challenged, Seizures ([facility]), Thyroid disease, and Tremor.    Luis Enrique Hobson"  has a past surgical history that includes Cholecystectomy, laparoscopic; Colonoscopy; Neck surgery (1996); Knee arthroscopy (Left, 1984); Esophagus dilation; Colonoscopy; Upper gastrointestinal endoscopy; Abdomen surgery;  interpreted as COVID no longer suspected unless otherwise noted in this encounter documentation note)    EKG:   sinus bradycardia rate approximately 50      QTc 474    MEDICAL DECISION MAKING     Summary: This is a 64 y.o. old male presenting with substernal chest pressure while at rest awakening him, relieved with nitro.  This picture consistent with unstable angina.  Patient with no angina.  EKG with sinus bradycardia, initial troponin of 13, heart score of 8.  Patient currently not having pain though presentation concerning for cardiac ACS spectrum.  Patient received aspirin prior to arrival.  Initiated on heparin drip and spoken with the hospitalist who agrees with plan for admission.    ED COURSE   ED Medications administered this visit (None if left blank):   Medications   aspirin chewable tablet 324 mg (324 mg Oral Not Given 11/28/24 0003)   heparin (porcine) injection 4,000 Units (has no administration in time range)   heparin (porcine) injection 2,000 Units (has no administration in time range)   heparin 25,000 units in dextrose 5% 250 mL (premix) infusion (12 Units/kg/hr × 77.1 kg IntraVENous New Bag 11/28/24 0206)   heparin (porcine) injection 4,000 Units (4,000 Units IntraVENous Given 11/28/24 0204)            Procedures: (None if left blank)  Procedures:     Consultants:  None    Documentation:  N/A and   Heart Score    Heart Score for chest pain patients  History: Highly Suspicious  ECG: Non-Specifc repolarization disturbance/LBTB/PM  Patient Age: > 65 years  *Risk factors for Atherosclerotic disease: Hypercholesterolemia, Coronary Artery Disease, Positive family History  Risk Factors: > 3 Risk factors or history of atherosclerotic disease*  Troponin: > 1 and < 3X normal limit  Heart Score Total: 8    HEART Score recommendations:  Score 0 - 3:   <1.7%  risk of MACE over next 6 wks = Outpatient workup  Score 4 - 6: 12-16% risk of MACE over next 6 wks = Admission for observation  Score 7-  10: 50-65% risk of MACE over next 6 wks = Early invasive strategy    MACE: Major Acute Cardiac Event (All Cause Mortality, AMI or revascularization)  “Chest Pain in the Emergency Room: A Multicenter Validation of the HEART Score”. [place] BE, Kenton AJ, Russ SHAKILA, Farida TP, van jani Wall F, Farida EG, Kenneth SH, maria del carmen Mcgraw RM, Lissette PA. Crit Pathw Cardiol. 2010 Sep; 9(3): 164-169.  \"A prospective validation of the HEART score for chest pain patients at the emergency department.\" Int J Cardiol. 2013 Oct 3;168(3):2153-8. doi: 10.1016/j.ijcard.2013.01.255. Epub 2013 Mar 7.    MEDICATION CHANGES     New Prescriptions    No medications on file       FINAL IMPRESSION     Final diagnoses:   Chest pain, unspecified type       DISPOSITION   DISPOSITION Decision To Admit 11/28/2024 01:51:48 AM           Results and plan discussed with patient at bedside. Patient is agreeable to plan.         This transcription was electronically signed. Parts of this transcriptions may have been dictated by use of voice recognition software and electronically transcribed. The transcription may contain errors not detected in proofreading. Please refer to my supervising physician's documentation if my documentation differs.    Electronically Signed: Noe Elizabeth MD, 11/28/24, 2:23 AM

## 2024-11-28 NOTE — RT PROTOCOL NOTE
RT Inhaler-Nebulizer Bronchodilator Protocol Note    There is a bronchodilator order in the chart from a provider indicating to follow the RT Bronchodilator Protocol and there is an “Initiate RT Inhaler-Nebulizer Bronchodilator Protocol” order as well (see protocol at bottom of note).    CXR Findings:  XR CHEST PORTABLE    Result Date: 11/28/2024  Impression: No acute pathology. This document has been electronically signed by: Vinod Mclean MD on 11/28/2024 02:47 AM      The findings from the last RT Protocol Assessment were as follows:   History Pulmonary Disease: Chronic pulmonary disease (asthma)  Respiratory Pattern: Regular pattern and RR 12-20 bpm  Breath Sounds: Clear breath sounds  Cough: Strong, spontaneous, non-productive  Indication for Bronchodilator Therapy:    Bronchodilator Assessment Score: 2    Aerosolized bronchodilator medication orders have been revised according to the RT Inhaler-Nebulizer Bronchodilator Protocol below.    Respiratory Therapist to perform RT Therapy Protocol Assessment initially then follow the protocol.  Repeat RT Therapy Protocol Assessment PRN for score 0-3 or on second treatment, BID, and PRN for scores above 3.    No Indications - adjust the frequency to every 6 hours PRN wheezing or bronchospasm, if no treatments needed after 48 hours then discontinue using Per Protocol order mode.     If indication present, adjust the RT bronchodilator orders based on the Bronchodilator Assessment Score as indicated below.  Use Inhaler orders unless patient has one or more of the following: on home nebulizer, not able to hold breath for 10 seconds, is not alert and oriented, cannot activate and use MDI correctly, or respiratory rate 25 breaths per minute or more, then use the equivalent nebulizer order(s) with same Frequency and PRN reasons based on the score.  If a patient is on this medication at home then do not decrease Frequency below that used at home.    0-3 - enter or revise RT  bronchodilator order(s) to equivalent RT Bronchodilator order with Frequency of every 4 hours PRN for wheezing or increased work of breathing using Per Protocol order mode.        4-6 - enter or revise RT Bronchodilator order(s) to two equivalent RT bronchodilator orders with one order with BID Frequency and one order with Frequency of every 4 hours PRN wheezing or increased work of breathing using Per Protocol order mode.        7-10 - enter or revise RT Bronchodilator order(s) to two equivalent RT bronchodilator orders with one order with TID Frequency and one order with Frequency of every 4 hours PRN wheezing or increased work of breathing using Per Protocol order mode.       11-13 - enter or revise RT Bronchodilator order(s) to one equivalent RT bronchodilator order with QID Frequency and an Albuterol order with Frequency of every 4 hours PRN wheezing or increased work of breathing using Per Protocol order mode.      Greater than 13 - enter or revise RT Bronchodilator order(s) to one equivalent RT bronchodilator order with every 4 hours Frequency and an Albuterol order with Frequency of every 2 hours PRN wheezing or increased work of breathing using Per Protocol order mode.     RT to enter RT Home Evaluation for COPD & MDI Assessment order using Per Protocol order mode.    Electronically signed by Sunny Huntley RCP on 11/28/2024 at 2:13 PMRT Inhaler-Nebulizer Bronchodilator Protocol Note    There is a bronchodilator order in the chart from a provider indicating to follow the RT Bronchodilator Protocol and there is an “Initiate RT Inhaler-Nebulizer Bronchodilator Protocol” order as well (see protocol at bottom of note).    CXR Findings:  XR CHEST PORTABLE    Result Date: 11/28/2024  Impression: No acute pathology. This document has been electronically signed by: Vinod Mclean MD on 11/28/2024 02:47 AM      The findings from the last RT Protocol Assessment were as follows:   History Pulmonary Disease:       11-13 - enter or revise RT Bronchodilator order(s) to one equivalent RT bronchodilator order with QID Frequency and an Albuterol order with Frequency of every 4 hours PRN wheezing or increased work of breathing using Per Protocol order mode.      Greater than 13 - enter or revise RT Bronchodilator order(s) to one equivalent RT bronchodilator order with every 4 hours Frequency and an Albuterol order with Frequency of every 2 hours PRN wheezing or increased work of breathing using Per Protocol order mode.     RT to enter RT Home Evaluation for COPD & MDI Assessment order using Per Protocol order mode.    Electronically signed by Sunny Huntley RCP on 11/28/2024 at 2:12 PM

## 2024-11-28 NOTE — ED NOTES
ED to inpatient nurses report      Chief Complaint:  Chief Complaint   Patient presents with    Chest Pain     Present to ED from: home    MOA:     LOC: alert and orientated to name, place, date  Mobility: Independent  Oxygen Baseline: RA    Current needs required: RA     Code Status:   Prior    What abnormal results were found and what did you give/do to treat them? Heparin bolus and gtt. 324mg ASA given PTA. SL NTG x3 PTA, pain free since arrival.  Any procedures or intervention occur?     Mental Status:  Level of Consciousness: Alert (0)    Psych Assessment:        Vitals:  Patient Vitals for the past 24 hrs:   BP Temp Temp src Pulse Resp SpO2 Weight   11/28/24 0207 (!) 158/81 -- -- 52 16 97 % --   11/28/24 0115 (!) 145/72 -- -- (!) 44 -- -- --   11/27/24 2345 (!) 155/65 97.7 °F (36.5 °C) Oral 56 16 98 % 77.1 kg (170 lb)        LDAs:   Peripheral IV 11/27/24 Left Antecubital (Active)   Site Assessment Clean, dry & intact 11/27/24 2346   Line Status Brisk blood return;Flushed 11/27/24 2346       Ambulatory Status:  Presents to emergency department  because of falls (Syncope, seizure, or loss of consciousness): No, Age > 70: No, Altered Mental Status, Intoxication with alcohol or substance confusion (Disorientation, impaired judgment, poor safety awaremess, or inability to follow instructions): No, Impaired Mobility: Ambulates or transfers with assistive devices or assistance; Unable to ambulate or transer.: No, Nursing Judgement: No    Diagnosis:  DISPOSITION Decision To Admit 11/28/2024 01:51:48 AM   Final diagnoses:   Chest pain, unspecified type        Consults:  None     Pain Score:  Pain Assessment  Pain Assessment: 0-10  Pain Level: 0    C-SSRS:   Risk of Suicide: No Risk    Sepsis Screening:       Emre Fall Risk:  [place] 1 Fall Risk  Presents to emergency department  because of falls (Syncope, seizure, or loss of consciousness): No  Age > 70: No  Altered Mental Status, Intoxication with alcohol or

## 2024-11-28 NOTE — PLAN OF CARE
Plan of care.   Patient is seen at the bedside, he is awake, alert oriented X.3.  Patient states that chest pain significantly improved.  Will continue current management.  Awaiting cardiology recommendations.

## 2024-11-28 NOTE — ED PROVIDER NOTES
I performed a history and physical examination of the patient and discussed management with the resident. I reviewed the resident’s note and agree with the documented findings and plan of care. Any areas of disagreement are noted on the chart. I was personally present for the key portions of any procedures. I have documented in the chart those procedures where I was not present during the key portions. I have reviewed the emergency nurses triage note. I agree with the chief complaint, past medical history, past surgical history, allergies, medications, social and family history as documented unless otherwise noted below. Documentation of the HPI, Physical Exam and Medical Decision Making performed by medical students or scribes is based on my personal performance of the HPI, PE and MDM. For Phys Assistant/ Nurse Practitioner cases/documentation I have personally evaluated this patient and have completed at least one if not all key elements of the E/M (history, physical exam, and MDM). My findings are as noted below.    In other words, I personally saw and examined the patient I have reviewed and agreed with the resident findings including all diagnostic interpretations and treatment plans as written.  I was present for the key portion of any procedures performed and the inclusive time noted in any critical care statement.    Patient presents today for chest pain.  Patient states he has a history of angina.  Patient states that he woke up today he had chest pain he took a nitro went back to sleep and he woke up again had chest pain again so he took another Nitro.  Patient states he feels fine right now.  Patient states that he has a history of angina.  He sees Dr. Segura.  He states his last cardiac catheterization was years ago.  He states he has no stents.  Patient is otherwise resting comfortably on the cot no apparent distress no other physical complaints at this time.    EKG reveals sinus bradycardia, slight  left axis deviation, ventricular rate of 50 VT interval 148 QRS ration 132 QT interval 520, QTc of 474.    Echocardiogram:  SUMMARY:     Left ventricle:   Size was normal.   Systolic function was normal. Ejection fraction was estimated to be 55 %.   There were no regional wall motion abnormalities.   Doppler parameters were consistent with abnormal left ventricular relaxation   (grade 1 diastolic dysfunction).     Mitral valve:   There was mild to moderate regurgitation.     Tricuspid valve:   There was mild regurgitation.     Prepared and signed by     Zay Contreras MD   Signed 01-Aug-2013 14:24:25       XR CHEST PORTABLE   Final Result   Impression:   No acute pathology.      This document has been electronically signed by: Vinod Mclean MD on    11/28/2024 02:47 AM        Labs Reviewed   CBC WITH AUTO DIFFERENTIAL - Abnormal; Notable for the following components:       Result Value    RBC 3.30 (*)     Hemoglobin 10.4 (*)     Hematocrit 32.0 (*)     MCV 97.0 (*)     Eosinophils Absolute 0.6 (*)     All other components within normal limits   COMPREHENSIVE METABOLIC PANEL - Abnormal; Notable for the following components:    CO2 21 (*)     Total Bilirubin 0.2 (*)     All other components within normal limits   TROPONIN - Abnormal; Notable for the following components:    Troponin, High Sensitivity 13 (*)     All other components within normal limits   T4, FREE - Abnormal; Notable for the following components:    T4 Free 0.78 (*)     All other components within normal limits   ANTI-XA, UNFRACTIONATED HEPARIN - Abnormal; Notable for the following components:    Heparin Unfractionated 0.05 (*)     All other components within normal limits   TROPONIN - Abnormal; Notable for the following components:    Troponin, High Sensitivity 13 (*)     All other components within normal limits   TROPONIN - Abnormal; Notable for the following components:    Troponin, High Sensitivity 13 (*)     All other components

## 2024-11-28 NOTE — ED NOTES
Pt to rm 12 per EMS states he helped his brother take down and queen sized bed, then put it back together again earlier today. States he later had some substernal chest pains that would occasionally radiate to left shoulder. Took home NTG x 2 w/relief, but states it came back again so he decided to be evaluated. EMS reports giving ASA and another SL NTG en route, pt states pain free on arrival. VSS. EKG and assessment complete.

## 2024-11-28 NOTE — CONSULTS
[facility]  Cardiology Consult      Patient:  Luis Enrique Valdes  YOB: 1960    MRN: [id number]   Acct: [id number]     Primary Care Physician: Joseph Rose MD    REASON FOR CONSULT:   concern for unstable angina         CHIEF COMPLAINT:    CHEST PAIN     HISTORY OF PRESENT ILLNESS:    Luis Enrique Valdes is a pleasant 64 year old male patient with past medical history that includes:   Past Medical History:   Diagnosis Date    Angina at rest ([facility])     intermittent    Anxiety     Anxiety     Arthritis     Asperger's disorder     Asthma     CAD (coronary artery disease)     Cervical radiculopathy     Chest pain     CHF (congestive heart failure) ([facility])     COPD (chronic obstructive pulmonary disease) ([facility])     Depression     Depression     Gall stones     GERD (gastroesophageal reflux disease)     Hyperlipidemia     Hypertension     Mentally challenged     Seizures ([facility])     Thyroid disease     Tremor    Has h/o CAD, chronic angina. Echocardiogram 2022 revealed preserved EF, mild MR. Stress test 2022 was not diagnostic for ischemia, fixed perfusion defects were noted. The patient was admitted to the hospital on 11/28/2024. He presented to [facility] ER last night with chest pain. Chest pain was retrosternal, described as pressure, radiated to left arm, occurred at night (woke him up from sleep). Chest pain resolved with SL NTG and patient went back to sleep, however, it later recurred and patient decided to seek medical attention. He also reports some headache and shortness of breath. Patient denies paroxysmal nocturnal dyspnea, palpitations, dizziness, syncope, recent weight gain or leg swelling. EKG revealed sinus bradycardia, IVCD, LVH. HS Troponin 13, 13. Patient was started on Heparin gtt due to concern for ACS. Cardiology was consulted.     All labs, EKG's, diagnostic testing and images as well as cardiac cath, stress testing   were reviewed during this encounter    CARDIAC  tablet, Take 1 tablet by mouth 2 times daily as needed for Muscle spasms (Patient not taking: Reported on 11/28/2024)  ondansetron (ZOFRAN) 4 MG tablet, Take 2 tablets by mouth every 12 hours as needed  vitamin D (CHOLECALCIFEROL) 1000 UNIT TABS tablet, Take 1 tablet by mouth daily.  loratadine (CLARITIN) 10 MG tablet, Take 1 tablet by mouth daily (Patient not taking: Reported on 11/28/2024)    Allergies:    Morphine, Caffeine, Percodan [oxycodone-aspirin], Tape [adhesive tape], Zyban [bupropion hcl], Dicyclomine hcl, and Oxycodone    Social History:    reports that he has never smoked. He has never used smokeless tobacco. He reports that he does not drink alcohol and does not use drugs.    Family History:   family history includes Arthritis in his father; Asthma in his brother; Cancer in his father and mother; Depression in his father; Diabetes in his father and mother; Heart Disease in his father and mother; High Blood Pressure in his mother and sister; High Cholesterol in his mother and sister; Mental Retardation in his brother; Obesity in his brother; Substance Abuse in his father.    REVIEW OF SYSTEMS:  Constitutional: negative for anorexia, chills and fevers,weight change  Skin: negative for new skin rash per patient  HEENT: negative for head trauma or new visual changes  Respiratory: negative for cough, hemoptysis, wheezing  Cardiovascular: negative for  orthopnea, palpitations and syncope.  Gastrointestinal: negative for abdominal pain,nausea , vomiting, constipation, diarrhea.  Hematologic/lymphatic: negative for bruising,prolonged bleeding,blood clots  Musculoskeletal:negative for muscle weakness, myalgias,wasting  Neurological: negative for coordination problems, dizziness, gait problems and vertigo  Behavioral/Psych:negative for mood/sleep disturbance      PHYSICAL EXAM:   Vitals:Patient Vitals for the past 24 hrs:   BP Temp Temp src Pulse Resp SpO2 Height Weight   11/28/24 1209 138/73 98 °F (36.7 °C)  PORTABLE    Result Date: 11/28/2024  Exam: 1V chest Comparison: CR/SR - XR CHEST PORTABLE - 04/20/2024 09:30 AM EDT Findings: Mediastinum: No cardiac silhouette enlargement. Lungs: No infiltrate or mass. Pleura: No pneumothorax or significant effusion. Bones: No acute pathology. Fusion changes lower cervical spine.     Impression: No acute pathology. This document has been electronically signed by: Vinod Mclean MD on 11/28/2024 02:47 AM      LABS:  No results for input(s): \"CKTOTAL\", \"CKMB\", \"CKMBINDEX\", \"TROPONINT\" in the last 72 hours.  CBC:   Lab Results   Component Value Date/Time    WBC 5.8 11/28/2024 04:35 AM    RBC 3.70 11/28/2024 04:35 AM    RBC 4.35 11/16/2011 06:20 AM    HGB 11.7 11/28/2024 04:35 AM    HGB 14.2 11/16/2011 06:20 AM    HCT 35.8 11/28/2024 04:35 AM    MCV 96.8 11/28/2024 04:35 AM    MCH 31.6 11/28/2024 04:35 AM    MCHC 32.7 11/28/2024 04:35 AM    RDW 12.7 09/17/2024 02:30 PM     11/28/2024 04:35 AM    MPV 10.7 11/28/2024 04:35 AM     BMP:    Lab Results   Component Value Date/Time     11/28/2024 04:35 AM    K 4.9 11/28/2024 04:35 AM    K 4.1 11/28/2024 12:06 AM     11/28/2024 04:35 AM    CO2 23 11/28/2024 04:35 AM    BUN 19 11/28/2024 04:35 AM    CREATININE 1.0 11/28/2024 04:35 AM    CALCIUM 9.4 11/28/2024 04:35 AM    LABGLOM 83 11/28/2024 04:35 AM    LABGLOM 83 04/20/2024 09:25 AM    GLUCOSE 101 11/28/2024 04:35 AM    GLUCOSE 99 08/30/2024 08:01 AM     Hepatic Function Panel:    Lab Results   Component Value Date/Time    ALKPHOS 71 11/28/2024 12:06 AM    ALT 29 11/28/2024 12:06 AM    AST 37 11/28/2024 12:06 AM    BILITOT 0.2 11/28/2024 12:06 AM    BILIDIR <0.2 01/25/2024 10:00 AM     Magnesium:    Lab Results   Component Value Date/Time    MG 2.1 11/28/2024 12:06 AM     Warfarin PT/INR:  No components found for: \"PTPATWAR\", \"PTINRWAR\"  HgBA1c:    Lab Results   Component Value Date/Time    LABA1C 5.7 11/28/2024 04:35 AM     FLP:    Lab Results   Component Value

## 2024-11-28 NOTE — H&P
DIAGNOSTIC FLEXIBLE performed by Magdi Aaron MD at [facility]    TURP  2/11/2016    UPPER GASTROINTESTINAL ENDOSCOPY         Home Medications:   No current facility-administered medications on file prior to encounter.     Current Outpatient Medications on File Prior to Encounter   Medication Sig Dispense Refill    QUEtiapine Fumarate (SEROQUEL PO) Take 500 mg by mouth daily      UNABLE TO FIND Tussin      promethazine (PHENERGAN) 25 MG tablet Take 1 tablet by mouth every 6 hours as needed for Nausea      LORazepam (ATIVAN) 1 MG tablet Take 1 tablet by mouth daily. 1/2-1 daily      guaiFENesin (MUCINEX) 600 MG extended release tablet Take 1 tablet by mouth 2 times daily      finasteride (PROSCAR) 5 MG tablet Take 1 tablet by mouth daily      doxepin (SINEQUAN) 25 MG capsule Take 1 capsule by mouth nightly      solifenacin (VESICARE) 10 MG tablet Take 1 tablet by mouth daily      docusate sodium (COLACE) 100 MG capsule Take 1 capsule by mouth 2 times daily      hydrOXYzine (ATARAX) 25 MG tablet Take 1 tablet by mouth 4 times daily as needed for Itching      loperamide (IMODIUM) 2 MG capsule Take 1 capsule by mouth as needed for Diarrhea      Albuterol Sulfate (PROAIR HFA IN) Inhale 90 mcg into the lungs every 4 hours as needed      traMADol (ULTRAM) 50 MG tablet Take 1 tablet by mouth every 8 hours as needed for Pain.      calcium carbonate (TUMS) 500 MG chewable tablet Take 1 tablet by mouth as needed for Heartburn      acetaminophen (TYLENOL) 500 MG tablet Take 1 tablet by mouth every 4 hours as needed for Pain      HYDROcodone-acetaminophen (NORCO) 5-325 MG per tablet Take 1 tablet by mouth every 6 hours as needed for Pain.      aspirin 81 MG tablet Take 1 tablet by mouth daily      clopidogrel (PLAVIX) 75 MG tablet Take 1 tablet by mouth daily      SYNTHROID 88 MCG tablet Take one tablet 7 days/week with extra 1/2 tablet one day/week (Patient taking differently: Take one tablet 7 days/week with extra 1/2  tablet one day/week on Wednesday) 30 tablet 5    ipratropium-albuterol (DUONEB) 0.5-2.5 (3) MG/3ML SOLN nebulizer solution Inhale 3 mLs into the lungs every 4 hours as needed for Shortness of Breath 1 vial 0    lactobacillus acidophilus (FLORANEX) Take 1 tablet by mouth 3 times daily      ARTIFICIAL TEAR OP Apply to eye 1 drop in each eye 2-4 times per day      tamsulosin (FLOMAX) 0.4 MG capsule Take 1 capsule by mouth 2 times daily 2 tablets at bedtime 60 capsule 11    cyclobenzaprine (FLEXERIL) 10 MG tablet Take 1 tablet by mouth 2 times daily as needed for Muscle spasms      zolpidem (AMBIEN CR) 12.5 MG extended release tablet Take 10 mg by mouth nightly as needed for Sleep.       topiramate (TOPAMAX) 100 MG tablet Take 1 tablet by mouth 2 times daily      ondansetron (ZOFRAN) 4 MG tablet Take 2 tablets by mouth every 12 hours as needed      pravastatin (PRAVACHOL) 40 MG tablet Take 1 tablet by mouth nightly      vitamin D (CHOLECALCIFEROL) 1000 UNIT TABS tablet Take 1 tablet by mouth daily. 30 tablet 1    citalopram (CELEXA) 40 MG tablet Take 1 tablet by mouth daily. 30 tablet 1    nitroGLYCERIN (NITROSTAT) 0.4 MG SL tablet Place 1 tablet under the tongue every 5 minutes as needed for Chest pain. 25 tablet 1    polyethylene glycol (GLYCOLAX) packet Take 17 g by mouth 2 times daily. 1200 g 11    pantoprazole (PROTONIX) 40 MG tablet Take 1 tablet by mouth 2 times daily      sucralfate (CARAFATE) 1 GM/10ML suspension Take 10 mLs by mouth 4 times daily (before meals and nightly)      Montelukast Sodium (SINGULAIR PO)   Take 10 mg by mouth daily       benzonatate (TESSALON) 100 MG capsule Take 1 capsule by mouth daily      loratadine (CLARITIN) 10 MG tablet Take 1 tablet by mouth daily         Allergies:    Morphine, Caffeine, Percodan [oxycodone-aspirin], Tape [adhesive tape], Zyban [bupropion hcl], Dicyclomine hcl, and Oxycodone    Social History:    reports that he has never smoked. He has never used smokeless  tobacco. He reports that he does not drink alcohol and does not use drugs.    Family History:       Problem Relation Age of Onset    Cancer Mother     Diabetes Mother     Heart Disease Mother     High Blood Pressure Mother     High Cholesterol Mother     Arthritis Father     Heart Disease Father     Cancer Father     Diabetes Father     Substance Abuse Father     Depression Father     Obesity Brother     Asthma Brother     Mental Retardation Brother     High Blood Pressure Sister     High Cholesterol Sister        Diet:  No diet orders on file      Physical Exam:  BP (!) 158/81   Pulse 52   Temp 97.7 °F (36.5 °C) (Oral)   Resp 16   Wt 77.1 kg (170 lb)   SpO2 97%   BMI 23.06 kg/m²   General appearance: No apparent distress, appears stated age and cooperative.  HEENT: Normal cephalic, atraumatic without obvious deformity. Pupils equal, round, and reactive to light.  Extra ocular muscles intact. Conjunctivae/corneas clear.  Neck: Supple, with full range of motion. No jugular venous distention. Trachea midline.  Respiratory:  Normal respiratory effort. Clear to auscultation, bilaterally without Rales/Wheezes/Rhonchi.  Cardiovascular: Regular rate and rhythm with normal S1/S2 without murmurs, rubs or gallops.  Abdomen: Soft, non-tender, non-distended with normal bowel sounds.  Musculoskeletal: No clubbing, cyanosis or edema bilaterally.  Skin: Skin color pale, texture, turgor normal.  No rashes or lesions.  Neurologic:  Neurovascularly intact without any focal sensory/motor deficits. Cranial nerves: II-XII intact, grossly non-focal.  Psychiatric: Alert and oriented, thought content appropriate, normal insight  Capillary Refill: Brisk,< 3 seconds   Peripheral Pulses: +2 palpable, equal bilaterally     Data: (All radiographs, tracings, PFTs, and imaging are personally viewed and interpreted unless otherwise noted)  Labs:   Recent Labs     11/28/24  0006   WBC 5.4   HGB 10.4*   HCT 32.0*        Recent Labs      11/28/24 0006      K 4.1      CO2 21*   BUN 19   CREATININE 1.0   CALCIUM 8.8     Recent Labs     11/28/24 0006   AST 37   ALT 29   BILITOT 0.2*   ALKPHOS 71     Recent Labs     11/28/24 0006   INR 1.10     No results for input(s): \"CKTOTAL\", \"TROPONINI\" in the last 72 hours.  Urinalysis:   Lab Results   Component Value Date/Time    NITRU NEGATIVE 12/12/2019 12:11 PM    WBCUA 0-2 12/12/2019 12:11 PM    BACTERIA NONE SEEN 12/12/2019 12:11 PM    RBCUA 3-5 12/12/2019 12:11 PM    BLOODU NEGATIVE 12/12/2019 12:11 PM    GLUCOSEU NEGATIVE 12/12/2019 12:11 PM         Radiology:  XR CHEST PORTABLE   Final Result   Impression:   No acute pathology.      This document has been electronically signed by: Vinod Mclean MD on    11/28/2024 02:47 AM        XR CHEST PORTABLE    Result Date: 11/28/2024  Exam: 1V chest Comparison: CR/SR - XR CHEST PORTABLE - 04/20/2024 09:30 AM EDT Findings: Mediastinum: No cardiac silhouette enlargement. Lungs: No infiltrate or mass. Pleura: No pneumothorax or significant effusion. Bones: No acute pathology. Fusion changes lower cervical spine.     Impression: No acute pathology. This document has been electronically signed by: Vinod Mclean MD on 11/28/2024 02:47 AM            Thank you Joseph Rose MD for the opportunity to be involved in this patient's care.    Electronically signed by ELLIE Goyal CNP on 11/28/2024 at 3:07 AM

## 2024-11-28 NOTE — PROGRESS NOTES
Patient arrived per cart to 3B38. Heart monitor applied and vitals taken.  Admission paperwork completed.  Explained to patient that [facility] is not responsible for any lost or stolen items.  Patient verbalized understanding. Oriented to room and use of call light and bed controls.  Patient denies pain or needs. No signs of distress noted.  Bed locked & in low position, side-rails up x2.  Call light in reach.  Reminded patient to call nurse if any needs arise.     2 person skin assessment performed by this nurse and Flory PEMBERTON.    Explained patients right to have family, representative or physician notified of their admission.  Patient has Declined for physician to be notified.  Patient has Declined for family/representative to be notified.

## 2024-11-28 NOTE — PROCEDURES
PROCEDURE NOTE  Date: 11/28/2024   Name: Luis Enrique Valdes  YOB: 1960    Procedures        EKG was completed and handed to RN

## 2024-11-29 ENCOUNTER — APPOINTMENT (OUTPATIENT)
Age: 64
DRG: 287 | End: 2024-11-29
Payer: MEDICARE

## 2024-11-29 PROBLEM — R07.89 ATYPICAL CHEST PAIN: Status: ACTIVE | Noted: 2024-11-29

## 2024-11-29 PROBLEM — I24.9 ACUTE CORONARY SYNDROME (HCC): Status: ACTIVE | Noted: 2024-11-29

## 2024-11-29 LAB
ANION GAP SERPL CALC-SCNC: 9 MEQ/L (ref 8–16)
BASOPHILS ABSOLUTE: 0.1 THOU/MM3 (ref 0–0.1)
BASOPHILS NFR BLD AUTO: 1 %
BUN SERPL-MCNC: 18 MG/DL (ref 7–22)
CALCIUM SERPL-MCNC: 9.3 MG/DL (ref 8.5–10.5)
CHLORIDE SERPL-SCNC: 109 MEQ/L (ref 98–111)
CO2 SERPL-SCNC: 23 MEQ/L (ref 23–33)
CREAT SERPL-MCNC: 1.1 MG/DL (ref 0.4–1.2)
DEPRECATED RDW RBC AUTO: 42.8 FL (ref 35–45)
ECHO AO ASC DIAM: 3 CM
ECHO AO ASCENDING AORTA INDEX: 1.52 CM/M2
ECHO AV CUSP MM: 2 CM
ECHO AV PEAK GRADIENT: 8 MMHG
ECHO AV PEAK VELOCITY: 1.4 M/S
ECHO AV VELOCITY RATIO: 0.64
ECHO BSA: 1.97 M2
ECHO BSA: 1.97 M2
ECHO EST RA PRESSURE: 10 MMHG
ECHO LA AREA 2C: 15.4 CM2
ECHO LA AREA 4C: 17.5 CM2
ECHO LA DIAMETER INDEX: 1.83 CM/M2
ECHO LA DIAMETER: 3.6 CM
ECHO LA MAJOR AXIS: 5.3 CM
ECHO LA MINOR AXIS: 4.6 CM
ECHO LA VOL BP: 44 ML (ref 18–58)
ECHO LA VOL MOD A2C: 40 ML (ref 18–58)
ECHO LA VOL MOD A4C: 43 ML (ref 18–58)
ECHO LA VOL/BSA BIPLANE: 22 ML/M2 (ref 16–34)
ECHO LA VOLUME INDEX MOD A2C: 20 ML/M2 (ref 16–34)
ECHO LA VOLUME INDEX MOD A4C: 22 ML/M2 (ref 16–34)
ECHO LV E' LATERAL VELOCITY: 8.5 CM/S
ECHO LV E' SEPTAL VELOCITY: 5.9 CM/S
ECHO LV EJECTION FRACTION BIPLANE: 54 % (ref 55–100)
ECHO LV FRACTIONAL SHORTENING: 28 % (ref 28–44)
ECHO LV INTERNAL DIMENSION DIASTOLE INDEX: 2.18 CM/M2
ECHO LV INTERNAL DIMENSION DIASTOLIC: 4.3 CM (ref 4.2–5.9)
ECHO LV INTERNAL DIMENSION SYSTOLIC INDEX: 1.57 CM/M2
ECHO LV INTERNAL DIMENSION SYSTOLIC: 3.1 CM
ECHO LV ISOVOLUMETRIC RELAXATION TIME (IVRT): 95 MS
ECHO LV IVSD: 0.9 CM (ref 0.6–1)
ECHO LV MASS 2D: 123.3 G (ref 88–224)
ECHO LV MASS INDEX 2D: 62.6 G/M2 (ref 49–115)
ECHO LV POSTERIOR WALL DIASTOLIC: 0.9 CM (ref 0.6–1)
ECHO LV RELATIVE WALL THICKNESS RATIO: 0.42
ECHO LVOT PEAK GRADIENT: 4 MMHG
ECHO LVOT PEAK VELOCITY: 0.9 M/S
ECHO MV A VELOCITY: 0.6 M/S
ECHO MV E DECELERATION TIME (DT): 412 MS
ECHO MV E VELOCITY: 0.59 M/S
ECHO MV E/A RATIO: 0.98
ECHO MV E/E' LATERAL: 6.94
ECHO MV E/E' RATIO (AVERAGED): 8.47
ECHO MV E/E' SEPTAL: 10
ECHO MV REGURGITANT PEAK GRADIENT: 88 MMHG
ECHO MV REGURGITANT PEAK VELOCITY: 4.7 M/S
ECHO PV MAX VELOCITY: 0.6 M/S
ECHO PV PEAK GRADIENT: 2 MMHG
ECHO RIGHT VENTRICULAR SYSTOLIC PRESSURE (RVSP): 37 MMHG
ECHO RV INTERNAL DIMENSION: 3.1 CM
ECHO TV E WAVE: 0.5 M/S
ECHO TV REGURGITANT MAX VELOCITY: 2.58 M/S
ECHO TV REGURGITANT PEAK GRADIENT: 27 MMHG
EOSINOPHIL NFR BLD AUTO: 8.4 %
EOSINOPHILS ABSOLUTE: 0.4 THOU/MM3 (ref 0–0.4)
ERYTHROCYTE [DISTWIDTH] IN BLOOD BY AUTOMATED COUNT: 12.3 % (ref 11.5–14.5)
GFR SERPL CREATININE-BSD FRML MDRD: 74 ML/MIN/1.73M2
GLUCOSE SERPL-MCNC: 109 MG/DL (ref 70–108)
HCT VFR BLD AUTO: 37.4 % (ref 42–52)
HEPARIN UNFRACTIONATED: 0.25 U/ML (ref 0.3–0.7)
HEPARIN UNFRACTIONATED: 0.43 U/ML (ref 0.3–0.7)
HEPARIN UNFRACTIONATED: 0.44 U/ML (ref 0.3–0.7)
HEPARIN UNFRACTIONATED: 0.46 U/ML (ref 0.3–0.7)
HGB BLD-MCNC: 12.2 GM/DL (ref 14–18)
IMM GRANULOCYTES # BLD AUTO: 0.02 THOU/MM3 (ref 0–0.07)
IMM GRANULOCYTES NFR BLD AUTO: 0.4 %
LEFT VENTRICULAR EJECTION FRACTION MODE: NORMAL
LV EF: 55 %
LYMPHOCYTES ABSOLUTE: 1.6 THOU/MM3 (ref 1–4.8)
LYMPHOCYTES NFR BLD AUTO: 32.8 %
MAGNESIUM SERPL-MCNC: 2.2 MG/DL (ref 1.6–2.4)
MCH RBC QN AUTO: 31.4 PG (ref 26–33)
MCHC RBC AUTO-ENTMCNC: 32.6 GM/DL (ref 32.2–35.5)
MCV RBC AUTO: 96.1 FL (ref 80–94)
MONOCYTES ABSOLUTE: 0.5 THOU/MM3 (ref 0.4–1.3)
MONOCYTES NFR BLD AUTO: 9.4 %
NEUTROPHILS ABSOLUTE: 2.4 THOU/MM3 (ref 1.8–7.7)
NEUTROPHILS NFR BLD AUTO: 48 %
NRBC BLD AUTO-RTO: 0 /100 WBC
PLATELET # BLD AUTO: 181 THOU/MM3 (ref 130–400)
PMV BLD AUTO: 10.7 FL (ref 9.4–12.4)
POTASSIUM SERPL-SCNC: 4.7 MEQ/L (ref 3.5–5.2)
RBC # BLD AUTO: 3.89 MILL/MM3 (ref 4.7–6.1)
SODIUM SERPL-SCNC: 141 MEQ/L (ref 135–145)
WBC # BLD AUTO: 5 THOU/MM3 (ref 4.8–10.8)

## 2024-11-29 PROCEDURE — C1894 INTRO/SHEATH, NON-LASER: HCPCS | Performed by: INTERNAL MEDICINE

## 2024-11-29 PROCEDURE — 4A023N7 MEASUREMENT OF CARDIAC SAMPLING AND PRESSURE, LEFT HEART, PERCUTANEOUS APPROACH: ICD-10-PCS | Performed by: INTERNAL MEDICINE

## 2024-11-29 PROCEDURE — 99232 SBSQ HOSP IP/OBS MODERATE 35: CPT

## 2024-11-29 PROCEDURE — 99232 SBSQ HOSP IP/OBS MODERATE 35: CPT | Performed by: PHYSICIAN ASSISTANT

## 2024-11-29 PROCEDURE — 85025 COMPLETE CBC W/AUTO DIFF WBC: CPT

## 2024-11-29 PROCEDURE — 6370000000 HC RX 637 (ALT 250 FOR IP)

## 2024-11-29 PROCEDURE — 96366 THER/PROPH/DIAG IV INF ADDON: CPT

## 2024-11-29 PROCEDURE — 93458 L HRT ARTERY/VENTRICLE ANGIO: CPT | Performed by: INTERNAL MEDICINE

## 2024-11-29 PROCEDURE — 83735 ASSAY OF MAGNESIUM: CPT

## 2024-11-29 PROCEDURE — 2709999900 HC NON-CHARGEABLE SUPPLY: Performed by: INTERNAL MEDICINE

## 2024-11-29 PROCEDURE — C1769 GUIDE WIRE: HCPCS | Performed by: INTERNAL MEDICINE

## 2024-11-29 PROCEDURE — 85520 HEPARIN ASSAY: CPT

## 2024-11-29 PROCEDURE — 80048 BASIC METABOLIC PNL TOTAL CA: CPT

## 2024-11-29 PROCEDURE — 99152 MOD SED SAME PHYS/QHP 5/>YRS: CPT | Performed by: INTERNAL MEDICINE

## 2024-11-29 PROCEDURE — 93306 TTE W/DOPPLER COMPLETE: CPT

## 2024-11-29 PROCEDURE — 6360000002 HC RX W HCPCS: Performed by: INTERNAL MEDICINE

## 2024-11-29 PROCEDURE — 36415 COLL VENOUS BLD VENIPUNCTURE: CPT

## 2024-11-29 PROCEDURE — 2500000003 HC RX 250 WO HCPCS: Performed by: INTERNAL MEDICINE

## 2024-11-29 PROCEDURE — 6360000004 HC RX CONTRAST MEDICATION: Performed by: INTERNAL MEDICINE

## 2024-11-29 PROCEDURE — B2151ZZ FLUOROSCOPY OF LEFT HEART USING LOW OSMOLAR CONTRAST: ICD-10-PCS | Performed by: INTERNAL MEDICINE

## 2024-11-29 PROCEDURE — B2111ZZ FLUOROSCOPY OF MULTIPLE CORONARY ARTERIES USING LOW OSMOLAR CONTRAST: ICD-10-PCS | Performed by: INTERNAL MEDICINE

## 2024-11-29 PROCEDURE — 93306 TTE W/DOPPLER COMPLETE: CPT | Performed by: INTERNAL MEDICINE

## 2024-11-29 PROCEDURE — 6360000002 HC RX W HCPCS

## 2024-11-29 PROCEDURE — 2580000003 HC RX 258

## 2024-11-29 PROCEDURE — 2140000000 HC CCU INTERMEDIATE R&B

## 2024-11-29 RX ORDER — FENTANYL CITRATE 50 UG/ML
INJECTION, SOLUTION INTRAMUSCULAR; INTRAVENOUS PRN
Status: DISCONTINUED | OUTPATIENT
Start: 2024-11-29 | End: 2024-11-29 | Stop reason: HOSPADM

## 2024-11-29 RX ORDER — QUETIAPINE 300 MG/1
300 TABLET, FILM COATED, EXTENDED RELEASE ORAL NIGHTLY
Status: ON HOLD | COMMUNITY
End: 2024-11-30 | Stop reason: HOSPADM

## 2024-11-29 RX ORDER — IOPAMIDOL 755 MG/ML
INJECTION, SOLUTION INTRAVASCULAR PRN
Status: DISCONTINUED | OUTPATIENT
Start: 2024-11-29 | End: 2024-11-29 | Stop reason: HOSPADM

## 2024-11-29 RX ORDER — ZOLPIDEM TARTRATE 10 MG/1
10 TABLET ORAL NIGHTLY PRN
Status: ON HOLD | COMMUNITY
End: 2024-11-30 | Stop reason: HOSPADM

## 2024-11-29 RX ORDER — QUETIAPINE 200 MG/1
200 TABLET, FILM COATED, EXTENDED RELEASE ORAL NIGHTLY
Status: ON HOLD | COMMUNITY
End: 2024-11-30 | Stop reason: HOSPADM

## 2024-11-29 RX ORDER — SUCRALFATE 1 G/1
1 TABLET ORAL
COMMUNITY

## 2024-11-29 RX ORDER — LEVOTHYROXINE SODIUM 88 UG/1
132 TABLET ORAL
Status: DISCONTINUED | OUTPATIENT
Start: 2024-12-04 | End: 2024-11-30 | Stop reason: HOSPADM

## 2024-11-29 RX ORDER — TAMSULOSIN HYDROCHLORIDE 0.4 MG/1
0.8 CAPSULE ORAL NIGHTLY
COMMUNITY

## 2024-11-29 RX ORDER — LEVOTHYROXINE SODIUM 88 UG/1
88 TABLET ORAL
Status: DISCONTINUED | OUTPATIENT
Start: 2024-11-30 | End: 2024-11-30 | Stop reason: HOSPADM

## 2024-11-29 RX ORDER — MIDAZOLAM HYDROCHLORIDE 1 MG/ML
INJECTION, SOLUTION INTRAMUSCULAR; INTRAVENOUS PRN
Status: DISCONTINUED | OUTPATIENT
Start: 2024-11-29 | End: 2024-11-29 | Stop reason: HOSPADM

## 2024-11-29 RX ADMIN — CLOPIDOGREL BISULFATE 75 MG: 75 TABLET ORAL at 08:28

## 2024-11-29 RX ADMIN — TAMSULOSIN HYDROCHLORIDE 0.4 MG: 0.4 CAPSULE ORAL at 20:12

## 2024-11-29 RX ADMIN — ASPIRIN 81 MG: 81 TABLET, CHEWABLE ORAL at 08:27

## 2024-11-29 RX ADMIN — CITALOPRAM 40 MG: 40 TABLET, FILM COATED ORAL at 08:27

## 2024-11-29 RX ADMIN — DOXEPIN HYDROCHLORIDE 25 MG: 25 CAPSULE ORAL at 20:12

## 2024-11-29 RX ADMIN — FINASTERIDE 5 MG: 5 TABLET, FILM COATED ORAL at 08:29

## 2024-11-29 RX ADMIN — HEPARIN SODIUM 14 UNITS/KG/HR: 10000 INJECTION, SOLUTION INTRAVENOUS at 01:13

## 2024-11-29 RX ADMIN — TAMSULOSIN HYDROCHLORIDE 0.4 MG: 0.4 CAPSULE ORAL at 08:28

## 2024-11-29 RX ADMIN — PRAVASTATIN SODIUM 40 MG: 40 TABLET ORAL at 20:12

## 2024-11-29 RX ADMIN — TOPIRAMATE 100 MG: 100 TABLET, FILM COATED ORAL at 08:28

## 2024-11-29 RX ADMIN — PANTOPRAZOLE SODIUM 40 MG: 40 TABLET, DELAYED RELEASE ORAL at 08:28

## 2024-11-29 RX ADMIN — SUCRALFATE 1 G: 1 TABLET ORAL at 20:12

## 2024-11-29 RX ADMIN — TOPIRAMATE 100 MG: 100 TABLET, FILM COATED ORAL at 20:12

## 2024-11-29 RX ADMIN — TROSPIUM CHLORIDE 20 MG: 20 TABLET, FILM COATED ORAL at 18:56

## 2024-11-29 RX ADMIN — QUETIAPINE FUMARATE 500 MG: 400 TABLET, FILM COATED ORAL at 08:28

## 2024-11-29 RX ADMIN — MONTELUKAST 10 MG: 10 TABLET, FILM COATED ORAL at 08:29

## 2024-11-29 RX ADMIN — LEVOTHYROXINE SODIUM 88 MCG: 0.09 TABLET ORAL at 08:29

## 2024-11-29 RX ADMIN — SUCRALFATE 1 G: 1 TABLET ORAL at 18:56

## 2024-11-29 RX ADMIN — SODIUM CHLORIDE, PRESERVATIVE FREE 10 ML: 5 INJECTION INTRAVENOUS at 20:13

## 2024-11-29 RX ADMIN — TROSPIUM CHLORIDE 20 MG: 20 TABLET, FILM COATED ORAL at 08:28

## 2024-11-29 RX ADMIN — SODIUM CHLORIDE, PRESERVATIVE FREE 10 ML: 5 INJECTION INTRAVENOUS at 08:29

## 2024-11-29 NOTE — H&P
ELLIE - CNP, 25 mg at 11/28/24 2048    finasteride (PROSCAR) tablet 5 mg, 5 mg, Oral, Daily, Geraldine Renner APRN - CNP, 5 mg at 11/29/24 0829    HYDROcodone-acetaminophen (NORCO) 5-325 MG per tablet 1 tablet, 1 tablet, Oral, Q6H PRN, Geraldine Renner APRN - CNP    ipratropium 0.5 mg-albuterol 2.5 mg (DUONEB) nebulizer solution 1 Dose, 1 Dose, Inhalation, Q4H PRN, Geraldine Renner APRN - CNP    montelukast (SINGULAIR) tablet 10 mg, 10 mg, Oral, Daily, Geraldine Renner APRN - CNP, 10 mg at 11/29/24 0829    pantoprazole (PROTONIX) tablet 40 mg, 40 mg, Oral, Daily with breakfast, Geraldine Renner APRN - CNP, 40 mg at 11/29/24 0828    pravastatin (PRAVACHOL) tablet 40 mg, 40 mg, Oral, Nightly, Geraldine Renner APRN - CNP, 40 mg at 11/28/24 2048    QUEtiapine (SEROQUEL) tablet 500 mg, 500 mg, Oral, Daily, Geraldine Renner APRN - CNP, 500 mg at 11/29/24 0828    trospium (SANCTURA) tablet 20 mg, 20 mg, Oral, BID AC, Geraldine Renner APRN - CNP, 20 mg at 11/29/24 0828    sucralfate (CARAFATE) tablet 1 g, 1 g, Oral, 4x Daily AC & HS, Geraldine Renner APRN - CNP, 1 g at 11/28/24 2048    tamsulosin (FLOMAX) capsule 0.4 mg, 0.4 mg, Oral, BID, Geraldine Renner APRN - CNP, 0.4 mg at 11/29/24 0828    topiramate (TOPAMAX) tablet 100 mg, 100 mg, Oral, BID, Geraldine Renner APRN - CNP, 100 mg at 11/29/24 0828  Prior to Admission medications    Medication Sig Start Date End Date Taking? Authorizing Provider   QUEtiapine (SEROQUEL XR) 200 MG extended release tablet Take 1 tablet by mouth nightly Total dose = 500 mg   Yes Glendy Mena MD   QUEtiapine (SEROQUEL XR) 300 MG extended release tablet Take 1 tablet by mouth nightly Total dose = 500 mg   Yes Glendy Mena MD   sucralfate (CARAFATE) 1 GM tablet Take 1 tablet by mouth 4 times daily (before meals and nightly)   Yes Glendy Mena MD   tamsulosin (FLOMAX) 0.4 MG capsule Take 2 capsules by mouth at bedtime   Yes Provider  MD Glendy   zolpidem (AMBIEN) 10 MG tablet Take 1 tablet by mouth nightly as needed for Sleep. Max Daily Amount: 10 mg   Yes ProviderGlendy MD   ferrous gluconate (FERGON) 324 (38 Fe) MG tablet Take 1 tablet by mouth daily (with breakfast)   Yes ProviderGlendy MD   guaiFENesin (MUCINEX) 600 MG extended release tablet Take 1 tablet by mouth 2 times daily   Yes ProviderGlendy MD   finasteride (PROSCAR) 5 MG tablet Take 1 tablet by mouth daily   Yes ProviderGlendy MD   doxepin (SINEQUAN) 25 MG capsule Take 1 capsule by mouth nightly   Yes ProviderGlendy MD   solifenacin (VESICARE) 10 MG tablet Take 1 tablet by mouth daily   Yes ProviderGlendy MD   docusate sodium (COLACE) 100 MG capsule Take 1 capsule by mouth 2 times daily   Yes ProviderGlendy MD   HYDROcodone-acetaminophen (NORCO) 5-325 MG per tablet Take 1 tablet by mouth every 6 hours as needed for Pain.   Yes ProviderGlendy MD   aspirin 81 MG tablet Take 1 tablet by mouth daily   Yes Provider, MD Glendy   clopidogrel (PLAVIX) 75 MG tablet Take 1 tablet by mouth daily   Yes ProviderGlendy MD   SYNTHROID 88 MCG tablet Take one tablet 7 days/week with extra 1/2 tablet one day/week  Patient taking differently: Take 1 tablet by mouth Daily Take one tablet 7 days/week with extra 1/2 tablet one day/week (Wednesday) 1/19/16  Yes Grover Wells MD   lactobacillus acidophilus (FLORANEX) Take 1 tablet by mouth 3 times daily   Yes ProviderGlendy MD   topiramate (TOPAMAX) 100 MG tablet Take 1 tablet by mouth 2 times daily   Yes Provider, MD Glendy   pravastatin (PRAVACHOL) 40 MG tablet Take 1 tablet by mouth nightly   Yes ProviderGlendy MD   citalopram (CELEXA) 40 MG tablet Take 1 tablet by mouth daily. 1/7/13  Yes Jordin Schwartz MD   nitroGLYCERIN (NITROSTAT) 0.4 MG SL tablet Place 1 tablet under the tongue every 5 minutes as needed for Chest pain. 1/7/13  Yes

## 2024-11-29 NOTE — PROGRESS NOTES
Hospitalist Progress Note    Patient:  Luis Enrique Valdes      Unit/Bed:3B-38/038-A    YOB: 1960    MRN: [id number]       Acct: [id number]     PCP: Joseph Rose MD    Date of Admission: 11/27/2024    Assessment/Plan:    ACS.  Unstable angina versus NSTEMI.  -On ACS protocol.  Heparin drip.  Full dose aspirin/statin started on admission.  -Undergoing to [facility].  -Awaiting further cardiology conditions.  -Daily CBC BMP.  Daily magnesium.  -Will continue close monitoring, daily weights LICHA's.  Fall/aspiration precautions.  CHF HFpEF.  Diastolic deficiency.  Clinically looks euvolemic.  Will continue monitoring.  Last echo was dated in 2022.  With a mild mitral regurg.  Mild mitral regurg.  Based on echo findings, unchanged for the past 2 years.  Deconditioning.  Due to multiple comorbidities.  PT OT.    Expected discharge date: 1-2 days    Disposition:    [x] Home       [] TCU       [] Rehab       [] Psych       [] SNF       [] Long Term Care Facility       [] Other-    Chief Complaint: Chest pain    Hospital Course: Luis Enrique Valdes is a 64 y.o. male with PMHx of Angina, CHF, COPD who presented to [facility] with chief complaint of chest pain. Reports mid-sternal chest pressure radiating into his left shoulder. Reports a history of angina for which he follows with Dr. Bethea as OP. States he took a Nitro and it relieved the pain for awhile but he had to take another later on as the chest pain returned. Reports associated headache, SOB and nausea with chest pain. Pain relieved with the second Nitro but patient thought he should come in for evaluation. Reports having some nerves burned in his neck this past Wednesday (11/20) by Dr. Mg with pain management in [place]. Reports having to take an extra Norco due to this pain. Denies any other symptoms. Denies dizziness, or lightheadedness. Denies fever or chills. Denies any recent illness. Denies abdominal pain, constipation or diarrhea. Denies dysuria or  hematuria.       Subjective (past 24 hours): Patient seen cardiology.  Patient is going to [facility] today.  Awaiting further recommendations from cardiology which will be based on [facility] results today.      ROS (12 point review of systems completed.  Pertinent positives noted. Otherwise ROS is negative).    Medications:  Reviewed    Infusion Medications    heparin (PORCINE) Infusion 14 Units/kg/hr (11/29/24 0113)    sodium chloride       Scheduled Medications    [START ON 11/30/2024] levothyroxine  88 mcg Oral Once per day on Sunday Monday Tuesday Thursday Friday Saturday    [START ON 12/4/2024] levothyroxine  132 mcg Oral Every Wednesday    sodium chloride flush  5-40 mL IntraVENous 2 times per day    aspirin  81 mg Oral Daily    citalopram  40 mg Oral Daily    clopidogrel  75 mg Oral Daily    doxepin  25 mg Oral Nightly    finasteride  5 mg Oral Daily    montelukast  10 mg Oral Daily    pantoprazole  40 mg Oral Daily with breakfast    pravastatin  40 mg Oral Nightly    QUEtiapine  500 mg Oral Daily    trospium  20 mg Oral BID AC    sucralfate  1 g Oral 4x Daily AC & HS    tamsulosin  0.4 mg Oral BID    topiramate  100 mg Oral BID     PRN Meds: heparin (porcine), heparin (porcine), sodium chloride flush, sodium chloride, potassium chloride **OR** potassium alternative oral replacement **OR** potassium chloride, magnesium sulfate, ondansetron **OR** ondansetron, acetaminophen **OR** acetaminophen, polyethylene glycol, albuterol sulfate HFA, HYDROcodone-acetaminophen, ipratropium 0.5 mg-albuterol 2.5 mg      Intake/Output Summary (Last 24 hours) at 11/29/2024 1514  Last data filed at 11/28/2024 1648  Gross per 24 hour   Intake --   Output 175 ml   Net -175 ml       Diet:  ADULT DIET; Regular; Low Fat/Low Chol/High Fiber/2 gm Na    Exam:  BP (!) 141/77   Pulse 54   Temp 98 °F (36.7 °C) (Oral)   Resp 16   Ht 1.803 m (5' 11\")   Wt 77.5 kg (170 lb 14.4 oz)   SpO2 94%   BMI 23.84 kg/m²     General appearance: No apparent           DVT prophylaxis: [] Lovenox                                 [] SCDs                                 [] SQ Heparin                                 [] Encourage ambulation           [x] Already on Anticoagulation     Code Status: Full Code    PT/OT Eval Status:     Tele:   [x] yes             [] no    Electronically signed by Lindsay Hughes DO on 11/29/2024 at 3:14 PM

## 2024-11-29 NOTE — PROGRESS NOTES
Cardiology Progress Note      Patient:  Luis Enrique Valdes  YOB: 1960  MRN: [id number]   Acct: [id number]  Admit Date:  11/27/2024  Primary Cardiologist: Umair Segura MD    Note per dr noguera \"REASON FOR CONSULT:   concern for unstable angina          CHIEF COMPLAINT:    CHEST PAIN      HISTORY OF PRESENT ILLNESS:    Luis Enrique Valdes is a pleasant 64 year old male patient with past medical history that includes:   Past Medical History        Past Medical History:   Diagnosis Date    Angina at rest (HCC)       intermittent    Anxiety      Anxiety      Arthritis      Asperger's disorder      Asthma      CAD (coronary artery disease)      Cervical radiculopathy      Chest pain      CHF (congestive heart failure) (HCC)      COPD (chronic obstructive pulmonary disease) (HCC)      Depression      Depression      Gall stones      GERD (gastroesophageal reflux disease)      Hyperlipidemia      Hypertension      Mentally challenged      Seizures (HCC)      Thyroid disease      Tremor        Has h/o CAD, chronic angina. Echocardiogram 2022 revealed preserved EF, mild MR. Stress test 2022 was not diagnostic for ischemia, fixed perfusion defects were noted. The patient was admitted to the hospital on 11/28/2024. He presented to [facility] ER last night with chest pain. Chest pain was retrosternal, described as pressure, radiated to left arm, occurred at night (woke him up from sleep). Chest pain resolved with SL NTG and patient went back to sleep, however, it later recurred and patient decided to seek medical attention. He also reports some headache and shortness of breath. Patient denies paroxysmal nocturnal dyspnea, palpitations, dizziness, syncope, recent weight gain or leg swelling. EKG revealed sinus bradycardia, IVCD, LVH. HS Troponin 13, 13. Patient was started on Heparin gtt due to concern for ACS. Cardiology was consulted. \"    Subjective (Events in last 24 hours): pt awake and alert.  NAD. No cp or sob. No  edema or orthopnea.  No complaints  On RA  On heparin gtt      Objective:   BP (!) 141/77   Pulse 54   Temp 98 °F (36.7 °C) (Oral)   Resp 16   Ht 1.803 m (5' 11\")   Wt 77.5 kg (170 lb 14.4 oz)   SpO2 94%   BMI 23.84 kg/m²        TELEMETRY: nsr    Physical Exam:  General Appearance: alert and oriented to person, place and time, in no acute distress  Cardiovascular: normal rate, regular rhythm, normal S1 and S2, no murmurs, rubs, clicks, or gallops, distal pulses intact, no carotid bruits, no JVD  Pulmonary/Chest: clear to auscultation bilaterally- no wheezes, rales or rhonchi, normal air movement, no respiratory distress  Abdomen: soft, non-tender, non-distended, normal bowel sounds, no masses Extremities: no cyanosis, clubbing or edema, pulse   Skin: warm and dry, no rash or erythema  Head: normocephalic and atraumatic  Eyes: pupils equal, round, and reactive to light  Neck: supple and non-tender without mass, no thyromegaly       Medications:    sodium chloride flush  5-40 mL IntraVENous 2 times per day    aspirin  81 mg Oral Daily    citalopram  40 mg Oral Daily    clopidogrel  75 mg Oral Daily    doxepin  25 mg Oral Nightly    finasteride  5 mg Oral Daily    montelukast  10 mg Oral Daily    pantoprazole  40 mg Oral Daily with breakfast    pravastatin  40 mg Oral Nightly    QUEtiapine  500 mg Oral Daily    trospium  20 mg Oral BID AC    sucralfate  1 g Oral 4x Daily AC & HS    levothyroxine  88 mcg Oral Daily    tamsulosin  0.4 mg Oral BID    topiramate  100 mg Oral BID      heparin (PORCINE) Infusion 14 Units/kg/hr (11/29/24 0113)    sodium chloride       heparin (porcine), 4,000 Units, PRN  heparin (porcine), 2,000 Units, PRN  sodium chloride flush, 5-40 mL, PRN  sodium chloride, , PRN  potassium chloride, 40 mEq, PRN   Or  potassium alternative oral replacement, 40 mEq, PRN   Or  potassium chloride, 10 mEq, PRN  magnesium sulfate, 2,000 mg, PRN  ondansetron, 4 mg, Q8H PRN   Or  ondansetron, 4 mg, Q6H

## 2024-11-29 NOTE — CARE COORDINATION
Case Management Assessment Initial Evaluation    Date/Time of Evaluation: 2024 7:53 AM  Assessment Completed by: Erica Mitchell RN    If patient is discharged prior to next notation, then this note serves as note for discharge by case management.    Patient Name: Luis Enrique Valdes                   YOB: 1960  Diagnosis: Unstable angina (HCC) [I20.0]  Chest pain, unspecified type [R07.9]                   Date / Time: 2024 11:40 PM  Location: [address] [place] [address]     Patient Admission Status: Observation   Readmission Risk Low 0-14, Mod 15-19), High > 20: Readmission Risk Score: 14.6    Current PCP: Joseph Rose MD  Health Care Decision Makers:   Primary Decision Maker: Adela Small - Brother/Sister - [phone]    Primary Decision Maker: Johana Banks - Brother/Sister - [phone]    Additional Case Management Notes: Admitted through ED as observation with chest pain. Troponins 13. Heparin gtt. Consulted Cardiology. Planning Echo and LHC today.     Procedures:    Echo: ordered    LHC with Dr. French: planned.     Imagin/28 PCXR: Nothing acute.     Patient Goals/Plan/Treatment Preferences: Spoke with pt. He lives at [facility]. Plans to return. SW consulted.        24 1033   Service Assessment   Patient Orientation Alert and Oriented   Cognition Alert   History Provided By Patient   Primary Caregiver Self   Accompanied By/Relationship n/a   Support Systems Family Members;Friends/Neighbors;Other (Comment)  ([facility])   Patient's Healthcare Decision Maker is: Legal Next of Kin   PCP Verified by CM Yes   Last Visit to PCP Within last 3 months   Prior Functional Level Assistance with the following:;Cooking;Housework;Shopping;Other (see comment)  (Meds)   Current Functional Level Assistance with the following:;Cooking;Housework;Shopping;Other (see comment)  (Meds)   Can patient return to prior living arrangement Yes   Ability to make needs known: Good    Family able to assist with home care needs: Yes   Would you like for me to discuss the discharge plan with any other family members/significant others, and if so, who? Yes  (Adela sister)   Financial Resources Medicare;Medicaid   Community Resources Assisted Living;Other (Comment)  ([facility]; Pain mgmt [facility])   Social/Functional History   Active  No   Patient's  Info COA or family   Discharge Planning   Type of Residence Assisted living  ([facility])   Living Arrangements Other (Comment)  ([facility])   Current Services Prior To Admission Durable Medical Equipment;Other (Comment)  ([facility])   Current DME Prior to Arrival Home Aerosol;Cane   Potential Assistance Needed N/A   DME Ordered? No   Potential Assistance Purchasing Medications No   Type of Home Care Services None   Patient expects to be discharged to: Assisted living  ([facility])   Services At/After Discharge   Mode of Transport at Discharge Other (see comment)  (family)   Confirm Follow Up Transport Other (see comment)  ([facility])   Condition of Participation: Discharge Planning   The Plan for Transition of Care is related to the following treatment goals: \"get rid of the pain and figure out why it's happening\"

## 2024-11-29 NOTE — CARE COORDINATION
11/29/24, 2:28 PM EST  Discharge Planning Evaluation  Social work consult received, patient from Assisted Living .   Patient's preference is to return to [facility].    Would patient be willing to go to a skilled facility if needed: No need at this time.  Is there skilled care available at current facility: No.  Barriers to return to current living situation: none  Spoke with Flakito at the facility.  Patient bed hold: yes  Anticipated transport plan: He told SW that his sister Adela can transport him.  Patient's Healthcare Decision Maker: Legal Next of Kin  11/29/24, 2:30 PM EST    Patient goals/plan/ treatment preferences discussed by  and .  Patient goals/plan/ treatment preferences reviewed with patient/ family.  Patient/ family verbalize understanding of discharge plan and are in agreement with goal/plan/treatment preferences.  Understanding was demonstrated using the teach back method.  AVS provided by RN at time of discharge, which includes all necessary medical information pertaining to the patients current course of illness, treatment, post-discharge goals of care, and treatment preferences.     Services At/After Discharge: Assisted Living       Maurice is a potential discharge for the weekend.  He will return to [facility].  He told SW that his sister can transport him back to the facility.  He told SW that he does not need any therapy at discharge.

## 2024-11-29 NOTE — PROGRESS NOTES
Pharmacy Medication History Note    List of current medications patient is taking is complete.    Source of information: patient, epic fill history    Changes made to medication list:  Medications removed (include reason, ex. therapy complete or physician discontinued):  Albuterol - therapy complete  Cyclobenzaprine - therapy complete  Hydroxyzine - therapy complete  Duoneb - therapy complete  Loperamide - therapy complete  Loratadine - therapy complete  Lorazepam - therapy complete  Promethazine - therapy complete  quetiapine- no strength  Ondansetron - therapy complete  Sucralfate suspension - alternate therapy  Tamsulosin - alternate therapy  Tramadol - therapy completed  Zolpidem 12.5mg - alternate therapy  Artificial tears - therapy complete    Medications added/doses adjusted:  Quetiapine ER 200mg  Quetiapine ER 300mg  - total dose = 500mg nightly  Corrected pantoprazole to daily  Sucralfate 1 gram tabs   Tamsulosin 0.4 mg - 2 tabs nightly  Zolidem 10mg nightly as needed    Other notes (ex. Recent course of antibiotics, Coumadin dosing):  Denies use of other OTC or herbal medications.    Allergies reviewed    Electronically signed by Jes Ya RPH on 11/29/2024 at 1:46 PM

## 2024-11-30 VITALS
HEIGHT: 71 IN | DIASTOLIC BLOOD PRESSURE: 70 MMHG | TEMPERATURE: 97.2 F | HEART RATE: 58 BPM | RESPIRATION RATE: 18 BRPM | WEIGHT: 170.9 LBS | OXYGEN SATURATION: 95 % | BODY MASS INDEX: 23.93 KG/M2 | SYSTOLIC BLOOD PRESSURE: 126 MMHG

## 2024-11-30 LAB
ANION GAP SERPL CALC-SCNC: 11 MEQ/L (ref 8–16)
BUN SERPL-MCNC: 20 MG/DL (ref 7–22)
CALCIUM SERPL-MCNC: 9 MG/DL (ref 8.5–10.5)
CHLORIDE SERPL-SCNC: 110 MEQ/L (ref 98–111)
CO2 SERPL-SCNC: 21 MEQ/L (ref 23–33)
CREAT SERPL-MCNC: 1.1 MG/DL (ref 0.4–1.2)
GFR SERPL CREATININE-BSD FRML MDRD: 74 ML/MIN/1.73M2
GLUCOSE SERPL-MCNC: 80 MG/DL (ref 70–108)
POTASSIUM SERPL-SCNC: 4.1 MEQ/L (ref 3.5–5.2)
SODIUM SERPL-SCNC: 142 MEQ/L (ref 135–145)

## 2024-11-30 PROCEDURE — 6370000000 HC RX 637 (ALT 250 FOR IP)

## 2024-11-30 PROCEDURE — 99232 SBSQ HOSP IP/OBS MODERATE 35: CPT | Performed by: PHYSICIAN ASSISTANT

## 2024-11-30 PROCEDURE — 36415 COLL VENOUS BLD VENIPUNCTURE: CPT

## 2024-11-30 PROCEDURE — 2580000003 HC RX 258

## 2024-11-30 PROCEDURE — 99239 HOSP IP/OBS DSCHRG MGMT >30: CPT | Performed by: INTERNAL MEDICINE

## 2024-11-30 PROCEDURE — 80048 BASIC METABOLIC PNL TOTAL CA: CPT

## 2024-11-30 RX ORDER — QUETIAPINE FUMARATE 100 MG/1
500 TABLET, FILM COATED ORAL DAILY
Qty: 60 TABLET | Refills: 0
Start: 2024-12-01

## 2024-11-30 RX ADMIN — MONTELUKAST 10 MG: 10 TABLET, FILM COATED ORAL at 08:10

## 2024-11-30 RX ADMIN — SUCRALFATE 1 G: 1 TABLET ORAL at 08:10

## 2024-11-30 RX ADMIN — ASPIRIN 81 MG: 81 TABLET, CHEWABLE ORAL at 08:10

## 2024-11-30 RX ADMIN — QUETIAPINE FUMARATE 500 MG: 400 TABLET, FILM COATED ORAL at 08:10

## 2024-11-30 RX ADMIN — LEVOTHYROXINE SODIUM 88 MCG: 0.09 TABLET ORAL at 08:11

## 2024-11-30 RX ADMIN — FINASTERIDE 5 MG: 5 TABLET, FILM COATED ORAL at 08:12

## 2024-11-30 RX ADMIN — TOPIRAMATE 100 MG: 100 TABLET, FILM COATED ORAL at 08:12

## 2024-11-30 RX ADMIN — CITALOPRAM 40 MG: 40 TABLET, FILM COATED ORAL at 08:12

## 2024-11-30 RX ADMIN — CLOPIDOGREL BISULFATE 75 MG: 75 TABLET ORAL at 08:10

## 2024-11-30 RX ADMIN — TROSPIUM CHLORIDE 20 MG: 20 TABLET, FILM COATED ORAL at 08:10

## 2024-11-30 RX ADMIN — SODIUM CHLORIDE, PRESERVATIVE FREE 10 ML: 5 INJECTION INTRAVENOUS at 08:12

## 2024-11-30 RX ADMIN — PANTOPRAZOLE SODIUM 40 MG: 40 TABLET, DELAYED RELEASE ORAL at 08:10

## 2024-11-30 RX ADMIN — TAMSULOSIN HYDROCHLORIDE 0.4 MG: 0.4 CAPSULE ORAL at 08:11

## 2024-11-30 NOTE — DISCHARGE INSTRUCTIONS
Discharge Instructions for Radial Heart Catherization    1.  Take it easy for 3-4 days.  2.  No driving for 2 days.  3.  No lifting of 5 lbs or more for 5 days with the affected arm.  4.  Can shower after 24 hours.  5.  Remove arm board after 24 hours.  6.  Apply a band aid to the insertion site daily for 5 days.  May apply antibiotic ointment if desired, but not necessary.  Wash site daily with soap and water.  7.  No creams, ointments, or powders near the insertion site.   8.  No tub baths, swimming, hot tubs, or hand washing dishes for 1 week.  9.  Watch for signs of infection (redness, warmth, swelling, or pus drainage) or coolness of extremity and call physician if this occurs  10.  If bleeding occurs from insertion site, apply pressure and call 911.

## 2024-11-30 NOTE — PROGRESS NOTES
Cardiology Progress Note      Patient:  Luis Enrique Valdes  YOB: 1960  MRN: [id number]   Acct: [id number]  Admit Date:  11/27/2024  Primary Cardiologist: Umair Segura MD    Note per dr noguera \"REASON FOR CONSULT:   concern for unstable angina          CHIEF COMPLAINT:    CHEST PAIN      HISTORY OF PRESENT ILLNESS:    Luis Enrique Valdes is a pleasant 64 year old male patient with past medical history that includes:   Past Medical History        Past Medical History:   Diagnosis Date    Angina at rest (HCC)       intermittent    Anxiety      Anxiety      Arthritis      Asperger's disorder      Asthma      CAD (coronary artery disease)      Cervical radiculopathy      Chest pain      CHF (congestive heart failure) (HCC)      COPD (chronic obstructive pulmonary disease) (HCC)      Depression      Depression      Gall stones      GERD (gastroesophageal reflux disease)      Hyperlipidemia      Hypertension      Mentally challenged      Seizures (HCC)      Thyroid disease      Tremor        Has h/o CAD, chronic angina. Echocardiogram 2022 revealed preserved EF, mild MR. Stress test 2022 was not diagnostic for ischemia, fixed perfusion defects were noted. The patient was admitted to the hospital on 11/28/2024. [facility] ER last night with chest pain. Chest pain was retrosternal, described as pressure, radiated to left arm, occurred at night (woke him up from sleep). Chest pain resolved with SL NTG and patient went back to sleep, however, it later recurred and patient decided to seek medical attention. He also reports some headache and shortness of breath. Patient denies paroxysmal nocturnal dyspnea, palpitations, dizziness, syncope, recent weight gain or leg swelling. EKG revealed sinus bradycardia, IVCD, LVH. HS Troponin 13, 13. Patient was started on Heparin gtt due to concern for ACS. Cardiology was consulted. \"    Subjective (Events in last 24 hours):   Pt awake and alert.  NAD   no cp or sob. No  06:20 AM    HCT 37.4 11/29/2024 07:48 AM     11/29/2024 07:48 AM       CMP:    Lab Results   Component Value Date/Time     11/29/2024 07:48 AM    K 4.7 11/29/2024 07:48 AM    K 4.9 11/28/2024 04:35 AM     11/29/2024 07:48 AM    CO2 23 11/29/2024 07:48 AM    BUN 18 11/29/2024 07:48 AM    CREATININE 1.1 11/29/2024 07:48 AM    AGRATIO 1.5 08/30/2024 08:01 AM    LABGLOM 74 11/29/2024 07:48 AM    LABGLOM 83 04/20/2024 09:25 AM    GLUCOSE 109 11/29/2024 07:48 AM    GLUCOSE 99 08/30/2024 08:01 AM    CALCIUM 9.3 11/29/2024 07:48 AM       Hepatic Function Panel:    Lab Results   Component Value Date/Time    ALKPHOS 71 11/28/2024 12:06 AM    ALT 29 11/28/2024 12:06 AM    AST 37 11/28/2024 12:06 AM    BILITOT 0.2 11/28/2024 12:06 AM    BILIDIR <0.2 01/25/2024 10:00 AM       Magnesium:    Lab Results   Component Value Date/Time    MG 2.2 11/29/2024 07:48 AM       PT/INR:    Lab Results   Component Value Date/Time    PROTIME 1.11 09/07/2011 07:11 AM    INR 1.10 11/28/2024 12:06 AM       HgBA1c:    Lab Results   Component Value Date/Time    LABA1C 5.7 11/28/2024 04:35 AM       FLP:    Lab Results   Component Value Date/Time    TRIG 44 11/28/2024 04:35 AM    HDL 51 11/28/2024 04:35 AM    LDLDIRECT 71 08/30/2024 08:01 AM       TSH:    Lab Results   Component Value Date/Time    TSH 3.210 11/28/2024 12:06 AM         Assessment:    Chest pain - trop neg x3 - resolved  S/p cath 11/29/24    Non-obstructive major epicardial coronary disease    Preserved EF  OMT  Hx borderline stress test 2022  Ef 55-60 per TTE 11/29/24, Ef 55-60 per TTE 4/2022  HTN  Dyslipidemia  Hx COPD  Hx anxiety       Plan:    Cont asa/plavix/statin  No BB due to bradycardia  Will follow prn  Follow up dr rizzo 2 weeks      Patient and Practitioner mutually agreed upon goal:   Patient and provider goals: Feel better and have more energy     Electronically signed by Negin Guzman PA-C on 11/30/2024 at 7:34 AM

## 2024-11-30 NOTE — PLAN OF CARE
Problem: Chronic Conditions and Co-morbidities  Goal: Patient's chronic conditions and co-morbidity symptoms are monitored and maintained or improved  11/30/2024 1014 by Grecia Voss RN  Outcome: Progressing  Flowsheets (Taken 11/30/2024 1014)  Care Plan - Patient's Chronic Conditions and Co-Morbidity Symptoms are Monitored and Maintained or Improved: Monitor and assess patient's chronic conditions and comorbid symptoms for stability, deterioration, or improvement  11/30/2024 0309 by Amanda Rizzo RN  Outcome: Progressing     Problem: Discharge Planning  Goal: Discharge to home or other facility with appropriate resources  11/30/2024 1014 by Grecia Voss RN  Outcome: Progressing  Flowsheets (Taken 11/30/2024 1014)  Discharge to home or other facility with appropriate resources:   Identify barriers to discharge with patient and caregiver   Arrange for needed discharge resources and transportation as appropriate  11/30/2024 0309 by Amanda Rizzo RN  Outcome: Progressing     Problem: Safety - Adult  Goal: Free from fall injury  11/30/2024 1014 by Grecia Voss RN  Outcome: Progressing  Flowsheets (Taken 11/30/2024 1014)  Free From Fall Injury: Instruct family/caregiver on patient safety  11/30/2024 0309 by Amanda Rizzo RN  Outcome: Progressing     Problem: ABCDS Injury Assessment  Goal: Absence of physical injury  11/30/2024 1014 by Grecia Voss RN  Outcome: Progressing  Flowsheets (Taken 11/30/2024 1014)  Absence of Physical Injury: Implement safety measures based on patient assessment  11/30/2024 0309 by Amanda Rizzo RN  Outcome: Progressing

## 2024-11-30 NOTE — PLAN OF CARE
Problem: Chronic Conditions and Co-morbidities  Goal: Patient's chronic conditions and co-morbidity symptoms are monitored and maintained or improved  Outcome: Progressing     Problem: Discharge Planning  Goal: Discharge to home or other facility with appropriate resources  11/30/2024 0309 by Amanda Rizzo RN  Outcome: Progressing  11/29/2024 1426 by Vidhi Peñaloza LSW  Outcome: Progressing     Problem: Safety - Adult  Goal: Free from fall injury  Outcome: Progressing     Problem: ABCDS Injury Assessment  Goal: Absence of physical injury  Outcome: Progressing

## 2024-11-30 NOTE — DISCHARGE SUMMARY
Hospital Medicine Discharge Summary      Patient Identification:   Luis Enrique Valdes   : 1960  MRN: [id number]   Account: [id number]      Patient's PCP: Joseph Rose MD    Admit Date: 2024     Discharge Date:   24    Admitting Physician: Lindsay Hughes DO     Discharge Physician: Ricco Hutchins MD     Discharge Diagnoses: Chest Pain, Cardiac Ruled out     Active Hospital Problems    Diagnosis Date Noted    Atypical chest pain [R07.89] 2024    Acute coronary syndrome (HCC) [I24.9] 2024    Unstable angina (HCC) [I20.0] 2024    Chest pain [R07.9] 2012       The patient was seen and examined on day of discharge and this discharge summary is in conjunction with any daily progress note from day of discharge.    Hospital Course:   Luis Enrique Valdes is a 64 y.o. male admitted to [facility] on 2024 for chest pain. PMHx of Angina, CHF, COPD. Reports a history of angina for which he follows with Dr. Bethea as OP. States he took a Nitro and it relieved the pain for awhile but he had to take another later on as the chest pain returned.     Admitted for work-up. Troponin neg x 3. Underwent LHC on 24 showing non obstructive disease with preserved EF. Chest pain resolved, Cardiology signed off with close follow-up with Dr. Bethea in 2 weeks. No BB due to bradycardia. Cont ASA/plavix/ and statin.     Ambulating with no difficulties, tolerating PO intake. HD stable on RA.       Exam:     Vitals:  Vitals:    24 2345 24 0045 24 0333 24 0759   BP: (!) 97/59 97/60 139/82 134/77   Pulse: (!) 48 (!) 44 (!) 45 (!) 49   Resp: 18 18 18 16   Temp: 97.9 °F (36.6 °C)  97.7 °F (36.5 °C) 97.7 °F (36.5 °C)   TempSrc: Oral  Oral Oral   SpO2: 95% 95% 95% 97%   Weight:       Height:         Weight: Weight - Scale: 77.5 kg (170 lb 14.4 oz)     24 hour intake/output:  Intake/Output Summary (Last 24 hours) at 2024 1123  Last data filed at  11/30/2024 0333  Gross per 24 hour   Intake --   Output 1305 ml   Net -1305 ml       Labs: For convenience and continuity at follow-up the following most recent labs are provided:      CBC:    Lab Results   Component Value Date/Time    WBC 5.0 11/29/2024 07:48 AM    HGB 12.2 11/29/2024 07:48 AM    HGB 14.2 11/16/2011 06:20 AM    HCT 37.4 11/29/2024 07:48 AM     11/29/2024 07:48 AM       Renal:    Lab Results   Component Value Date/Time     11/30/2024 07:50 AM    K 4.1 11/30/2024 07:50 AM    K 4.9 11/28/2024 04:35 AM     11/30/2024 07:50 AM    CO2 21 11/30/2024 07:50 AM    BUN 20 11/30/2024 07:50 AM    CREATININE 1.1 11/30/2024 07:50 AM    CALCIUM 9.0 11/30/2024 07:50 AM    PHOS 3.8 03/13/2015 01:49 PM         Significant Diagnostic Studies    Radiology:   XR CHEST PORTABLE   Final Result   Impression:   No acute pathology.      This document has been electronically signed by: Vinod Mclean MD on    11/28/2024 02:47 AM             Consults:     IP CONSULT TO CARDIOLOGY  IP CONSULT TO SOCIAL WORK    Disposition:    [] Home       [] TCU       [] Rehab       [] Psych       [] SNF       [x] Long Term Care Facility       [] Other-    Condition at Discharge: Stable    Code Status:  Full Code     Patient Instructions:    Activity: activity as tolerated  Diet: ADULT DIET; Regular; Low Fat/Low Chol/High Fiber/2 gm Na      Follow-up visits:   Joseph Rose MD  [address] [facility] [address] [place] [id number]  [phone]    Follow up on 12/5/2024  11:00 am         Discharge Medications:        Medication List        CHANGE how you take these medications      QUEtiapine 100 MG tablet  Commonly known as: SEROquel  Take 5 tablets by mouth daily  Start taking on: December 1, 2024  What changed:   medication strength  Another medication with the same name was removed. Continue taking this medication, and follow the directions you see here.     Synthroid 88 MCG tablet  Generic drug: levothyroxine  Take one  tablet 7 days/week with extra 1/2 tablet one day/week  What changed:   how much to take  how to take this  when to take this  additional instructions            CONTINUE taking these medications      acetaminophen 500 MG tablet  Commonly known as: TYLENOL     aspirin 81 MG tablet     calcium carbonate 500 MG chewable tablet  Commonly known as: TUMS     citalopram 40 MG tablet  Commonly known as: CELEXA  Take 1 tablet by mouth daily.     clopidogrel 75 MG tablet  Commonly known as: PLAVIX     doxepin 25 MG capsule  Commonly known as: SINEQUAN     ferrous gluconate 324 (38 Fe) MG tablet  Commonly known as: FERGON     finasteride 5 MG tablet  Commonly known as: PROSCAR     HYDROcodone-acetaminophen 5-325 MG per tablet  Commonly known as: NORCO     lactobacillus acidophilus     nitroGLYCERIN 0.4 MG SL tablet  Commonly known as: NITROSTAT  Place 1 tablet under the tongue every 5 minutes as needed for Chest pain.     pantoprazole 40 MG tablet  Commonly known as: PROTONIX     polyethylene glycol 17 g packet  Commonly known as: GLYCOLAX  Take 17 g by mouth 2 times daily.     pravastatin 40 MG tablet  Commonly known as: PRAVACHOL     SINGULAIR PO     solifenacin 10 MG tablet  Commonly known as: VESICARE     sucralfate 1 GM tablet  Commonly known as: CARAFATE     tamsulosin 0.4 MG capsule  Commonly known as: FLOMAX     topiramate 100 MG tablet  Commonly known as: TOPAMAX     vitamin D 1000 UNIT Tabs tablet  Commonly known as: CHOLECALCIFEROL  Take 1 tablet by mouth daily.            STOP taking these medications      benzonatate 100 MG capsule  Commonly known as: TESSALON     docusate sodium 100 MG capsule  Commonly known as: COLACE     guaiFENesin 600 MG extended release tablet  Commonly known as: MUCINEX     zolpidem 10 MG tablet  Commonly known as: AMBIEN               Where to Get Your Medications        Information about where to get these medications is not yet available    Ask your nurse or doctor about these  medications  QUEtiapine 100 MG tablet         Time Spent on discharge is more than 30 minutes in the examination, evaluation, counseling and review of medications and discharge plan.      Signed:    Thank you Joseph Rose MD for the opportunity to be involved in this patient's care.    Electronically signed by Ricco Hutchins MD on 11/30/2024 at 11:23 AM

## 2024-12-02 ENCOUNTER — CARE COORDINATION (OUTPATIENT)
Dept: CASE MANAGEMENT | Age: 64
End: 2024-12-02

## 2024-12-02 DIAGNOSIS — R07.89 ATYPICAL CHEST PAIN: Primary | ICD-10-CM

## 2024-12-02 PROCEDURE — 1111F DSCHRG MED/CURRENT MED MERGE: CPT | Performed by: FAMILY MEDICINE

## 2024-12-02 NOTE — CARE COORDINATION
Transportation Support: Completed             Follow Up Appointment:   Discussed follow up appointments. Patient has hospital follow up appointment scheduled within 7 days of discharge.   Future Appointments         Provider Specialty Dept Phone    12/12/2024 12:30 PM (Arrive by 12:00 PM) [facility] Radiology [phone]    2/3/2025 11:00 AM Umair Segura MD Cardiology [phone]            Care Transition Nurse provided contact information.  Plan for follow-up call in 6-10 days based on severity of symptoms and risk factors.  Plan for next call: symptom management-new or worsening symptoms, chest pain  Final call, lives at [facility], staff monitor closely      Bronwyn Robert RN

## 2024-12-09 ENCOUNTER — CARE COORDINATION (OUTPATIENT)
Dept: CASE MANAGEMENT | Age: 64
End: 2024-12-09

## 2024-12-09 NOTE — CARE COORDINATION
Care Transitions Note    Follow Up Call     Attempted to reach patient for transitions of care follow up.  Unable to reach patient.      Outreach Attempts:   HIPAA compliant voicemail left for patient, family, Adela.     Follow Up Appointment:   Future Appointments         Provider Specialty Dept Phone    12/12/2024 12:30 PM (Arrive by 12:00 PM) [facility] MRI Radiology [phone]    12/16/2024 10:45 AM Madi Holcomb PA-C Cardiology [phone]    2/3/2025 11:00 AM Umair Segura MD Cardiology [phone]            Plan for follow-up on next business day.  based on severity of symptoms and risk factors. Plan for next call: symptom management-new or worsening symptoms, chest pain  Final call, lives at [place], staff monitor closely      Bronwyn Robert RN

## 2024-12-10 ENCOUNTER — CARE COORDINATION (OUTPATIENT)
Dept: CASE MANAGEMENT | Age: 64
End: 2024-12-10

## 2024-12-10 NOTE — CARE COORDINATION
Care Transitions Note    Final Call     Patient Current Location:  Home: [address] [place] [address]  [address]  [facility] [id number]    Care Transition Nurse contacted the family, Adela  by telephone. Verified name and  as identifiers.    Patient graduated from the Care Transitions program on 12/10/24.  Patient/family verbalizes confidence in the ability to self-manage at this time. progressing towards self management. .      Advance Care Planning:   Does patient have an Advance Directive:  family will be getting done thru an  .    Handoff:   Patient was not referred to the ACM team due to no additional needs identified.       Care Summary Note:  Sister, Adela, returned call, said she just talked with Maurice and he has been doing pretty good.  He had low BP the other day which is happens often, Drs haven't figured out why.  He is doing good now.  He has been doing activities, like Bingo at [facility].  Discussed upcoming appts.  She is going to call [facility] to make sure transport has been set up.  No other issues to report.  Denies any other needs.  No other questions or concerns at this time.  Final call, has staff available 24 hrs/day.    Assessments:  No changes since last call    Upcoming Appointments:    Future Appointments         Provider Specialty Dept Phone    2024 12:30 PM (Arrive by 12:00 PM) [facility] MRI Radiology [phone]    2024 10:45 AM Madi Holcomb PA-C Cardiology [phone]    2/3/2025 11:00 AM Umair Segura MD Cardiology [phone]            Patient has agreed to contact primary care provider and/or specialist for any further questions, concerns, or needs.    Bronwyn Robert RN        Care Transitions Note    Follow Up Call -2nd attempt    Attempted to reach patient for transitions of care follow up.  Unable to reach patient.  If no return call, CTN will sign off-2nd attempt.    Outreach Attempts:   Multiple attempts to contact patient, family, Adela at

## 2024-12-16 ENCOUNTER — OFFICE VISIT (OUTPATIENT)
Dept: CARDIOLOGY CLINIC | Age: 64
End: 2024-12-16
Payer: MEDICARE

## 2024-12-16 VITALS
SYSTOLIC BLOOD PRESSURE: 124 MMHG | WEIGHT: 177 LBS | HEIGHT: 71 IN | HEART RATE: 59 BPM | BODY MASS INDEX: 24.78 KG/M2 | DIASTOLIC BLOOD PRESSURE: 74 MMHG

## 2024-12-16 DIAGNOSIS — I25.10 CORONARY ARTERY DISEASE INVOLVING NATIVE CORONARY ARTERY OF NATIVE HEART WITHOUT ANGINA PECTORIS: Primary | ICD-10-CM

## 2024-12-16 DIAGNOSIS — E78.00 PURE HYPERCHOLESTEROLEMIA: ICD-10-CM

## 2024-12-16 DIAGNOSIS — I10 PRIMARY HYPERTENSION: ICD-10-CM

## 2024-12-16 PROCEDURE — 3078F DIAST BP <80 MM HG: CPT | Performed by: PHYSICIAN ASSISTANT

## 2024-12-16 PROCEDURE — 1159F MED LIST DOCD IN RCRD: CPT | Performed by: PHYSICIAN ASSISTANT

## 2024-12-16 PROCEDURE — 99214 OFFICE O/P EST MOD 30 MIN: CPT | Performed by: PHYSICIAN ASSISTANT

## 2024-12-16 PROCEDURE — 1123F ACP DISCUSS/DSCN MKR DOCD: CPT | Performed by: PHYSICIAN ASSISTANT

## 2024-12-16 PROCEDURE — 3074F SYST BP LT 130 MM HG: CPT | Performed by: PHYSICIAN ASSISTANT

## 2024-12-16 RX ORDER — GUAIFENESIN 600 MG/1
TABLET, EXTENDED RELEASE ORAL
COMMUNITY
Start: 2024-12-01

## 2024-12-16 RX ORDER — DOCUSATE SODIUM 100 MG
CAPSULE ORAL
COMMUNITY
Start: 2024-12-01

## 2024-12-16 RX ORDER — ZOLPIDEM TARTRATE 10 MG/1
10 TABLET ORAL NIGHTLY PRN
COMMUNITY
Start: 2024-12-11

## 2024-12-16 RX ORDER — LORAZEPAM 1 MG/1
1 TABLET ORAL
COMMUNITY
Start: 2024-12-13

## 2024-12-16 NOTE — PROGRESS NOTES
daily      topiramate (TOPAMAX) 100 MG tablet Take 1 tablet by mouth 2 times daily      pravastatin (PRAVACHOL) 40 MG tablet Take 1 tablet by mouth nightly      vitamin D (CHOLECALCIFEROL) 1000 UNIT TABS tablet Take 1 tablet by mouth daily. 30 tablet 1    citalopram (CELEXA) 40 MG tablet Take 1 tablet by mouth daily. 30 tablet 1    nitroGLYCERIN (NITROSTAT) 0.4 MG SL tablet Place 1 tablet under the tongue every 5 minutes as needed for Chest pain. 25 tablet 1    polyethylene glycol (GLYCOLAX) packet Take 17 g by mouth 2 times daily. 1200 g 11    pantoprazole (PROTONIX) 40 MG tablet Take 1 tablet by mouth daily      Montelukast Sodium (SINGULAIR PO)   Take 10 mg by mouth daily        No current facility-administered medications for this visit.       Social History     Socioeconomic History    Marital status: Single     Spouse name: None    Number of children: None    Years of education: None    Highest education level: None   Tobacco Use    Smoking status: Never    Smokeless tobacco: Never   Vaping Use    Vaping status: Never Used   Substance and Sexual Activity    Alcohol use: No     Alcohol/week: 0.0 standard drinks of alcohol    Drug use: No     Social Determinants of Health     Food Insecurity: No Food Insecurity (11/28/2024)    Hunger Vital Sign     Worried About Running Out of Food in the Last Year: Never true     Ran Out of Food in the Last Year: Never true   Transportation Needs: No Transportation Needs (11/28/2024)    PRAPARE - Transportation     Lack of Transportation (Medical): No     Lack of Transportation (Non-Medical): No   Housing Stability: Low Risk  (11/28/2024)    Housing Stability Vital Sign     Unable to Pay for Housing in the Last Year: No     Number of Times Moved in the Last Year: 1     Homeless in the Last Year: No       Family History   Problem Relation Age of Onset    Cancer Mother     Diabetes Mother     Heart Disease Mother     High Blood Pressure Mother     High Cholesterol Mother

## 2024-12-29 ENCOUNTER — HOSPITAL ENCOUNTER (EMERGENCY)
Age: 64
Discharge: HOME | End: 2024-12-29
Attending: EMERGENCY MEDICINE
Payer: MEDICARE

## 2024-12-29 ENCOUNTER — APPOINTMENT (OUTPATIENT)
Dept: GENERAL RADIOLOGY | Age: 64
End: 2024-12-29
Payer: MEDICARE

## 2024-12-29 VITALS
WEIGHT: 177 LBS | HEART RATE: 46 BPM | TEMPERATURE: 98 F | HEIGHT: 71 IN | SYSTOLIC BLOOD PRESSURE: 155 MMHG | BODY MASS INDEX: 24.78 KG/M2 | OXYGEN SATURATION: 96 % | DIASTOLIC BLOOD PRESSURE: 66 MMHG | RESPIRATION RATE: 14 BRPM

## 2024-12-29 DIAGNOSIS — R07.9 CHEST PAIN, UNSPECIFIED TYPE: Primary | ICD-10-CM

## 2024-12-29 LAB
ALBUMIN SERPL BCG-MCNC: 3.7 G/DL (ref 3.5–5.1)
ALP SERPL-CCNC: 69 U/L (ref 38–126)
ALT SERPL W/O P-5'-P-CCNC: 11 U/L (ref 11–66)
ANION GAP SERPL CALC-SCNC: 8 MEQ/L (ref 8–16)
AST SERPL-CCNC: 13 U/L (ref 5–40)
BASOPHILS ABSOLUTE: 0 THOU/MM3 (ref 0–0.1)
BASOPHILS NFR BLD AUTO: 1.1 %
BILIRUB SERPL-MCNC: 0.2 MG/DL (ref 0.3–1.2)
BUN SERPL-MCNC: 22 MG/DL (ref 7–22)
CALCIUM SERPL-MCNC: 8.5 MG/DL (ref 8.5–10.5)
CHLORIDE SERPL-SCNC: 110 MEQ/L (ref 98–111)
CO2 SERPL-SCNC: 22 MEQ/L (ref 23–33)
CREAT SERPL-MCNC: 1.1 MG/DL (ref 0.4–1.2)
D DIMER PPP IA.FEU-MCNC: < 215 NG/ML FEU (ref 0–500)
DEPRECATED RDW RBC AUTO: 45.1 FL (ref 35–45)
EKG ATRIAL RATE: 52 BPM
EKG P AXIS: 47 DEGREES
EKG P-R INTERVAL: 150 MS
EKG Q-T INTERVAL: 518 MS
EKG QRS DURATION: 134 MS
EKG QTC CALCULATION (BAZETT): 481 MS
EKG R AXIS: 1 DEGREES
EKG T AXIS: 29 DEGREES
EKG VENTRICULAR RATE: 52 BPM
EOSINOPHIL NFR BLD AUTO: 8.5 %
EOSINOPHILS ABSOLUTE: 0.4 THOU/MM3 (ref 0–0.4)
ERYTHROCYTE [DISTWIDTH] IN BLOOD BY AUTOMATED COUNT: 12.4 % (ref 11.5–14.5)
FLUAV RNA RESP QL NAA+PROBE: NOT DETECTED
FLUBV RNA RESP QL NAA+PROBE: NOT DETECTED
GFR SERPL CREATININE-BSD FRML MDRD: 74 ML/MIN/1.73M2
GLUCOSE SERPL-MCNC: 70 MG/DL (ref 70–108)
HCT VFR BLD AUTO: 33.1 % (ref 42–52)
HGB BLD-MCNC: 10.6 GM/DL (ref 14–18)
IMM GRANULOCYTES # BLD AUTO: 0.01 THOU/MM3 (ref 0–0.07)
IMM GRANULOCYTES NFR BLD AUTO: 0.2 %
LIPASE SERPL-CCNC: 16.2 U/L (ref 5.6–51.3)
LYMPHOCYTES ABSOLUTE: 1.9 THOU/MM3 (ref 1–4.8)
LYMPHOCYTES NFR BLD AUTO: 42.8 %
MAGNESIUM SERPL-MCNC: 2.1 MG/DL (ref 1.6–2.4)
MCH RBC QN AUTO: 31.4 PG (ref 26–33)
MCHC RBC AUTO-ENTMCNC: 32 GM/DL (ref 32.2–35.5)
MCV RBC AUTO: 97.9 FL (ref 80–94)
MONOCYTES ABSOLUTE: 0.6 THOU/MM3 (ref 0.4–1.3)
MONOCYTES NFR BLD AUTO: 14.2 %
NEUTROPHILS ABSOLUTE: 1.5 THOU/MM3 (ref 1.8–7.7)
NEUTROPHILS NFR BLD AUTO: 33.2 %
NRBC BLD AUTO-RTO: 0 /100 WBC
OSMOLALITY SERPL CALC.SUM OF ELEC: 281.1 MOSMOL/KG (ref 275–300)
PLATELET # BLD AUTO: 177 THOU/MM3 (ref 130–400)
PMV BLD AUTO: 11 FL (ref 9.4–12.4)
POTASSIUM SERPL-SCNC: 4.2 MEQ/L (ref 3.5–5.2)
PROT SERPL-MCNC: 6.1 G/DL (ref 6.1–8)
RBC # BLD AUTO: 3.38 MILL/MM3 (ref 4.7–6.1)
SARS-COV-2 RNA RESP QL NAA+PROBE: NOT DETECTED
SODIUM SERPL-SCNC: 140 MEQ/L (ref 135–145)
T4 FREE SERPL-MCNC: 0.74 NG/DL (ref 0.93–1.68)
TROPONIN, HIGH SENSITIVITY: 11 NG/L (ref 0–12)
TROPONIN, HIGH SENSITIVITY: 12 NG/L (ref 0–12)
TSH SERPL DL<=0.005 MIU/L-ACNC: 1.57 UIU/ML (ref 0.4–4.2)
WBC # BLD AUTO: 4.4 THOU/MM3 (ref 4.8–10.8)

## 2024-12-29 PROCEDURE — 36415 COLL VENOUS BLD VENIPUNCTURE: CPT

## 2024-12-29 PROCEDURE — 83735 ASSAY OF MAGNESIUM: CPT

## 2024-12-29 PROCEDURE — 84484 ASSAY OF TROPONIN QUANT: CPT

## 2024-12-29 PROCEDURE — 84443 ASSAY THYROID STIM HORMONE: CPT

## 2024-12-29 PROCEDURE — 80053 COMPREHEN METABOLIC PANEL: CPT

## 2024-12-29 PROCEDURE — 87636 SARSCOV2 & INF A&B AMP PRB: CPT

## 2024-12-29 PROCEDURE — 99285 EMERGENCY DEPT VISIT HI MDM: CPT

## 2024-12-29 PROCEDURE — 71045 X-RAY EXAM CHEST 1 VIEW: CPT

## 2024-12-29 PROCEDURE — 93005 ELECTROCARDIOGRAM TRACING: CPT

## 2024-12-29 PROCEDURE — 84439 ASSAY OF FREE THYROXINE: CPT

## 2024-12-29 PROCEDURE — 83690 ASSAY OF LIPASE: CPT

## 2024-12-29 PROCEDURE — 85379 FIBRIN DEGRADATION QUANT: CPT

## 2024-12-29 PROCEDURE — 85025 COMPLETE CBC W/AUTO DIFF WBC: CPT

## 2024-12-29 PROCEDURE — 93010 ELECTROCARDIOGRAM REPORT: CPT | Performed by: INTERNAL MEDICINE

## 2024-12-29 ASSESSMENT — HEART SCORE: ECG: NORMAL

## 2024-12-29 ASSESSMENT — PAIN - FUNCTIONAL ASSESSMENT
PAIN_FUNCTIONAL_ASSESSMENT: NONE - DENIES PAIN
PAIN_FUNCTIONAL_ASSESSMENT: NONE - DENIES PAIN

## 2024-12-29 NOTE — ED PROVIDER NOTES
tablet by mouth daily, Disp: , Rfl:     Montelukast Sodium (SINGULAIR PO),  Take 10 mg by mouth daily , Disp: , Rfl:     Previous Medications    ACETAMINOPHEN (TYLENOL) 500 MG TABLET    Take 1 tablet by mouth every 4 hours as needed for Pain    ASPIRIN 81 MG TABLET    Take 1 tablet by mouth daily    CALCIUM CARBONATE (TUMS) 500 MG CHEWABLE TABLET    Take 1 tablet by mouth as needed for Heartburn    CITALOPRAM (CELEXA) 40 MG TABLET    Take 1 tablet by mouth daily.    CLOPIDOGREL (PLAVIX) 75 MG TABLET    Take 1 tablet by mouth daily    DOXEPIN (SINEQUAN) 25 MG CAPSULE    Take 1 capsule by mouth nightly    FERROUS GLUCONATE (FERGON) 324 (38 FE) MG TABLET    Take 1 tablet by mouth daily (with breakfast)    FINASTERIDE (PROSCAR) 5 MG TABLET    Take 1 tablet by mouth daily    HYDROCODONE-ACETAMINOPHEN (NORCO) 5-325 MG PER TABLET    Take 1 tablet by mouth every 6 hours as needed for Pain.    LACTOBACILLUS ACIDOPHILUS (FLORANEX)    Take 1 tablet by mouth 3 times daily    LORAZEPAM (ATIVAN) 1 MG TABLET    Take 1 tablet by mouth.    MONTELUKAST SODIUM (SINGULAIR PO)      Take 10 mg by mouth daily     MUCUS RELIEF 600 MG EXTENDED RELEASE TABLET        NITROGLYCERIN (NITROSTAT) 0.4 MG SL TABLET    Place 1 tablet under the tongue every 5 minutes as needed for Chest pain.    PANTOPRAZOLE (PROTONIX) 40 MG TABLET    Take 1 tablet by mouth daily    POLYETHYLENE GLYCOL (GLYCOLAX) PACKET    Take 17 g by mouth 2 times daily.    PRAVASTATIN (PRAVACHOL) 40 MG TABLET    Take 1 tablet by mouth nightly    QUETIAPINE (SEROQUEL) 100 MG TABLET    Take 5 tablets by mouth daily    SOLIFENACIN (VESICARE) 10 MG TABLET    Take 1 tablet by mouth daily    STOOL SOFTENER 100 MG CAPSULE        SUCRALFATE (CARAFATE) 1 GM TABLET    Take 1 tablet by mouth 4 times daily (before meals and nightly)    SYNTHROID 88 MCG TABLET    Take one tablet 7 days/week with extra 1/2 tablet one day/week    TAMSULOSIN (FLOMAX) 0.4 MG CAPSULE    Take 2 capsules by mouth at

## 2024-12-29 NOTE — ED NOTES
Pt presents to ED via [facility] from [facility] for chest pain that started around 1015 this morning. EMS states [facility] gave nitro about 1030 but states BP has been soft since given nitro with systolic in 80s. Pt told EMS pain was in the center and radiated toward left shoulder and left jaw. Pt denies chest pain on arrival.

## 2024-12-29 NOTE — ED NOTES
Patient resting on cart watching TV.  Patient denies chest pain.  Respirations unlabored.  Patient updated on plan of care.  Patient provided ice water.  Will continue to monitor.  Call light in reach.

## 2024-12-29 NOTE — ED NOTES
Patient provided meal tray at this time.  Patient updated on pending discharge.  Call light in reach.

## 2024-12-29 NOTE — DISCHARGE INSTRUCTIONS
Your evaluated in the ED today for chest pain.  This is most likely due to stable angina, which is treated with nitroglycerin at home.  You will be discharged back home with no new medications or changes to your medication regimen as prescribed.  Please come into the ED should develop chest pain that is unrelieved with nitroglycerin.  In addition, please schedule follow-up appointment with cardiology for early next week.

## 2024-12-29 NOTE — ED NOTES
Patient resting on cart watching TV.  Patient denies chest pain.  Respirations unlabored.  Patient updated on plan of care.  Will continue to monitor.  Call light in reach.

## 2024-12-31 ENCOUNTER — TELEPHONE (OUTPATIENT)
Dept: CARDIOLOGY CLINIC | Age: 64
End: 2024-12-31

## 2024-12-31 NOTE — TELEPHONE ENCOUNTER
Received fax from [facility] that pt was in ER for chest pain and needs a fu     Lm for pt to call office

## 2025-01-06 ENCOUNTER — HOSPITAL ENCOUNTER (OUTPATIENT)
Dept: MRI IMAGING | Age: 65
Discharge: HOME | End: 2025-01-06
Payer: MEDICARE

## 2025-01-06 DIAGNOSIS — M51.24 DISPLACEMENT OF THORACIC INTERVERTEBRAL DISC: ICD-10-CM

## 2025-01-06 PROCEDURE — 72146 MRI CHEST SPINE W/O DYE: CPT

## 2025-01-06 NOTE — TELEPHONE ENCOUNTER
LM for pt to return call.    Left detailed message asking to return call if an appt with a cardiologist is needed.  Encounter will be closed due to pt not responding.

## 2025-02-03 ENCOUNTER — OFFICE VISIT (OUTPATIENT)
Dept: CARDIOLOGY CLINIC | Age: 65
End: 2025-02-03
Payer: MEDICARE

## 2025-02-03 VITALS
BODY MASS INDEX: 24.35 KG/M2 | HEART RATE: 60 BPM | WEIGHT: 179.8 LBS | SYSTOLIC BLOOD PRESSURE: 130 MMHG | DIASTOLIC BLOOD PRESSURE: 82 MMHG | HEIGHT: 72 IN

## 2025-02-03 DIAGNOSIS — I25.10 CORONARY ARTERY DISEASE INVOLVING NATIVE CORONARY ARTERY OF NATIVE HEART WITHOUT ANGINA PECTORIS: Primary | ICD-10-CM

## 2025-02-03 DIAGNOSIS — E78.01 FAMILIAL HYPERCHOLESTEROLEMIA: ICD-10-CM

## 2025-02-03 PROBLEM — I11.0 HYPERTENSIVE HEART DISEASE WITH HEART FAILURE (HCC): Status: ACTIVE | Noted: 2025-02-03

## 2025-02-03 PROCEDURE — 3079F DIAST BP 80-89 MM HG: CPT | Performed by: NUCLEAR MEDICINE

## 2025-02-03 PROCEDURE — 1123F ACP DISCUSS/DSCN MKR DOCD: CPT | Performed by: NUCLEAR MEDICINE

## 2025-02-03 PROCEDURE — 99213 OFFICE O/P EST LOW 20 MIN: CPT | Performed by: NUCLEAR MEDICINE

## 2025-02-03 PROCEDURE — 3075F SYST BP GE 130 - 139MM HG: CPT | Performed by: NUCLEAR MEDICINE

## 2025-02-03 PROCEDURE — 1159F MED LIST DOCD IN RCRD: CPT | Performed by: NUCLEAR MEDICINE

## 2025-02-03 RX ORDER — CYCLOBENZAPRINE HCL 10 MG
10 TABLET ORAL 2 TIMES DAILY PRN
COMMUNITY

## 2025-02-03 RX ORDER — ONDANSETRON 8 MG/1
8 TABLET, FILM COATED ORAL 2 TIMES DAILY PRN
COMMUNITY
Start: 2024-12-20

## 2025-02-03 RX ORDER — TRAMADOL HYDROCHLORIDE 50 MG/1
50 TABLET ORAL EVERY 6 HOURS PRN
COMMUNITY

## 2025-02-03 NOTE — PROGRESS NOTES
Patient here for follow up.  Patient complains of getting dizzy when sitting up to quick.   
Colorectal Cancer Screen  06/30/2025    Pneumococcal 65+ years Vaccine (3 of 3 - PPSV23 or PCV20) 08/29/2025    A1C test (Diabetic or Prediabetic)  11/28/2025    Lipids  11/28/2025    GFR test (Diabetes, CKD 3-4, OR last GFR 15-59)  12/29/2025    DTaP/Tdap/Td vaccine (2 - Td or Tdap) 08/29/2034    Flu vaccine  Completed    Hepatitis A vaccine  Aged Out    Hepatitis B vaccine  Aged Out    Hib vaccine  Aged Out    Polio vaccine  Aged Out    Meningococcal (ACWY) vaccine  Aged Out    Pneumococcal 0-64 years Vaccine  Discontinued    Diabetes screen  Discontinued    Prostate Specific Antigen (PSA) Screening or Monitoring  Discontinued       Subjective:  General:   No fever, no chills, No fatigue or weight loss  Pulmonary:    No dyspnea, no wheezing  Cardiac:    Denies recent chest pain,   GI:     No nausea or vomiting, no abdominal pain  Neuro:    some dizziness or light headedness,   Musculoskeletal:  No recent active issues  Extremities:   No edema, no obvious claudication       Objective:  General:   Well developed, well nourished  Lungs:   Clear to auscultation  Heart:    Normal S1 S2, Slight murmur. no rubs, no gallops  Abdomen:   Soft, non tender, no organomegalies, positive bowel sounds  Extremities:   No edema, no cyanosis, good peripheral pulses  Neurological:   Awake, alert, oriented. No obvious focal deficits  Musculoskelatal:  No obvious deformities   /82   Pulse 60   Ht 1.829 m (6')   Wt 81.6 kg (179 lb 12.8 oz)   BMI 24.39 kg/m²     Assessment:  Assessment & Plan    Diagnosis Orders   1. Coronary artery disease involving native coronary artery of native heart without angina pectoris        2. Familial hypercholesterolemia        As above  Dizziness, ?? Causes  Low BP?  ? arrhythmia     Plan:  No follow-ups on file.  Consider event monitor   Monitor the BP  Continue risk factor modification and medical management  Thank you for allowing me to participate in the care of your patient. Please don't 
Discharged

## 2025-02-25 ENCOUNTER — APPOINTMENT (OUTPATIENT)
Dept: CT IMAGING | Age: 65
DRG: 292 | End: 2025-02-25
Payer: MEDICARE

## 2025-02-25 ENCOUNTER — HOSPITAL ENCOUNTER (INPATIENT)
Age: 65
LOS: 1 days | Discharge: NURSING, REHAB OR LONG-TERM CARE | DRG: 292 | End: 2025-02-28
Attending: NURSE PRACTITIONER | Admitting: STUDENT IN AN ORGANIZED HEALTH CARE EDUCATION/TRAINING PROGRAM
Payer: MEDICARE

## 2025-02-25 ENCOUNTER — APPOINTMENT (OUTPATIENT)
Dept: GENERAL RADIOLOGY | Age: 65
DRG: 292 | End: 2025-02-25
Payer: MEDICARE

## 2025-02-25 DIAGNOSIS — I20.9 ANGINA PECTORIS: ICD-10-CM

## 2025-02-25 DIAGNOSIS — R10.13 EPIGASTRIC PAIN: ICD-10-CM

## 2025-02-25 DIAGNOSIS — R07.9 CHEST PAIN, UNSPECIFIED TYPE: Primary | ICD-10-CM

## 2025-02-25 LAB
ALBUMIN SERPL BCG-MCNC: 4.3 G/DL (ref 3.4–4.9)
ALP SERPL-CCNC: 77 U/L (ref 40–129)
ALT SERPL W/O P-5'-P-CCNC: 24 U/L (ref 10–50)
ANION GAP SERPL CALC-SCNC: 10 MEQ/L (ref 8–16)
AST SERPL-CCNC: 28 U/L (ref 10–50)
BASOPHILS ABSOLUTE: 0 THOU/MM3 (ref 0–0.1)
BASOPHILS NFR BLD AUTO: 0.3 %
BILIRUB CONJ SERPL-MCNC: < 0.1 MG/DL (ref 0–0.2)
BILIRUB SERPL-MCNC: 0.2 MG/DL (ref 0.3–1.2)
BILIRUB UR QL STRIP.AUTO: NEGATIVE
BUN SERPL-MCNC: 22 MG/DL (ref 8–23)
CALCIUM SERPL-MCNC: 9.1 MG/DL (ref 8.2–9.6)
CHARACTER UR: CLEAR
CHLORIDE SERPL-SCNC: 109 MEQ/L (ref 98–111)
CO2 SERPL-SCNC: 21 MEQ/L (ref 22–29)
COLOR, UA: YELLOW
CREAT SERPL-MCNC: 1.2 MG/DL (ref 0.7–1.2)
DEPRECATED RDW RBC AUTO: 43.9 FL (ref 35–45)
EOSINOPHIL NFR BLD AUTO: 2 %
EOSINOPHILS ABSOLUTE: 0.2 THOU/MM3 (ref 0–0.4)
ERYTHROCYTE [DISTWIDTH] IN BLOOD BY AUTOMATED COUNT: 12.5 % (ref 11.5–14.5)
GFR SERPL CREATININE-BSD FRML MDRD: 66 ML/MIN/1.73M2
GLUCOSE SERPL-MCNC: 113 MG/DL (ref 70–108)
GLUCOSE UR QL STRIP.AUTO: NEGATIVE MG/DL
HCT VFR BLD AUTO: 39.7 % (ref 42–52)
HGB BLD-MCNC: 12.7 GM/DL (ref 14–18)
HGB UR QL STRIP.AUTO: NEGATIVE
IMM GRANULOCYTES # BLD AUTO: 0.04 THOU/MM3 (ref 0–0.07)
IMM GRANULOCYTES NFR BLD AUTO: 0.4 %
KETONES UR QL STRIP.AUTO: NEGATIVE
LACTATE SERPL-SCNC: 1.2 MMOL/L (ref 0.5–2)
LYMPHOCYTES ABSOLUTE: 0.9 THOU/MM3 (ref 1–4.8)
LYMPHOCYTES NFR BLD AUTO: 7.6 %
MCH RBC QN AUTO: 30.8 PG (ref 26–33)
MCHC RBC AUTO-ENTMCNC: 32 GM/DL (ref 32.2–35.5)
MCV RBC AUTO: 96.1 FL (ref 80–94)
MONOCYTES ABSOLUTE: 0.8 THOU/MM3 (ref 0.4–1.3)
MONOCYTES NFR BLD AUTO: 7 %
NEUTROPHILS ABSOLUTE: 9.3 THOU/MM3 (ref 1.8–7.7)
NEUTROPHILS NFR BLD AUTO: 82.7 %
NITRITE UR QL STRIP: NEGATIVE
NRBC BLD AUTO-RTO: 0 /100 WBC
NT-PROBNP SERPL IA-MCNC: 233 PG/ML (ref 0–124)
OSMOLALITY SERPL CALC.SUM OF ELEC: 283.5 MOSMOL/KG (ref 275–300)
PH UR STRIP.AUTO: 5.5 [PH] (ref 5–9)
PLATELET # BLD AUTO: 190 THOU/MM3 (ref 130–400)
PMV BLD AUTO: 10.9 FL (ref 9.4–12.4)
POTASSIUM SERPL-SCNC: 4.1 MEQ/L (ref 3.5–5.2)
PROT SERPL-MCNC: 7 G/DL (ref 6.4–8.3)
PROT UR STRIP.AUTO-MCNC: NEGATIVE MG/DL
RBC # BLD AUTO: 4.13 MILL/MM3 (ref 4.7–6.1)
SODIUM SERPL-SCNC: 140 MEQ/L (ref 135–145)
SP GR UR REFRACT.AUTO: > 1.03 (ref 1–1.03)
TROPONIN, HIGH SENSITIVITY: 11 NG/L (ref 0–12)
TROPONIN, HIGH SENSITIVITY: 11 NG/L (ref 0–12)
UROBILINOGEN, URINE: 0.2 EU/DL (ref 0–1)
WBC # BLD AUTO: 11.3 THOU/MM3 (ref 4.8–10.8)
WBC #/AREA URNS HPF: NEGATIVE /[HPF]

## 2025-02-25 PROCEDURE — 6360000002 HC RX W HCPCS: Performed by: PHYSICIAN ASSISTANT

## 2025-02-25 PROCEDURE — 36415 COLL VENOUS BLD VENIPUNCTURE: CPT

## 2025-02-25 PROCEDURE — 96375 TX/PRO/DX INJ NEW DRUG ADDON: CPT

## 2025-02-25 PROCEDURE — 93005 ELECTROCARDIOGRAM TRACING: CPT | Performed by: NURSE PRACTITIONER

## 2025-02-25 PROCEDURE — 71045 X-RAY EXAM CHEST 1 VIEW: CPT

## 2025-02-25 PROCEDURE — 83605 ASSAY OF LACTIC ACID: CPT

## 2025-02-25 PROCEDURE — 84484 ASSAY OF TROPONIN QUANT: CPT

## 2025-02-25 PROCEDURE — 80048 BASIC METABOLIC PNL TOTAL CA: CPT

## 2025-02-25 PROCEDURE — 83880 ASSAY OF NATRIURETIC PEPTIDE: CPT

## 2025-02-25 PROCEDURE — 74177 CT ABD & PELVIS W/CONTRAST: CPT

## 2025-02-25 PROCEDURE — 96374 THER/PROPH/DIAG INJ IV PUSH: CPT

## 2025-02-25 PROCEDURE — 2500000003 HC RX 250 WO HCPCS: Performed by: PHYSICIAN ASSISTANT

## 2025-02-25 PROCEDURE — 80076 HEPATIC FUNCTION PANEL: CPT

## 2025-02-25 PROCEDURE — 99285 EMERGENCY DEPT VISIT HI MDM: CPT

## 2025-02-25 PROCEDURE — 6360000004 HC RX CONTRAST MEDICATION: Performed by: PHYSICIAN ASSISTANT

## 2025-02-25 PROCEDURE — 85025 COMPLETE CBC W/AUTO DIFF WBC: CPT

## 2025-02-25 PROCEDURE — 81003 URINALYSIS AUTO W/O SCOPE: CPT

## 2025-02-25 RX ORDER — DIPHENHYDRAMINE HYDROCHLORIDE 50 MG/ML
25 INJECTION INTRAMUSCULAR; INTRAVENOUS ONCE
Status: COMPLETED | OUTPATIENT
Start: 2025-02-25 | End: 2025-02-25

## 2025-02-25 RX ORDER — IOPAMIDOL 755 MG/ML
80 INJECTION, SOLUTION INTRAVASCULAR
Status: COMPLETED | OUTPATIENT
Start: 2025-02-25 | End: 2025-02-25

## 2025-02-25 RX ADMIN — IOPAMIDOL 80 ML: 755 INJECTION, SOLUTION INTRAVENOUS at 21:25

## 2025-02-25 RX ADMIN — WATER 125 MG: 1 INJECTION INTRAMUSCULAR; INTRAVENOUS; SUBCUTANEOUS at 20:56

## 2025-02-25 RX ADMIN — DIPHENHYDRAMINE HYDROCHLORIDE 25 MG: 50 INJECTION INTRAMUSCULAR; INTRAVENOUS at 20:55

## 2025-02-25 ASSESSMENT — PAIN - FUNCTIONAL ASSESSMENT
PAIN_FUNCTIONAL_ASSESSMENT: WONG-BAKER FACES
PAIN_FUNCTIONAL_ASSESSMENT: NONE - DENIES PAIN
PAIN_FUNCTIONAL_ASSESSMENT: 0-10

## 2025-02-25 ASSESSMENT — PAIN SCALES - WONG BAKER
WONGBAKER_NUMERICALRESPONSE: HURTS A LITTLE BIT
WONGBAKER_NUMERICALRESPONSE: HURTS A LITTLE BIT
WONGBAKER_NUMERICALRESPONSE: HURTS LITTLE MORE

## 2025-02-25 ASSESSMENT — PAIN SCALES - GENERAL: PAINLEVEL_OUTOF10: 2

## 2025-02-26 ENCOUNTER — APPOINTMENT (OUTPATIENT)
Dept: NUCLEAR MEDICINE | Age: 65
DRG: 292 | End: 2025-02-26
Attending: INTERNAL MEDICINE
Payer: MEDICARE

## 2025-02-26 ENCOUNTER — HOSPITAL ENCOUNTER (OUTPATIENT)
Age: 65
Setting detail: OBSERVATION
Discharge: HOME | End: 2025-02-28
Attending: INTERNAL MEDICINE
Payer: MEDICARE

## 2025-02-26 LAB
ANION GAP SERPL CALC-SCNC: 12 MEQ/L (ref 8–16)
ARTERIAL PATENCY WRIST A: POSITIVE
BASE EXCESS BLDA CALC-SCNC: -4.4 MMOL/L (ref -2.5–2.5)
BDY SITE: ABNORMAL
BUN SERPL-MCNC: 26 MG/DL (ref 8–23)
CA-I BLD ISE-SCNC: 1.12 MMOL/L (ref 1.12–1.32)
CALCIUM SERPL-MCNC: 7.4 MG/DL (ref 8.2–9.6)
CHLORIDE SERPL-SCNC: 113 MEQ/L (ref 98–111)
CO2 SERPL-SCNC: 16 MEQ/L (ref 22–29)
COLLECTED BY:: ABNORMAL
CREAT SERPL-MCNC: 0.9 MG/DL (ref 0.7–1.2)
CRP SERPL-MCNC: 0.98 MG/DL (ref 0–0.5)
DEVICE: ABNORMAL
ECHO BSA: 1.98 M2
EKG ATRIAL RATE: 64 BPM
EKG ATRIAL RATE: 88 BPM
EKG P AXIS: 51 DEGREES
EKG P AXIS: 57 DEGREES
EKG P-R INTERVAL: 136 MS
EKG P-R INTERVAL: 146 MS
EKG Q-T INTERVAL: 426 MS
EKG Q-T INTERVAL: 466 MS
EKG QRS DURATION: 116 MS
EKG QRS DURATION: 130 MS
EKG QTC CALCULATION (BAZETT): 480 MS
EKG QTC CALCULATION (BAZETT): 515 MS
EKG R AXIS: -11 DEGREES
EKG R AXIS: 1 DEGREES
EKG T AXIS: 46 DEGREES
EKG T AXIS: 64 DEGREES
EKG VENTRICULAR RATE: 64 BPM
EKG VENTRICULAR RATE: 88 BPM
FIO2 ON VENT O2 ANALYZER: 21 %
GFR SERPL CREATININE-BSD FRML MDRD: > 90 ML/MIN/1.73M2
GLUCOSE SERPL-MCNC: 136 MG/DL (ref 74–109)
HCO3 BLDA-SCNC: 19 MMOL/L (ref 23–28)
LIPASE SERPL-CCNC: 13 U/L (ref 13–60)
NUC STRESS EJECTION FRACTION: 49 %
OSMOLALITY SERPL CALC.SUM OF ELEC: 288.1 MOSMOL/KG (ref 275–300)
PCO2 BLDA: 29 MMHG (ref 35–45)
PH BLDA: 7.43 [PH] (ref 7.35–7.45)
PO2 BLDA: 72 MMHG (ref 71–104)
POTASSIUM SERPL-SCNC: 3.5 MEQ/L (ref 3.5–5.2)
SAO2 % BLDA: 95 %
SODIUM SERPL-SCNC: 141 MEQ/L (ref 135–145)
STRESS BASELINE DIAS BP: 68 MMHG
STRESS BASELINE HR: 69 BPM
STRESS BASELINE SYS BP: 149 MMHG
STRESS ESTIMATED WORKLOAD: 1 METS
STRESS PEAK DIAS BP: 65 MMHG
STRESS PEAK SYS BP: 151 MMHG
STRESS PERCENT HR ACHIEVED: 59 %
STRESS POST PEAK HR: 90 BPM
STRESS RATE PRESSURE PRODUCT: NORMAL BPM*MMHG
STRESS STAGE 1 BP: NORMAL MMHG
STRESS STAGE 1 DURATION: 1 MIN:SEC
STRESS STAGE 1 HR: 81 BPM
STRESS STAGE 2 BP: NORMAL MMHG
STRESS STAGE 2 DURATION: 1 MIN:SEC
STRESS STAGE 2 HR: 90 BPM
STRESS STAGE 3 BP: NORMAL MMHG
STRESS STAGE 3 DURATION: 1 MIN:SEC
STRESS STAGE 3 HR: 89 BPM
STRESS STAGE RECOVERY 1 BP: NORMAL MMHG
STRESS STAGE RECOVERY 1 DURATION: 1 MIN:SEC
STRESS STAGE RECOVERY 1 HR: 88 BPM
STRESS STAGE RECOVERY 2 BP: NORMAL MMHG
STRESS STAGE RECOVERY 2 DURATION: 1 MIN:SEC
STRESS STAGE RECOVERY 2 HR: 86 BPM
STRESS STAGE RECOVERY 3 BP: NORMAL MMHG
STRESS STAGE RECOVERY 3 DURATION: 1 MIN:SEC
STRESS STAGE RECOVERY 3 HR: 85 BPM
STRESS STAGE RECOVERY 4 BP: NORMAL MMHG
STRESS STAGE RECOVERY 4 DURATION: 1 MIN:SEC
STRESS STAGE RECOVERY 4 HR: 83 BPM
STRESS TARGET HR: 153 BPM
TID: 0.69
VENTILATION MODE VENT: ABNORMAL

## 2025-02-26 PROCEDURE — 86140 C-REACTIVE PROTEIN: CPT

## 2025-02-26 PROCEDURE — 93010 ELECTROCARDIOGRAM REPORT: CPT | Performed by: NUCLEAR MEDICINE

## 2025-02-26 PROCEDURE — 36415 COLL VENOUS BLD VENIPUNCTURE: CPT

## 2025-02-26 PROCEDURE — G0378 HOSPITAL OBSERVATION PER HR: HCPCS

## 2025-02-26 PROCEDURE — 80048 BASIC METABOLIC PNL TOTAL CA: CPT

## 2025-02-26 PROCEDURE — 82330 ASSAY OF CALCIUM: CPT

## 2025-02-26 PROCEDURE — 99223 1ST HOSP IP/OBS HIGH 75: CPT | Performed by: NURSE PRACTITIONER

## 2025-02-26 PROCEDURE — 6360000002 HC RX W HCPCS: Performed by: INTERNAL MEDICINE

## 2025-02-26 PROCEDURE — A9500 TC99M SESTAMIBI: HCPCS | Performed by: INTERNAL MEDICINE

## 2025-02-26 PROCEDURE — 6370000000 HC RX 637 (ALT 250 FOR IP): Performed by: NURSE PRACTITIONER

## 2025-02-26 PROCEDURE — 2500000003 HC RX 250 WO HCPCS: Performed by: NURSE PRACTITIONER

## 2025-02-26 PROCEDURE — 6360000002 HC RX W HCPCS: Performed by: NURSE PRACTITIONER

## 2025-02-26 PROCEDURE — 78452 HT MUSCLE IMAGE SPECT MULT: CPT | Performed by: INTERNAL MEDICINE

## 2025-02-26 PROCEDURE — 99223 1ST HOSP IP/OBS HIGH 75: CPT | Performed by: INTERNAL MEDICINE

## 2025-02-26 PROCEDURE — 93017 CV STRESS TEST TRACING ONLY: CPT

## 2025-02-26 PROCEDURE — 82803 BLOOD GASES ANY COMBINATION: CPT

## 2025-02-26 PROCEDURE — 3430000000 HC RX DIAGNOSTIC RADIOPHARMACEUTICAL: Performed by: INTERNAL MEDICINE

## 2025-02-26 PROCEDURE — 93005 ELECTROCARDIOGRAM TRACING: CPT | Performed by: EMERGENCY MEDICINE

## 2025-02-26 PROCEDURE — 6370000000 HC RX 637 (ALT 250 FOR IP)

## 2025-02-26 PROCEDURE — 36600 WITHDRAWAL OF ARTERIAL BLOOD: CPT

## 2025-02-26 PROCEDURE — 78452 HT MUSCLE IMAGE SPECT MULT: CPT

## 2025-02-26 PROCEDURE — 93016 CV STRESS TEST SUPVJ ONLY: CPT | Performed by: INTERNAL MEDICINE

## 2025-02-26 PROCEDURE — 96372 THER/PROPH/DIAG INJ SC/IM: CPT

## 2025-02-26 PROCEDURE — 96375 TX/PRO/DX INJ NEW DRUG ADDON: CPT

## 2025-02-26 PROCEDURE — 83690 ASSAY OF LIPASE: CPT

## 2025-02-26 PROCEDURE — 93018 CV STRESS TEST I&R ONLY: CPT | Performed by: INTERNAL MEDICINE

## 2025-02-26 PROCEDURE — 96376 TX/PRO/DX INJ SAME DRUG ADON: CPT

## 2025-02-26 RX ORDER — METOPROLOL TARTRATE 1 MG/ML
5 INJECTION, SOLUTION INTRAVENOUS EVERY 5 MIN PRN
Status: CANCELLED | OUTPATIENT
Start: 2025-02-26 | End: 2025-02-26

## 2025-02-26 RX ORDER — POLYETHYLENE GLYCOL 3350 17 G/17G
17 POWDER, FOR SOLUTION ORAL DAILY PRN
Status: DISCONTINUED | OUTPATIENT
Start: 2025-02-26 | End: 2025-02-28 | Stop reason: HOSPADM

## 2025-02-26 RX ORDER — ASPIRIN 81 MG/1
81 TABLET ORAL DAILY
Status: DISCONTINUED | OUTPATIENT
Start: 2025-02-26 | End: 2025-02-28 | Stop reason: HOSPADM

## 2025-02-26 RX ORDER — SUCRALFATE 1 G/1
1 TABLET ORAL
Status: DISCONTINUED | OUTPATIENT
Start: 2025-02-26 | End: 2025-02-28 | Stop reason: HOSPADM

## 2025-02-26 RX ORDER — ACETAMINOPHEN 325 MG/1
650 TABLET ORAL EVERY 6 HOURS PRN
Status: DISCONTINUED | OUTPATIENT
Start: 2025-02-26 | End: 2025-02-28 | Stop reason: HOSPADM

## 2025-02-26 RX ORDER — SODIUM CHLORIDE 0.9 % (FLUSH) 0.9 %
5-40 SYRINGE (ML) INJECTION PRN
Status: CANCELLED | OUTPATIENT
Start: 2025-02-26 | End: 2025-02-26

## 2025-02-26 RX ORDER — ONDANSETRON 4 MG/1
4 TABLET, ORALLY DISINTEGRATING ORAL EVERY 8 HOURS PRN
Status: DISCONTINUED | OUTPATIENT
Start: 2025-02-26 | End: 2025-02-28 | Stop reason: HOSPADM

## 2025-02-26 RX ORDER — SODIUM CHLORIDE 0.9 % (FLUSH) 0.9 %
5-40 SYRINGE (ML) INJECTION EVERY 12 HOURS SCHEDULED
Status: DISCONTINUED | OUTPATIENT
Start: 2025-02-26 | End: 2025-02-28 | Stop reason: HOSPADM

## 2025-02-26 RX ORDER — SODIUM CHLORIDE 9 MG/ML
500 INJECTION, SOLUTION INTRAVENOUS CONTINUOUS PRN
Status: CANCELLED | OUTPATIENT
Start: 2025-02-26 | End: 2025-02-26

## 2025-02-26 RX ORDER — SODIUM CHLORIDE 0.9 % (FLUSH) 0.9 %
5-40 SYRINGE (ML) INJECTION PRN
Status: DISCONTINUED | OUTPATIENT
Start: 2025-02-26 | End: 2025-02-28 | Stop reason: HOSPADM

## 2025-02-26 RX ORDER — NITROGLYCERIN 0.4 MG/1
0.4 TABLET SUBLINGUAL EVERY 5 MIN PRN
Status: DISCONTINUED | OUTPATIENT
Start: 2025-02-26 | End: 2025-02-28 | Stop reason: HOSPADM

## 2025-02-26 RX ORDER — LACTOBACILLUS RHAMNOSUS GG 10B CELL
1 CAPSULE ORAL 3 TIMES DAILY
Status: DISCONTINUED | OUTPATIENT
Start: 2025-02-26 | End: 2025-02-28 | Stop reason: HOSPADM

## 2025-02-26 RX ORDER — AMINOPHYLLINE 25 MG/ML
50 INJECTION, SOLUTION INTRAVENOUS PRN
Status: CANCELLED | OUTPATIENT
Start: 2025-02-26 | End: 2025-02-26

## 2025-02-26 RX ORDER — TAMSULOSIN HYDROCHLORIDE 0.4 MG/1
0.4 CAPSULE ORAL NIGHTLY
Status: DISCONTINUED | OUTPATIENT
Start: 2025-02-26 | End: 2025-02-28 | Stop reason: HOSPADM

## 2025-02-26 RX ORDER — ISOSORBIDE MONONITRATE 60 MG/1
60 TABLET, EXTENDED RELEASE ORAL DAILY
Status: DISCONTINUED | OUTPATIENT
Start: 2025-02-26 | End: 2025-02-27

## 2025-02-26 RX ORDER — TETRAKIS(2-METHOXYISOBUTYLISOCYANIDE)COPPER(I) TETRAFLUOROBORATE 1 MG/ML
35 INJECTION, POWDER, LYOPHILIZED, FOR SOLUTION INTRAVENOUS
Status: COMPLETED | OUTPATIENT
Start: 2025-02-26 | End: 2025-02-26

## 2025-02-26 RX ORDER — ENOXAPARIN SODIUM 100 MG/ML
40 INJECTION SUBCUTANEOUS DAILY
Status: DISCONTINUED | OUTPATIENT
Start: 2025-02-26 | End: 2025-02-28 | Stop reason: HOSPADM

## 2025-02-26 RX ORDER — SODIUM CHLORIDE 9 MG/ML
INJECTION, SOLUTION INTRAVENOUS PRN
Status: DISCONTINUED | OUTPATIENT
Start: 2025-02-26 | End: 2025-02-28 | Stop reason: HOSPADM

## 2025-02-26 RX ORDER — PRAVASTATIN SODIUM 40 MG
40 TABLET ORAL NIGHTLY
Status: DISCONTINUED | OUTPATIENT
Start: 2025-02-26 | End: 2025-02-28 | Stop reason: HOSPADM

## 2025-02-26 RX ORDER — FINASTERIDE 5 MG/1
5 TABLET, FILM COATED ORAL DAILY
Status: DISCONTINUED | OUTPATIENT
Start: 2025-02-26 | End: 2025-02-28 | Stop reason: HOSPADM

## 2025-02-26 RX ORDER — CLOPIDOGREL BISULFATE 75 MG/1
75 TABLET ORAL DAILY
Status: DISCONTINUED | OUTPATIENT
Start: 2025-02-26 | End: 2025-02-28 | Stop reason: HOSPADM

## 2025-02-26 RX ORDER — ACETAMINOPHEN 650 MG/1
650 SUPPOSITORY RECTAL EVERY 6 HOURS PRN
Status: DISCONTINUED | OUTPATIENT
Start: 2025-02-26 | End: 2025-02-28 | Stop reason: HOSPADM

## 2025-02-26 RX ORDER — NITROGLYCERIN 0.4 MG/1
0.4 TABLET SUBLINGUAL EVERY 5 MIN PRN
Status: CANCELLED | OUTPATIENT
Start: 2025-02-26

## 2025-02-26 RX ORDER — PANTOPRAZOLE SODIUM 40 MG/1
40 TABLET, DELAYED RELEASE ORAL DAILY
Status: DISCONTINUED | OUTPATIENT
Start: 2025-02-26 | End: 2025-02-28 | Stop reason: HOSPADM

## 2025-02-26 RX ORDER — TOPIRAMATE 100 MG/1
100 TABLET, FILM COATED ORAL 2 TIMES DAILY
Status: DISCONTINUED | OUTPATIENT
Start: 2025-02-26 | End: 2025-02-28 | Stop reason: HOSPADM

## 2025-02-26 RX ORDER — TROSPIUM CHLORIDE 20 MG/1
20 TABLET, FILM COATED ORAL
Status: DISCONTINUED | OUTPATIENT
Start: 2025-02-26 | End: 2025-02-28 | Stop reason: HOSPADM

## 2025-02-26 RX ORDER — NITROGLYCERIN 0.4 MG/1
0.4 TABLET SUBLINGUAL EVERY 5 MIN PRN
Status: CANCELLED | OUTPATIENT
Start: 2025-02-26 | End: 2025-02-26

## 2025-02-26 RX ORDER — ZOLPIDEM TARTRATE 5 MG/1
10 TABLET ORAL NIGHTLY PRN
Status: DISCONTINUED | OUTPATIENT
Start: 2025-02-26 | End: 2025-02-28 | Stop reason: HOSPADM

## 2025-02-26 RX ORDER — LEVOTHYROXINE SODIUM 88 UG/1
88 TABLET ORAL DAILY
Status: DISCONTINUED | OUTPATIENT
Start: 2025-02-26 | End: 2025-02-28 | Stop reason: HOSPADM

## 2025-02-26 RX ORDER — POTASSIUM CHLORIDE 1500 MG/1
40 TABLET, EXTENDED RELEASE ORAL PRN
Status: ACTIVE | OUTPATIENT
Start: 2025-02-26 | End: 2025-02-28

## 2025-02-26 RX ORDER — CITALOPRAM HYDROBROMIDE 40 MG/1
40 TABLET ORAL DAILY
Status: DISCONTINUED | OUTPATIENT
Start: 2025-02-26 | End: 2025-02-28 | Stop reason: HOSPADM

## 2025-02-26 RX ORDER — REGADENOSON 0.08 MG/ML
0.4 INJECTION, SOLUTION INTRAVENOUS
Status: CANCELLED | OUTPATIENT
Start: 2025-02-26

## 2025-02-26 RX ORDER — TETRAKIS(2-METHOXYISOBUTYLISOCYANIDE)COPPER(I) TETRAFLUOROBORATE 1 MG/ML
9.4 INJECTION, POWDER, LYOPHILIZED, FOR SOLUTION INTRAVENOUS
Status: COMPLETED | OUTPATIENT
Start: 2025-02-26 | End: 2025-02-26

## 2025-02-26 RX ORDER — ALBUTEROL SULFATE 90 UG/1
2 INHALANT RESPIRATORY (INHALATION) PRN
Status: CANCELLED | OUTPATIENT
Start: 2025-02-26 | End: 2025-02-26

## 2025-02-26 RX ORDER — DOXEPIN HYDROCHLORIDE 25 MG/1
25 CAPSULE ORAL NIGHTLY
Status: DISCONTINUED | OUTPATIENT
Start: 2025-02-26 | End: 2025-02-28 | Stop reason: HOSPADM

## 2025-02-26 RX ORDER — CYCLOBENZAPRINE HCL 10 MG
10 TABLET ORAL 2 TIMES DAILY PRN
Status: DISCONTINUED | OUTPATIENT
Start: 2025-02-26 | End: 2025-02-28 | Stop reason: HOSPADM

## 2025-02-26 RX ORDER — ATROPINE SULFATE 0.1 MG/ML
0.5 INJECTION INTRAVENOUS EVERY 5 MIN PRN
Status: CANCELLED | OUTPATIENT
Start: 2025-02-26 | End: 2025-02-26

## 2025-02-26 RX ORDER — HYDROCODONE BITARTRATE AND ACETAMINOPHEN 5; 325 MG/1; MG/1
1 TABLET ORAL EVERY 6 HOURS PRN
Status: DISCONTINUED | OUTPATIENT
Start: 2025-02-26 | End: 2025-02-28 | Stop reason: HOSPADM

## 2025-02-26 RX ORDER — MAGNESIUM SULFATE IN WATER 40 MG/ML
2000 INJECTION, SOLUTION INTRAVENOUS PRN
Status: DISCONTINUED | OUTPATIENT
Start: 2025-02-26 | End: 2025-02-28 | Stop reason: HOSPADM

## 2025-02-26 RX ORDER — ONDANSETRON 2 MG/ML
4 INJECTION INTRAMUSCULAR; INTRAVENOUS EVERY 6 HOURS PRN
Status: DISCONTINUED | OUTPATIENT
Start: 2025-02-26 | End: 2025-02-28 | Stop reason: HOSPADM

## 2025-02-26 RX ORDER — QUINIDINE GLUCONATE 324 MG
324 TABLET, EXTENDED RELEASE ORAL
Status: DISCONTINUED | OUTPATIENT
Start: 2025-02-26 | End: 2025-02-28 | Stop reason: HOSPADM

## 2025-02-26 RX ORDER — AMINOPHYLLINE 25 MG/ML
75 INJECTION, SOLUTION INTRAVENOUS ONCE
Status: COMPLETED | OUTPATIENT
Start: 2025-02-26 | End: 2025-02-26

## 2025-02-26 RX ORDER — POTASSIUM CHLORIDE 7.45 MG/ML
10 INJECTION INTRAVENOUS PRN
Status: ACTIVE | OUTPATIENT
Start: 2025-02-26 | End: 2025-02-28

## 2025-02-26 RX ORDER — REGADENOSON 0.08 MG/ML
0.4 INJECTION, SOLUTION INTRAVENOUS
Status: COMPLETED | OUTPATIENT
Start: 2025-02-26 | End: 2025-02-26

## 2025-02-26 RX ADMIN — ONDANSETRON 4 MG: 2 INJECTION, SOLUTION INTRAMUSCULAR; INTRAVENOUS at 02:33

## 2025-02-26 RX ADMIN — ENOXAPARIN SODIUM 40 MG: 100 INJECTION SUBCUTANEOUS at 09:19

## 2025-02-26 RX ADMIN — ASPIRIN 81 MG: 81 TABLET, COATED ORAL at 09:19

## 2025-02-26 RX ADMIN — Medication 1 CAPSULE: at 19:53

## 2025-02-26 RX ADMIN — TOPIRAMATE 100 MG: 100 TABLET, FILM COATED ORAL at 19:54

## 2025-02-26 RX ADMIN — SUCRALFATE 1 G: 1 TABLET ORAL at 19:53

## 2025-02-26 RX ADMIN — QUETIAPINE FUMARATE 500 MG: 300 TABLET, EXTENDED RELEASE ORAL at 14:12

## 2025-02-26 RX ADMIN — HYDROCODONE BITARTRATE AND ACETAMINOPHEN 1 TABLET: 5; 325 TABLET ORAL at 14:10

## 2025-02-26 RX ADMIN — HYDROCODONE BITARTRATE AND ACETAMINOPHEN 1 TABLET: 5; 325 TABLET ORAL at 20:00

## 2025-02-26 RX ADMIN — SUCRALFATE 1 G: 1 TABLET ORAL at 06:49

## 2025-02-26 RX ADMIN — ACETAMINOPHEN 650 MG: 325 TABLET ORAL at 02:35

## 2025-02-26 RX ADMIN — REGADENOSON 0.4 MG: 0.08 INJECTION, SOLUTION INTRAVENOUS at 12:09

## 2025-02-26 RX ADMIN — CITALOPRAM 40 MG: 40 TABLET, FILM COATED ORAL at 14:13

## 2025-02-26 RX ADMIN — Medication 1 CAPSULE: at 14:11

## 2025-02-26 RX ADMIN — SODIUM CHLORIDE, PRESERVATIVE FREE 10 ML: 5 INJECTION INTRAVENOUS at 19:54

## 2025-02-26 RX ADMIN — Medication 9.4 MILLICURIE: at 11:24

## 2025-02-26 RX ADMIN — PRAVASTATIN SODIUM 40 MG: 40 TABLET ORAL at 19:53

## 2025-02-26 RX ADMIN — PANTOPRAZOLE SODIUM 40 MG: 40 TABLET, DELAYED RELEASE ORAL at 09:19

## 2025-02-26 RX ADMIN — SUCRALFATE 1 G: 1 TABLET ORAL at 15:16

## 2025-02-26 RX ADMIN — Medication 360 MG: at 14:11

## 2025-02-26 RX ADMIN — FINASTERIDE 5 MG: 5 TABLET, FILM COATED ORAL at 14:11

## 2025-02-26 RX ADMIN — Medication 3 MG: at 02:35

## 2025-02-26 RX ADMIN — ONDANSETRON 4 MG: 2 INJECTION, SOLUTION INTRAMUSCULAR; INTRAVENOUS at 09:19

## 2025-02-26 RX ADMIN — TOPIRAMATE 100 MG: 100 TABLET, FILM COATED ORAL at 14:11

## 2025-02-26 RX ADMIN — TAMSULOSIN HYDROCHLORIDE 0.4 MG: 0.4 CAPSULE ORAL at 19:53

## 2025-02-26 RX ADMIN — CLOPIDOGREL BISULFATE 75 MG: 75 TABLET ORAL at 09:19

## 2025-02-26 RX ADMIN — TROSPIUM CHLORIDE 20 MG: 20 TABLET, FILM COATED ORAL at 14:12

## 2025-02-26 RX ADMIN — AMINOPHYLLINE 75 MG: 25 INJECTION, SOLUTION INTRAVENOUS at 12:30

## 2025-02-26 RX ADMIN — Medication 3 MG: at 20:00

## 2025-02-26 RX ADMIN — TAMSULOSIN HYDROCHLORIDE 0.4 MG: 0.4 CAPSULE ORAL at 02:35

## 2025-02-26 RX ADMIN — DOXEPIN HYDROCHLORIDE 25 MG: 25 CAPSULE ORAL at 19:54

## 2025-02-26 RX ADMIN — TOPIRAMATE 100 MG: 100 TABLET, FILM COATED ORAL at 05:16

## 2025-02-26 RX ADMIN — Medication 35 MILLICURIE: at 12:06

## 2025-02-26 RX ADMIN — ISOSORBIDE MONONITRATE 60 MG: 60 TABLET, EXTENDED RELEASE ORAL at 15:16

## 2025-02-26 RX ADMIN — POLYETHYLENE GLYCOL 3350 17 G: 17 POWDER, FOR SOLUTION ORAL at 14:13

## 2025-02-26 ASSESSMENT — PAIN DESCRIPTION - DESCRIPTORS: DESCRIPTORS: DISCOMFORT

## 2025-02-26 ASSESSMENT — HEART SCORE: ECG: NORMAL

## 2025-02-26 ASSESSMENT — PAIN DESCRIPTION - PAIN TYPE: TYPE: ACUTE PAIN;CHRONIC PAIN

## 2025-02-26 ASSESSMENT — ENCOUNTER SYMPTOMS
SHORTNESS OF BREATH: 1
DIARRHEA: 0
VOMITING: 0
ABDOMINAL PAIN: 1
COUGH: 0
RHINORRHEA: 0
NAUSEA: 1
SORE THROAT: 0

## 2025-02-26 ASSESSMENT — PAIN SCALES - GENERAL
PAINLEVEL_OUTOF10: 3
PAINLEVEL_OUTOF10: 5
PAINLEVEL_OUTOF10: 4

## 2025-02-26 ASSESSMENT — PAIN DESCRIPTION - FREQUENCY: FREQUENCY: INTERMITTENT

## 2025-02-26 ASSESSMENT — PAIN DESCRIPTION - ONSET: ONSET: ON-GOING

## 2025-02-26 ASSESSMENT — PAIN DESCRIPTION - LOCATION
LOCATION: ABDOMEN;BACK
LOCATION: ABDOMEN

## 2025-02-26 ASSESSMENT — PAIN - FUNCTIONAL ASSESSMENT
PAIN_FUNCTIONAL_ASSESSMENT: NONE - DENIES PAIN
PAIN_FUNCTIONAL_ASSESSMENT: 0-10
PAIN_FUNCTIONAL_ASSESSMENT: NONE - DENIES PAIN
PAIN_FUNCTIONAL_ASSESSMENT: 0-10
PAIN_FUNCTIONAL_ASSESSMENT: PREVENTS OR INTERFERES SOME ACTIVE ACTIVITIES AND ADLS

## 2025-02-26 ASSESSMENT — PAIN DESCRIPTION - ORIENTATION: ORIENTATION: RIGHT;LEFT

## 2025-02-26 NOTE — ED TRIAGE NOTES
Pt comes to ED with c/o chest pain and nausea. Pt states that CP began at 1815 for which he took one nitro. Pt states that pain was relieved with the nitro. Pt states that he had nausea before the CP began. Pt reports eating oreos prior to nausea.

## 2025-02-26 NOTE — ED NOTES
ED to inpatient nurses report      Chief Complaint:  Chief Complaint   Patient presents with    Chest Pain    Nausea     Present to ED from: home    MOA:     LOC: alert and orientated to name, place, date  Mobility: Requires assistance * 1  Oxygen Baseline: room air    Current needs required: room air     Code Status:   Prior    What abnormal results were found and what did you give/do to treat them? See images  Any procedures or intervention occur? CT; CXR    Mental Status:  Level of Consciousness: Alert (0)    Psych Assessment:        Vitals:  Patient Vitals for the past 24 hrs:   BP Temp Temp src Pulse Resp SpO2 Height Weight   02/26/25 0055 (!) 168/89 -- -- 66 18 98 % -- --   02/25/25 2340 (!) 168/85 -- -- 71 24 96 % -- --   02/25/25 2212 (!) 178/76 -- -- 61 16 97 % -- --   02/25/25 2106 (!) 165/93 -- -- 66 20 94 % -- --   02/25/25 2038 (!) 170/93 -- -- 70 19 97 % -- --   02/25/25 1927 (!) 157/78 97.6 °F (36.4 °C) Oral 66 18 97 % 1.829 m (6') 77.6 kg (171 lb)        LDAs:   Peripheral IV 02/25/25 Distal;Left;Anterior Cephalic (Active)       Ambulatory Status:  No data recorded    Diagnosis:  DISPOSITION Decision To Admit 02/26/2025 12:54:43 AM   Final diagnoses:   Chest pain, unspecified type   Epigastric pain        Consults:  None     Pain Score:  Pain Assessment  Pain Assessment: 0-10  Pain Level: 4  Duffy-Baker Pain Rating: Hurts a little bit    C-SSRS:        Sepsis Screening:       Emre Fall Risk:       Swallow Screening        Preferred Language:   English      ALLERGIES     Morphine, Buspirone, Butalbital-apap-caffeine, Iodine, Sulfamethoxazole-trimethoprim, Caffeine, Percodan [oxycodone-aspirin], Tape [adhesive tape], Zyban [bupropion hcl], Dicyclomine hcl, and Oxycodone    SURGICAL HISTORY       Past Surgical History:   Procedure Laterality Date    ABDOMEN SURGERY      CARDIAC PROCEDURE N/A 11/29/2024    Left heart cath / coronary angiography performed by Gaston French MD at [facility] CARDIAC CATH LAB

## 2025-02-26 NOTE — ED NOTES
Pt resting in bed. Vitals assessed. Pt medicated per MAR. RR easy and unlabored. Pt call light in reach.

## 2025-02-26 NOTE — PROCEDURES
PROCEDURE NOTE  Date: 2/26/2025   Name: Luis Enrique Valdes  YOB: 1960    Procedures  EKG completed rapid response

## 2025-02-26 NOTE — ED NOTES
Pt repositioned on cot at this time. Pt given sip of water. Respirations even and unlabored. Call light within reach.Will monitor. No further needs voiced.

## 2025-02-26 NOTE — ED NOTES
Spoke with FREDY Swanson to approve pt transport to [facility]. Pt in stable condition at this time.

## 2025-02-26 NOTE — ED NOTES
Pt resting in bed with eyes open. Assisted to reposition in bed. No other needs at this time. Telemetry in place. Call light within reach.

## 2025-02-26 NOTE — ED NOTES
Pt medicated per MAR. Vitals assessed. RR easy and unlabored. Pt call light in reach. No other needs voiced.
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
## 2025-02-26 NOTE — CONSULTS
[facility]'s  Resident Consult Note    Patient's Name/Date of Birth: Luis Enrique Valdes / 1960 (65 y.o.)    Date: February 26, 2025     Referring Provider: Joao Ortega MD    Consulted for: atypical chest pain       HPI: This is a pleasant 65 y.o. male with medical history of COPD CAD HFPEF GERD HLD HTN hypothyroidism. He presents with chest pain, which initially started as nausea and epigastric pain.  Patient mentions episodes concerning for presyncope, noting episode when applying wallpaper, raised arms, and nearly passed out for split-second.  patient noted that chest pain was alleviated by nitroglycerin, however would return.    EKG/Echo: EKG sinus rhythm, rate 64,  wide, QTc 480 long.  Echo EF 55%, normal LV thickness wall motion.  Diastolic dysfunction present 11/2024.    All labs, EKG's, diagnostic testing and images as well as cardiac cath, stress testing were reviewed during this encounter    Past Medical History:   Diagnosis Date    Angina at rest     intermittent    Anxiety     Anxiety     Arthritis     Asperger's disorder     Asthma     CAD (coronary artery disease)     Cervical radiculopathy     Chest pain     CHF (congestive heart failure) (HCC)     COPD (chronic obstructive pulmonary disease) (HCC)     Depression     Depression     Gall stones     GERD (gastroesophageal reflux disease)     Hyperlipidemia     Hypertension     Mentally challenged     Seizures (HCC)     Thyroid disease     Tremor      Past Surgical History:   Procedure Laterality Date    ABDOMEN SURGERY      CARDIAC PROCEDURE N/A 11/29/2024    Left heart cath / coronary angiography performed by Gaston French MD at [facility] CARDIAC CATH LAB    CARDIAC SURGERY      Heart Cath    CHOLECYSTECTOMY  2002    CHOLECYSTECTOMY, LAPAROSCOPIC      COLONOSCOPY      COLONOSCOPY      DILATATION, ESOPHAGUS      ENDOSCOPY, COLON, DIAGNOSTIC      ESOPHAGEAL DILATATION      KNEE ARTHROSCOPY Left 1984    NECK SURGERY   melatonin tablet 3 mg  3 mg Oral Nightly PRN Hoda Beaver APRN - CNP   3 mg at 02/26/25 0235    aspirin EC tablet 81 mg  81 mg Oral Daily Hoda Beaver APRN - CNP   81 mg at 02/26/25 0919    citalopram (CELEXA) tablet 40 mg  40 mg Oral Daily Hoda Beaver APRN - CNP        clopidogrel (PLAVIX) tablet 75 mg  75 mg Oral Daily Hoda Beaver APRN - CNP   75 mg at 02/26/25 0919    cyclobenzaprine (FLEXERIL) tablet 10 mg  10 mg Oral BID PRN Hoda Beaver APRN - CNP        doxepin (SINEQUAN) capsule 25 mg  25 mg Oral Nightly Hoda Beaver APRN - CNP        ferrous gluconate (FERGON) tablet 360 mg  360 mg Oral Daily with breakfast Hoda Beaver APRN - CNP        finasteride (PROSCAR) tablet 5 mg  5 mg Oral Daily Hoda Beaver APRN - CNP        HYDROcodone-acetaminophen (NORCO) 5-325 MG per tablet 1 tablet  1 tablet Oral Q6H PRN Hoda Beaver APRN - CNP        lactobacillus (CULTURELLE) capsule 1 capsule  1 capsule Oral TID Hoda Beaver APRN - CNP        nitroGLYCERIN (NITROSTAT) SL tablet 0.4 mg  0.4 mg SubLINGual Q5 Min PRN Hoda Beaver APRN - CNP        pantoprazole (PROTONIX) tablet 40 mg  40 mg Oral Daily Hoda Beaver APRN - CNP   40 mg at 02/26/25 0919    pravastatin (PRAVACHOL) tablet 40 mg  40 mg Oral Nightly Hoda Beaver APRN - CNP        QUEtiapine (SEROQUEL XR) extended release tablet 500 mg  500 mg Oral Daily Hoda Beaver APRN - CNP        trospium (SANCTURA) tablet 20 mg  20 mg Oral BID AC Hoda Beaver APRN - CNP        sucralfate (CARAFATE) tablet 1 g  1 g Oral 4x Daily AC & HS Hoda Beaver APRN - CNP   1 g at 02/26/25 0649    levothyroxine (SYNTHROID) tablet 88 mcg  88 mcg Oral Daily Hoda Beaver, ELLIE - CNP         extra 1/2 tablet one day/week (Patient taking differently: Take 1 tablet by mouth Daily Take one tablet 7 days/week with extra 1/2 tablet one day/week (Wednesday)) 30 tablet 5    lactobacillus acidophilus (FLORANEX) Take 1 tablet by mouth 3 times daily      topiramate (TOPAMAX) 100 MG tablet Take 1 tablet by mouth 2 times daily      pravastatin (PRAVACHOL) 40 MG tablet Take 1 tablet by mouth nightly      vitamin D (CHOLECALCIFEROL) 1000 UNIT TABS tablet Take 1 tablet by mouth daily. 30 tablet 1    citalopram (CELEXA) 40 MG tablet Take 1 tablet by mouth daily. 30 tablet 1    nitroGLYCERIN (NITROSTAT) 0.4 MG SL tablet Place 1 tablet under the tongue every 5 minutes as needed for Chest pain. 25 tablet 1    polyethylene glycol (GLYCOLAX) packet Take 17 g by mouth 2 times daily. 1200 g 11    pantoprazole (PROTONIX) 40 MG tablet Take 1 tablet by mouth daily      Montelukast Sodium (SINGULAIR PO)   Take 10 mg by mouth daily       MUCUS RELIEF 600 MG extended release tablet        Prior to Admission medications    Medication Sig Start Date End Date Taking? Authorizing Provider   cyclobenzaprine (FLEXERIL) 10 MG tablet Take 1 tablet by mouth 2 times daily as needed for Muscle spasms   Yes ProviderGlendy MD   ondansetron (ZOFRAN) 8 MG tablet Take 1 tablet by mouth 2 times daily as needed 12/20/24  Yes ProviderGlendy MD   traMADol (ULTRAM) 50 MG tablet Take 1 tablet by mouth every 6 hours as needed for Pain.   Yes ProviderGlendy MD   STOOL SOFTENER 100 MG capsule  12/1/24  Yes ProviderGlendy MD   LORazepam (ATIVAN) 1 MG tablet Take 1 tablet by mouth. 12/13/24  Yes ProviderGlendy MD   zolpidem (AMBIEN) 10 MG tablet Take 1 tablet by mouth nightly as needed. 12/11/24  Yes ProviderGlendy MD   QUEtiapine (SEROQUEL) 100 MG tablet Take 5 tablets by mouth daily 12/1/24  Yes Ricco Hutchins MD   sucralfate (CARAFATE) 1 GM tablet Take 1 tablet by mouth 4 times daily (before meals and nightly)

## 2025-02-26 NOTE — ED NOTES
Assisted pt to clean up and gandhi linens after bowel movement. No other needs at this time. Telemetry in place. Call light within reach.

## 2025-02-26 NOTE — ED NOTES
Pt resting in bed. Vitals assessed. RR easy and unlabored. Pt denies any needs. Call light in reach.

## 2025-02-26 NOTE — ED NOTES
Pt states abdominal pain is \"somewhat back\" rated 3/10 at this time. Pt also states he is nauseous. Vitals assessed. RR easy and unlabored. Report from Shruti PEMBERTON. Assumed care at this time.

## 2025-02-26 NOTE — ED NOTES
Pt resting in bed with eyes closed. Respirations are even and unlabored. Telemetry is in place. Call light is within reach.

## 2025-02-26 NOTE — ED PROVIDER NOTES
times daily as needed for Muscle spasms    DOXEPIN (SINEQUAN) 25 MG CAPSULE    Take 1 capsule by mouth nightly    FERROUS GLUCONATE (FERGON) 324 (38 FE) MG TABLET    Take 1 tablet by mouth daily (with breakfast)    FINASTERIDE (PROSCAR) 5 MG TABLET    Take 1 tablet by mouth daily    HYDROCODONE-ACETAMINOPHEN (NORCO) 5-325 MG PER TABLET    Take 1 tablet by mouth every 6 hours as needed for Pain.    LACTOBACILLUS ACIDOPHILUS (FLORANEX)    Take 1 tablet by mouth 3 times daily    LORAZEPAM (ATIVAN) 1 MG TABLET    Take 1 tablet by mouth.    MONTELUKAST SODIUM (SINGULAIR PO)      Take 10 mg by mouth daily     MUCUS RELIEF 600 MG EXTENDED RELEASE TABLET        NITROGLYCERIN (NITROSTAT) 0.4 MG SL TABLET    Place 1 tablet under the tongue every 5 minutes as needed for Chest pain.    ONDANSETRON (ZOFRAN) 8 MG TABLET    Take 1 tablet by mouth 2 times daily as needed    PANTOPRAZOLE (PROTONIX) 40 MG TABLET    Take 1 tablet by mouth daily    POLYETHYLENE GLYCOL (GLYCOLAX) PACKET    Take 17 g by mouth 2 times daily.    PRAVASTATIN (PRAVACHOL) 40 MG TABLET    Take 1 tablet by mouth nightly    QUETIAPINE (SEROQUEL) 100 MG TABLET    Take 5 tablets by mouth daily    SOLIFENACIN (VESICARE) 10 MG TABLET    Take 1 tablet by mouth daily    STOOL SOFTENER 100 MG CAPSULE        SUCRALFATE (CARAFATE) 1 GM TABLET    Take 1 tablet by mouth 4 times daily (before meals and nightly)    SYNTHROID 88 MCG TABLET    Take one tablet 7 days/week with extra 1/2 tablet one day/week    TAMSULOSIN (FLOMAX) 0.4 MG CAPSULE    Take 1 capsule by mouth at bedtime    TOPIRAMATE (TOPAMAX) 100 MG TABLET    Take 1 tablet by mouth 2 times daily    TRAMADOL (ULTRAM) 50 MG TABLET    Take 1 tablet by mouth every 6 hours as needed for Pain. Max Daily Amount: 200 mg    VITAMIN D (CHOLECALCIFEROL) 1000 UNIT TABS TABLET    Take 1 tablet by mouth daily.    ZOLPIDEM (AMBIEN) 10 MG TABLET    Take 1 tablet by mouth nightly as needed.       ALLERGIES     is allergic to       Rate and Rhythm: Normal rate and regular rhythm.      Heart sounds: Normal heart sounds. No murmur heard.  Pulmonary:      Effort: Pulmonary effort is normal. No respiratory distress.      Breath sounds: Normal breath sounds. No stridor. No decreased breath sounds or wheezing.   Abdominal:      General: Bowel sounds are normal. There is no distension.      Palpations: Abdomen is soft. Abdomen is not rigid. There is no pulsatile mass.      Tenderness: There is no abdominal tenderness. There is no right CVA tenderness, left CVA tenderness, guarding or rebound. Negative signs include Painting's sign and McBurney's sign.      Hernia: No hernia is present.   Musculoskeletal:         General: Normal range of motion.      Cervical back: No rigidity. No muscular tenderness.      Comments: Movement normal as observed.  No signs of DVT   Lymphadenopathy:      Cervical: No cervical adenopathy.   Skin:     General: Skin is warm and dry.      Coloration: Skin is not pale.      Findings: No rash.   Neurological:      General: No focal deficit present.      Mental Status: He is alert and oriented to person, place, and time.      Sensory: No sensory deficit.      Gait: Gait normal.   Psychiatric:         Attention and Perception: Attention normal.         Mood and Affect: Mood normal.         Speech: Speech normal.         Behavior: Behavior normal. Behavior is cooperative.         Thought Content: Thought content normal.         Cognition and Memory: Cognition normal.         DIFFERENTIAL DIAGNOSIS:   Including but not limited to: ACS, unstable angina, constipation, diverticulitis gastritis    Diagnoses Considered but I have low suspicion of:   AAA, TAA, PE    DIAGNOSTIC RESULTS     EKG: All [facility]'s [facility] who either signs or Co-signs this chart in the absence of a cardiologist.  Ventricular rate 64 bpm  LA interval 136 ms  QRS duration 130 ms  QTc interval 480 ms  P-R-T axes 51, -11,  46  Normal sinus rhythm.  No STEMI    RADIOLOGY: non-plain film images(s) such as CT,Ultrasound and MRI are read by the radiologist.  Plain radiographic images are visualized and preliminarily interpreted by the emergency physician unless otherwise stated below.  CT ABDOMEN PELVIS W IV CONTRAST Additional Contrast? None   Final Result   Impression:   1. Large amount of stool in the colon.   2. Fluid-filled small bowel loops without dilatation, could suggest    enteritis.      This document has been electronically signed by: Miguel Todd MD on    02/25/2025 10:32 PM      All CTs at this facility use dose modulation techniques and iterative    reconstructions, and/or weight-based dosing   when appropriate to reduce radiation to a low as reasonably achievable.      XR CHEST PORTABLE   Final Result   1. No acute findings.      This document has been electronically signed by: Evelyn Levi DO on    02/25/2025 08:16 PM          LABS:   Labs Reviewed   BASIC METABOLIC PANEL W/ REFLEX TO MG FOR LOW K - Abnormal; Notable for the following components:       Result Value    CO2 21 (*)     Glucose 113 (*)     All other components within normal limits   BRAIN NATRIURETIC PEPTIDE - Abnormal; Notable for the following components:    NT Pro-.0 (*)     All other components within normal limits   CBC WITH AUTO DIFFERENTIAL - Abnormal; Notable for the following components:    WBC 11.3 (*)     RBC 4.13 (*)     Hemoglobin 12.7 (*)     Hematocrit 39.7 (*)     MCV 96.1 (*)     MCHC 32.0 (*)     Neutrophils Absolute 9.3 (*)     Lymphocytes Absolute 0.9 (*)     All other components within normal limits   HEPATIC FUNCTION PANEL - Abnormal; Notable for the following components:    Total Bilirubin 0.2 (*)     All other components within normal limits   URINALYSIS WITH REFLEX TO CULTURE - Abnormal; Notable for the following components:    Specific Gravity, UA >1.030 (*)     All other components within normal limits   TROPONIN   LACTIC  ACID   ANION GAP   OSMOLALITY   GLOMERULAR FILTRATION RATE, ESTIMATED   TROPONIN   BASIC METABOLIC PANEL   LIPASE   C-REACTIVE PROTEIN   CALCIUM, IONIZED       EMERGENCY DEPARTMENT COURSE:   Vitals:    Vitals:    02/26/25 0055 02/26/25 0110 02/26/25 0238 02/26/25 0358   BP: (!) 168/89 (!) 161/84 (!) 162/89 (!) 161/77   Pulse: 66 66 80 82   Resp: 18 21 21 22   Temp:       TempSrc:       SpO2: 98% 96% 95% 96%   Weight:       Height:           Decision Rules/Clinical Scores utilized:   Heart Score for chest pain patients  History: Moderately Suspicious  ECG: Normal  Patient Age: > 65 years  *Risk factors for Atherosclerotic disease: Coronary Artery Disease, Positive family History, Hypercholesterolemia, Hypertension  Risk Factors: > 3 Risk factors or history of atherosclerotic disease*  Troponin: < 1X normal limit  Heart Score Total: 5           MDM:  The patient was seen by me in the emergency room for chest pain, dyspnea, nausea, and epigastric abdominal pain. Physical exam revealed a pleasant 65-year-old gentleman with normal exam.    Vital signs reviewed and noted stable.  Old records were reviewed.  Appropriate laboratory and imaging studies were ordered and reviewed upon completion.    Pertinent findings: CT shows: And suggests enteritis; troponin x 2 was 11    Interventions: Patient remained pain-free while here.  He was medicated with saline, Benadryl and Solu-Medrol due to IV dye allergy. I discussed this patient with my attending physician, Dr. Kendall, who aided in disposition    Reexamination: Patient remained stable.  He remained chest pain-free with good vital signs.    Results were discussed with the patient as well as desire for admission and they were amenable.  Richard Beaver NP of our hospitalists' service was consulted and graciously accepted admission. The patient was admitted to the hospital in fair condition.     CRITICAL CARE:   None    CONSULTS:  Richard Beaver NP

## 2025-02-26 NOTE — ED NOTES
Pt medicated per abdominal pain and nausea. Pt also provided with melatonin upon request. Vitals assessed. Pt call light in reach.

## 2025-02-26 NOTE — ED NOTES
Pt resting in bed with eyes closed. Respirations are even and unlabored. Telemetry in place. Call light is within reach.

## 2025-02-27 ENCOUNTER — APPOINTMENT (OUTPATIENT)
Age: 65
DRG: 292 | End: 2025-02-27
Payer: MEDICARE

## 2025-02-27 LAB
ECHO AV CUSP MM: 2.3 CM
ECHO BSA: 1.98 M2
ECHO LA DIAMETER INDEX: 2.16 CM/M2
ECHO LA DIAMETER: 4.3 CM
ECHO LV EDV A2C: 96 ML
ECHO LV EDV A4C: 104 ML
ECHO LV EDV INDEX A4C: 52 ML/M2
ECHO LV EDV NDEX A2C: 48 ML/M2
ECHO LV EF PHYSICIAN: 55 %
ECHO LV EJECTION FRACTION A2C: 59 %
ECHO LV EJECTION FRACTION A4C: 53 %
ECHO LV EJECTION FRACTION BIPLANE: 56 % (ref 55–100)
ECHO LV ESV A2C: 39 ML
ECHO LV ESV A4C: 49 ML
ECHO LV ESV INDEX A2C: 20 ML/M2
ECHO LV ESV INDEX A4C: 25 ML/M2
ECHO LV FRACTIONAL SHORTENING: 15 % (ref 28–44)
ECHO LV INTERNAL DIMENSION DIASTOLE INDEX: 1.96 CM/M2
ECHO LV INTERNAL DIMENSION DIASTOLIC: 3.9 CM (ref 4.2–5.9)
ECHO LV INTERNAL DIMENSION SYSTOLIC INDEX: 1.66 CM/M2
ECHO LV INTERNAL DIMENSION SYSTOLIC: 3.3 CM
ECHO LV IVSD: 1.2 CM (ref 0.6–1)
ECHO LV MASS 2D: 159.3 G (ref 88–224)
ECHO LV MASS INDEX 2D: 80 G/M2 (ref 49–115)
ECHO LV POSTERIOR WALL DIASTOLIC: 1.2 CM (ref 0.6–1)
ECHO LV RELATIVE WALL THICKNESS RATIO: 0.62
ECHO RV INTERNAL DIMENSION: 3.2 CM

## 2025-02-27 PROCEDURE — 99232 SBSQ HOSP IP/OBS MODERATE 35: CPT | Performed by: PHYSICIAN ASSISTANT

## 2025-02-27 PROCEDURE — 2580000003 HC RX 258

## 2025-02-27 PROCEDURE — 93307 TTE W/O DOPPLER COMPLETE: CPT

## 2025-02-27 PROCEDURE — G0378 HOSPITAL OBSERVATION PER HR: HCPCS

## 2025-02-27 PROCEDURE — 96361 HYDRATE IV INFUSION ADD-ON: CPT

## 2025-02-27 PROCEDURE — 93307 TTE W/O DOPPLER COMPLETE: CPT | Performed by: INTERNAL MEDICINE

## 2025-02-27 PROCEDURE — 6360000002 HC RX W HCPCS: Performed by: NURSE PRACTITIONER

## 2025-02-27 PROCEDURE — 2580000003 HC RX 258: Performed by: PHYSICIAN ASSISTANT

## 2025-02-27 PROCEDURE — 99232 SBSQ HOSP IP/OBS MODERATE 35: CPT | Performed by: HOSPITALIST

## 2025-02-27 PROCEDURE — 96372 THER/PROPH/DIAG INJ SC/IM: CPT

## 2025-02-27 PROCEDURE — 6370000000 HC RX 637 (ALT 250 FOR IP): Performed by: HOSPITALIST

## 2025-02-27 PROCEDURE — 6370000000 HC RX 637 (ALT 250 FOR IP): Performed by: NURSE PRACTITIONER

## 2025-02-27 RX ORDER — SODIUM CHLORIDE 9 MG/ML
INJECTION, SOLUTION INTRAVENOUS CONTINUOUS
Status: DISCONTINUED | OUTPATIENT
Start: 2025-02-27 | End: 2025-02-28 | Stop reason: HOSPADM

## 2025-02-27 RX ORDER — 0.9 % SODIUM CHLORIDE 0.9 %
250 INTRAVENOUS SOLUTION INTRAVENOUS ONCE
Status: COMPLETED | OUTPATIENT
Start: 2025-02-27 | End: 2025-02-27

## 2025-02-27 RX ORDER — 0.9 % SODIUM CHLORIDE 0.9 %
500 INTRAVENOUS SOLUTION INTRAVENOUS ONCE
Status: COMPLETED | OUTPATIENT
Start: 2025-02-27 | End: 2025-02-27

## 2025-02-27 RX ORDER — DOCUSATE SODIUM 100 MG/1
100 CAPSULE, LIQUID FILLED ORAL 2 TIMES DAILY PRN
Status: DISCONTINUED | OUTPATIENT
Start: 2025-02-27 | End: 2025-02-28 | Stop reason: HOSPADM

## 2025-02-27 RX ADMIN — TROSPIUM CHLORIDE 20 MG: 20 TABLET, FILM COATED ORAL at 10:03

## 2025-02-27 RX ADMIN — Medication 360 MG: at 10:03

## 2025-02-27 RX ADMIN — SUCRALFATE 1 G: 1 TABLET ORAL at 15:07

## 2025-02-27 RX ADMIN — SUCRALFATE 1 G: 1 TABLET ORAL at 20:31

## 2025-02-27 RX ADMIN — Medication 1 CAPSULE: at 15:02

## 2025-02-27 RX ADMIN — LEVOTHYROXINE SODIUM 88 MCG: 0.09 TABLET ORAL at 06:12

## 2025-02-27 RX ADMIN — DOXEPIN HYDROCHLORIDE 25 MG: 25 CAPSULE ORAL at 20:31

## 2025-02-27 RX ADMIN — CITALOPRAM 40 MG: 40 TABLET, FILM COATED ORAL at 10:03

## 2025-02-27 RX ADMIN — PANTOPRAZOLE SODIUM 40 MG: 40 TABLET, DELAYED RELEASE ORAL at 10:03

## 2025-02-27 RX ADMIN — TOPIRAMATE 100 MG: 100 TABLET, FILM COATED ORAL at 20:31

## 2025-02-27 RX ADMIN — ENOXAPARIN SODIUM 40 MG: 100 INJECTION SUBCUTANEOUS at 10:02

## 2025-02-27 RX ADMIN — Medication 1 CAPSULE: at 10:04

## 2025-02-27 RX ADMIN — SUCRALFATE 1 G: 1 TABLET ORAL at 06:11

## 2025-02-27 RX ADMIN — FINASTERIDE 5 MG: 5 TABLET, FILM COATED ORAL at 10:02

## 2025-02-27 RX ADMIN — TAMSULOSIN HYDROCHLORIDE 0.4 MG: 0.4 CAPSULE ORAL at 20:31

## 2025-02-27 RX ADMIN — CLOPIDOGREL BISULFATE 75 MG: 75 TABLET ORAL at 10:04

## 2025-02-27 RX ADMIN — DOCUSATE SODIUM 100 MG: 100 CAPSULE, LIQUID FILLED ORAL at 15:02

## 2025-02-27 RX ADMIN — TOPIRAMATE 100 MG: 100 TABLET, FILM COATED ORAL at 10:02

## 2025-02-27 RX ADMIN — SODIUM CHLORIDE: 9 INJECTION, SOLUTION INTRAVENOUS at 17:12

## 2025-02-27 RX ADMIN — SUCRALFATE 1 G: 1 TABLET ORAL at 10:02

## 2025-02-27 RX ADMIN — PRAVASTATIN SODIUM 40 MG: 40 TABLET ORAL at 20:31

## 2025-02-27 RX ADMIN — Medication 1 CAPSULE: at 20:31

## 2025-02-27 RX ADMIN — QUETIAPINE FUMARATE 500 MG: 300 TABLET, EXTENDED RELEASE ORAL at 10:02

## 2025-02-27 RX ADMIN — ASPIRIN 81 MG: 81 TABLET, COATED ORAL at 10:02

## 2025-02-27 RX ADMIN — TROSPIUM CHLORIDE 20 MG: 20 TABLET, FILM COATED ORAL at 15:07

## 2025-02-27 RX ADMIN — SODIUM CHLORIDE 500 ML: 0.9 INJECTION, SOLUTION INTRAVENOUS at 00:22

## 2025-02-27 RX ADMIN — TROSPIUM CHLORIDE 20 MG: 20 TABLET, FILM COATED ORAL at 06:11

## 2025-02-27 RX ADMIN — POLYETHYLENE GLYCOL 3350 17 G: 17 POWDER, FOR SOLUTION ORAL at 10:02

## 2025-02-27 RX ADMIN — SODIUM CHLORIDE 250 ML: 0.9 INJECTION, SOLUTION INTRAVENOUS at 15:06

## 2025-02-27 ASSESSMENT — PAIN DESCRIPTION - PAIN TYPE: TYPE: CHRONIC PAIN;ACUTE PAIN

## 2025-02-27 ASSESSMENT — PAIN DESCRIPTION - DESCRIPTORS: DESCRIPTORS: DISCOMFORT

## 2025-02-27 ASSESSMENT — PAIN - FUNCTIONAL ASSESSMENT: PAIN_FUNCTIONAL_ASSESSMENT: PREVENTS OR INTERFERES SOME ACTIVE ACTIVITIES AND ADLS

## 2025-02-27 ASSESSMENT — PAIN DESCRIPTION - ONSET: ONSET: ON-GOING

## 2025-02-27 ASSESSMENT — PAIN SCALES - GENERAL
PAINLEVEL_OUTOF10: 2
PAINLEVEL_OUTOF10: 0
PAINLEVEL_OUTOF10: 3

## 2025-02-27 ASSESSMENT — PAIN DESCRIPTION - FREQUENCY: FREQUENCY: INTERMITTENT

## 2025-02-27 ASSESSMENT — PAIN DESCRIPTION - ORIENTATION: ORIENTATION: RIGHT;LEFT

## 2025-02-27 ASSESSMENT — PAIN DESCRIPTION - LOCATION: LOCATION: ABDOMEN

## 2025-02-27 NOTE — PROGRESS NOTES
Patient's sister Johana called, updated on patient current condition. Sister stated that patient have frequent extreme anxiety attack that appears like patient is \"convulsing\". Patient have had multiple hospital visit/work up for it.

## 2025-02-27 NOTE — PLAN OF CARE
Problem: Discharge Planning  Goal: Discharge to home or other facility with appropriate resources  Outcome: Progressing       Consult received. Please see SW note dated 2/27.

## 2025-02-27 NOTE — PLAN OF CARE
Responded to rapid response while he was in the stress lab today earlier, patient had chest pain and markedly high pressures he completed first part of the nuclear stress test and is waiting for her second set of imaging.  Blood pressure improved with nitrates, does have history of orthostatic hypotension.   Patient was admitted early morning hours by the night hospitalist service H&P was noted.  Cardiology was consulted they recommended nuclear stress test today.  He did have negative cath basically nonobstructive back on 11/24 does have a history of COPD and heart failure with preserved ejection fraction, GERD and hypertension along with hypothyroidism.    EKG was done today which revealed no acute ST segment changes have seen several EKGs in the stress lab.  Cardiology recommended continuing the second set of nuclear scans.  Chest pain did come down to about 2-3 out of 10 after nitro was given, he was not diaphoretic pulse ox was normal.    Will follow-up medically.      Electronically signed by Joao Ortega MD on 2/26/2025 at 7:18 PM

## 2025-02-27 NOTE — PROGRESS NOTES
Cardiology Progress Note      Patient:  Luis Enrique Valdes  YOB: 1960  MRN: [id number]   Acct: [id number]  Admit Date:  2/25/2025  Primary Cardiologist: Umair Segura MD    Note per dr noguera \"Consulted for: atypical chest pain         HPI: This is a pleasant 65 y.o. male with medical history of COPD CAD HFPEF GERD HLD HTN hypothyroidism. He presents with chest pain, which initially started as nausea and epigastric pain.  Patient mentions episodes concerning for presyncope, noting episode when applying wallpaper, raised arms, and nearly passed out for split-second.  patient noted that chest pain was alleviated by nitroglycerin, however would return.\"    Subjective (Events in last 24 hours): pt awake and alert.  NAD. No cp or sob.  No edema or orthopnea.  Pt with some lightheadedness just laying in bed today.  He has had lightheadedness for past several years  On RA      Objective:   BP 91/69   Pulse 81   Temp 97.7 °F (36.5 °C) (Oral)   Resp 19   Ht 1.829 m (6')   Wt 77.6 kg (171 lb)   SpO2 94%   BMI 23.19 kg/m²        TELEMETRY: nsr    Physical Exam:  General Appearance: alert and oriented to person, place and time, in no acute distress  Cardiovascular: normal rate, regular rhythm, normal S1 and S2, no murmurs, rubs, clicks, or gallops, distal pulses intact, no carotid bruits, no JVD  Pulmonary/Chest: clear to auscultation bilaterally- no wheezes, rales or rhonchi, normal air movement, no respiratory distress  Abdomen: soft, non-tender, non-distended, normal bowel sounds, no masses Extremities: no cyanosis, clubbing or edema, pulse   Skin: warm and dry, no rash or erythema  Head: normocephalic and atraumatic  Eyes: pupils equal, round, and reactive to light  Neck: supple and non-tender without mass, no thyromegaly   Musculoskeletal: normal range of motion, no joint swelling, deformity or tenderness  Neurological: alert, oriented, normal speech, no focal findings or movement disorder  \"TROPONINI\" in the last 72 hours.    CBC:   Lab Results   Component Value Date/Time    WBC 11.3 02/25/2025 07:54 PM    RBC 4.13 02/25/2025 07:54 PM    RBC 4.35 11/16/2011 06:20 AM    HGB 12.7 02/25/2025 07:54 PM    HGB 14.2 11/16/2011 06:20 AM    HCT 39.7 02/25/2025 07:54 PM     02/25/2025 07:54 PM       CMP:    Lab Results   Component Value Date/Time     02/26/2025 04:55 AM    K 3.5 02/26/2025 04:55 AM    K 4.1 02/25/2025 07:54 PM     02/26/2025 04:55 AM    CO2 16 02/26/2025 04:55 AM    BUN 26 02/26/2025 04:55 AM    CREATININE 0.9 02/26/2025 04:55 AM    AGRATIO 1.5 08/30/2024 08:01 AM    LABGLOM > 90 02/26/2025 04:55 AM    LABGLOM 83 04/20/2024 09:25 AM    GLUCOSE 136 02/26/2025 04:55 AM    GLUCOSE 99 08/30/2024 08:01 AM    CALCIUM 7.4 02/26/2025 04:55 AM       Hepatic Function Panel:    Lab Results   Component Value Date/Time    ALKPHOS 77 02/25/2025 08:42 PM    ALT 24 02/25/2025 08:42 PM    AST 28 02/25/2025 08:42 PM    BILITOT 0.2 02/25/2025 08:42 PM    BILIDIR <0.1 02/25/2025 08:42 PM       Magnesium:    Lab Results   Component Value Date/Time    MG 2.1 12/29/2024 12:50 PM       PT/INR:    Lab Results   Component Value Date/Time    PROTIME 1.11 09/07/2011 07:11 AM    INR 1.10 11/28/2024 12:06 AM       HgBA1c:    Lab Results   Component Value Date/Time    LABA1C 5.7 11/28/2024 04:35 AM       FLP:    Lab Results   Component Value Date/Time    TRIG 44 11/28/2024 04:35 AM    HDL 51 11/28/2024 04:35 AM    LDLDIRECT 71 08/30/2024 08:01 AM       TSH:    Lab Results   Component Value Date/Time    TSH 1.570 12/29/2024 12:50 PM         Assessment:    Near syncope  Dizziness - ongoing for years  Abd pain - attending following  Chest pain - trop neg x2 - resolved  Neg stress test 2/26/25  CAD - s/p cath 11/2024 - Non-obstructive major epicardial coronary disease   HFpEF - compensated      Plan:    Check orthos  Cont asa/statin  TTE pending  If TTE wnl, will follow prn  F/up dr rizzo 4 weeks        Electronically signed by Negin Guzman PA-C on 2/27/2025 at 10:24 AM

## 2025-02-27 NOTE — CARE COORDINATION
2/27/25, 3:34 PM EST  Discharge Planning Evaluation  Social work consult received, patient from Michael BERRY.   Patient/Family preference is to return to assisted living, per patient.    Would patient be willing to go to a skilled facility if needed: Yes.  Is there skilled care available at current facility: No.  Barriers to return to current living situation: None  Spoke with Flakito PEMBERTON at the facility.  Patient bed hold: Yes  Anticipated transport plan: [place]  Patient's Healthcare Decision Maker: Legal Next of Kin- sisters  Spoke with NILAY Fraga, he is aware of potential discharge tomorrow.      02/27/25 1527   Service Assessment   Patient Orientation Alert and Oriented   Cognition Alert   History Provided By Patient   Primary Caregiver Other (Comment)  (From Michael Gilbert)   Support Systems Family Members   Patient's Healthcare Decision Maker is: Legal Next of Kin  (Siblings)   PCP Verified by CM Yes   Last Visit to PCP Within last 3 months   Prior Functional Level Cooking;Housework;Shopping;Mobility;Assistance with the following:   Current Functional Level Independent in ADLs/IADLs   Can patient return to prior living arrangement Yes   Ability to make needs known: Good   Family able to assist with home care needs: No   Would you like for me to discuss the discharge plan with any other family members/significant others, and if so, who? No   Financial Resources Medicare;Medicaid   Community Resources Transportation;Assisted Living   Discharge Planning   Type of Residence Assisted living   Living Arrangements Other (Comment)  (From [facility])   Current Services Prior To Admission Transportation;Extended Care Facility   Potential Assistance Needed Transportation;Extended Care Facility   DME Ordered? No   Potential Assistance Purchasing Medications No   Type of Home Care Services None   Patient expects to be discharged to: Assisted living   Services At/After Discharge   Services At/After Discharge Assisted  living;Transport;In [place]   [place] Resource Information Provided? No   Mode of Transport at Discharge  Van   Confirm Follow Up Transport Wheelchair Van   Condition of Participation: Discharge Planning   The Plan for Transition of Care is related to the following treatment goals: Stop chest pain   Freedom of Choice list was provided with basic dialogue that supports the patient's individualized plan of care/goals, treatment preferences, and shares the quality data associated with the providers?  No  (Return to [facility])

## 2025-02-27 NOTE — PROGRESS NOTES
Hospitalist Progress Note    Patient:  Luis Enrique Valdes      Unit/Bed:8B-30/030-A    YOB: 1960    MRN: [id number]       Acct: [id number]     PCP: Joseph Rose MD    Date of Admission: 2/25/2025    Assessment/Plan:    Atypical chest pain: Patient admitted for chest pain workup was rapid response during his stress test.  It was reported patient had \"convulsions\" however this is not new for patient as he does have panic attacks per family.  Stress test was negative.  2D echo showed preserved EF.  HFpEF: No acute exacerbation.  Uncontrolled hypertension: Patient has had significantly elevated blood pressures and now is hypotensive since starting Imdur.  Discussed with cardiology will check orthostatic vitals, likely will need to reduce or hold Imdur.  Hypothyroidism: Continue Synthroid  Seizure disorder: Continue with Topamax  GERD: PPI  Autism  Disposition: Anticipate DC back to facility tomorrow          Subjective (past 24 hours):   Patient seen and examined at bedside, states he is doing well last had no further chest pains or shortness of breath.  No new complaints or acute overnight events.  Patient states he has had longstanding history with hypertension in the past      Medications:  Reviewed    Infusion Medications    sodium chloride       Scheduled Medications    sodium chloride flush  5-40 mL IntraVENous 2 times per day    enoxaparin  40 mg SubCUTAneous Daily    aspirin  81 mg Oral Daily    citalopram  40 mg Oral Daily    clopidogrel  75 mg Oral Daily    doxepin  25 mg Oral Nightly    ferrous gluconate  360 mg Oral Daily with breakfast    finasteride  5 mg Oral Daily    lactobacillus  1 capsule Oral TID    pantoprazole  40 mg Oral Daily    pravastatin  40 mg Oral Nightly    QUEtiapine (SEROQUEL XR) extended release tablet 500 mg  500 mg Oral Daily    trospium  20 mg Oral BID AC    sucralfate  1 g Oral 4x Daily AC & HS    levothyroxine  88 mcg Oral Daily    tamsulosin  0.4 mg Oral Nightly     topiramate  100 mg Oral BID    isosorbide mononitrate  60 mg Oral Daily     PRN Meds: docusate sodium, sodium chloride flush, sodium chloride, potassium chloride **OR** potassium alternative oral replacement **OR** potassium chloride, magnesium sulfate, ondansetron **OR** ondansetron, polyethylene glycol, acetaminophen **OR** acetaminophen, melatonin, cyclobenzaprine, HYDROcodone-acetaminophen, nitroGLYCERIN, zolpidem      Intake/Output Summary (Last 24 hours) at 2/27/2025 1322  Last data filed at 2/27/2025 1132  Gross per 24 hour   Intake 200 ml   Output 950 ml   Net -750 ml       Diet:  Diet NPO    Exam:  BP 91/69   Pulse 70   Temp 97.7 °F (36.5 °C) (Oral)   Resp 19   Ht 1.829 m (6')   Wt 77.6 kg (171 lb)   SpO2 97%   BMI 23.19 kg/m²     General appearance: No apparent distress, appears stated age and cooperative.  Respiratory:  Normal respiratory effort. Clear to auscultation, bilaterally without Rales/Wheezes/Rhonchi.  Cardiovascular: Regular rate and rhythm with normal S1/S2 without murmurs, rubs or gallops.  Abdomen: Soft, non-tender, non-distended with normal bowel sounds.  Extremities: no pedal edema  Skin:  no rashes    Labs:   Recent Labs     02/25/25 1954   WBC 11.3*   HGB 12.7*   HCT 39.7*        Recent Labs     02/25/25 1954 02/26/25  0455    141   K 4.1 3.5    113*   CO2 21* 16*   BUN 22 26*   CREATININE 1.2 0.9   CALCIUM 9.1 7.4*     Recent Labs     02/25/25 2042   AST 28   ALT 24   BILIDIR <0.1   BILITOT 0.2*   ALKPHOS 77     No results for input(s): \"INR\" in the last 72 hours.  No results for input(s): \"CKTOTAL\", \"TROPONINI\" in the last 72 hours.  No results for input(s): \"PROCAL\" in the last 72 hours.    Microbiology:      Urinalysis:      Lab Results   Component Value Date/Time    NITRU NEGATIVE 02/25/2025 10:40 PM    WBCUA 0-2 12/12/2019 12:11 PM    BACTERIA NONE SEEN 12/12/2019 12:11 PM    RBCUA 3-5 12/12/2019 12:11 PM    BLOODU NEGATIVE 02/25/2025 10:40 PM     GLUCOSEU NEGATIVE 02/25/2025 10:40 PM       Radiology:  Nuclear stress test with myocardial perfusion    Result Date: 2/26/2025    Stress Combined Conclusion: Normal pharmacological myocardial perfusion study. Findings suggest a low risk of cardiac events.   Perfusion Comments: LV perfusion is normal. There is no evidence of inducible ischemia.   Perfusion Conclusion: TID ratio is 0.69.   ECG: Resting ECG demonstrates normal sinus rhythm and incomplete right bundle branch block.   Stress Test: A pharmacological stress test was performed using regadenoson (Lexiscan). Nitroglycerin given as a reversal agent.       DVT prophylaxis: [] Lovenox                                 [] SCDs                                 [] SQ Heparin                                 [] Encourage ambulation           [] Already on Anticoagulation     Code Status: Full Code    Tele:   [] yes             [] no    Active Hospital Problems    Diagnosis Date Noted    Atypical chest pain [R07.89] 11/29/2024       Electronically signed by Judson Sheth MD on 2/27/2025 at 1:22 PM

## 2025-02-28 VITALS
OXYGEN SATURATION: 96 % | WEIGHT: 171 LBS | DIASTOLIC BLOOD PRESSURE: 77 MMHG | HEIGHT: 72 IN | BODY MASS INDEX: 23.16 KG/M2 | HEART RATE: 57 BPM | TEMPERATURE: 98.2 F | SYSTOLIC BLOOD PRESSURE: 135 MMHG | RESPIRATION RATE: 18 BRPM

## 2025-02-28 PROCEDURE — 6370000000 HC RX 637 (ALT 250 FOR IP): Performed by: NURSE PRACTITIONER

## 2025-02-28 PROCEDURE — 99232 SBSQ HOSP IP/OBS MODERATE 35: CPT | Performed by: PHYSICIAN ASSISTANT

## 2025-02-28 PROCEDURE — 96372 THER/PROPH/DIAG INJ SC/IM: CPT

## 2025-02-28 PROCEDURE — G0378 HOSPITAL OBSERVATION PER HR: HCPCS

## 2025-02-28 PROCEDURE — 99239 HOSP IP/OBS DSCHRG MGMT >30: CPT | Performed by: STUDENT IN AN ORGANIZED HEALTH CARE EDUCATION/TRAINING PROGRAM

## 2025-02-28 PROCEDURE — 1200000000 HC SEMI PRIVATE

## 2025-02-28 PROCEDURE — 6360000002 HC RX W HCPCS: Performed by: NURSE PRACTITIONER

## 2025-02-28 RX ADMIN — ENOXAPARIN SODIUM 40 MG: 100 INJECTION SUBCUTANEOUS at 08:14

## 2025-02-28 RX ADMIN — Medication 360 MG: at 08:13

## 2025-02-28 RX ADMIN — LEVOTHYROXINE SODIUM 88 MCG: 0.09 TABLET ORAL at 05:35

## 2025-02-28 RX ADMIN — CITALOPRAM 40 MG: 40 TABLET, FILM COATED ORAL at 08:14

## 2025-02-28 RX ADMIN — ASPIRIN 81 MG: 81 TABLET, COATED ORAL at 08:13

## 2025-02-28 RX ADMIN — TROSPIUM CHLORIDE 20 MG: 20 TABLET, FILM COATED ORAL at 15:40

## 2025-02-28 RX ADMIN — TROSPIUM CHLORIDE 20 MG: 20 TABLET, FILM COATED ORAL at 05:35

## 2025-02-28 RX ADMIN — SUCRALFATE 1 G: 1 TABLET ORAL at 12:13

## 2025-02-28 RX ADMIN — Medication 1 CAPSULE: at 13:10

## 2025-02-28 RX ADMIN — QUETIAPINE FUMARATE 500 MG: 300 TABLET, EXTENDED RELEASE ORAL at 08:13

## 2025-02-28 RX ADMIN — CLOPIDOGREL BISULFATE 75 MG: 75 TABLET ORAL at 08:13

## 2025-02-28 RX ADMIN — Medication 1 CAPSULE: at 08:13

## 2025-02-28 RX ADMIN — FINASTERIDE 5 MG: 5 TABLET, FILM COATED ORAL at 08:14

## 2025-02-28 RX ADMIN — TOPIRAMATE 100 MG: 100 TABLET, FILM COATED ORAL at 08:13

## 2025-02-28 RX ADMIN — PANTOPRAZOLE SODIUM 40 MG: 40 TABLET, DELAYED RELEASE ORAL at 08:13

## 2025-02-28 RX ADMIN — SUCRALFATE 1 G: 1 TABLET ORAL at 05:35

## 2025-02-28 ASSESSMENT — PAIN SCALES - GENERAL: PAINLEVEL_OUTOF10: 0

## 2025-02-28 NOTE — DISCHARGE INSTR - COC
Continuity of Care Form    Patient Name: Luis Enrique Valdes   :  1960  MRN:  [id number]    Admit date:  2025  Discharge date:  ***    Code Status Order: Full Code   Advance Directives:   Advance Care Flowsheet Documentation             Admitting Physician:  Tr Dove MD  PCP: Joseph Rose MD    Discharging Nurse: ***  Discharging Hospital Unit/Room#: 8B-30/030-A  Discharging Unit Phone Number: ***    Emergency Contact:   Extended Emergency Contact Information  Primary Emergency Contact: [place] [address] [place] [facility]  Home Phone: [phone]  Mobile Phone: [phone]  Relation: Brother/Sister  Secondary Emergency Contact: Adela Small  Mobile Phone: [phone]  Relation: Brother/Sister  Preferred language: English   needed? No    Past Surgical History:  Past Surgical History:   Procedure Laterality Date    ABDOMEN SURGERY      CARDIAC PROCEDURE N/A 2024    Left heart cath / coronary angiography performed by Gaston French MD at [facility] CARDIAC CATH LAB    CARDIAC SURGERY      Heart Cath    CHOLECYSTECTOMY      CHOLECYSTECTOMY, LAPAROSCOPIC      COLONOSCOPY      COLONOSCOPY      DILATATION, ESOPHAGUS      ENDOSCOPY, COLON, DIAGNOSTIC      ESOPHAGEAL DILATATION      KNEE ARTHROSCOPY Left     NECK SURGERY  1996    plate in neck and fusion    SIGMOIDOSCOPY Left 2020    SIGMOIDOSCOPY DIAGNOSTIC FLEXIBLE performed by Magdi Aaron MD at [facility] Endoscopy    [facility]  2016    UPPER GASTROINTESTINAL ENDOSCOPY         Immunization History:   Immunization History   Administered Date(s) Administered    COVID-19, PFIZER PURPLE top, DILUTE for use, (age 12 y+), 30mcg/0.3mL 2021, 2021, 2021    Influenza Virus Vaccine 2012, 10/04/2013    Pneumococcal, PPSV23, PNEUMOVAX 23, (age 2y+), SC/IM, 0.5mL 10/04/2013       Active Problems:  Patient Active Problem List   Diagnosis Code    Pituitary adenoma (HCC) D35.2     Admin  {[facility] DME ADLs:707443853}  Med Delivery   { [facility] MED Delivery:250750324}    Wound Care Documentation and Therapy:        Elimination:  Continence:   Bowel: {YES / NO:}  Bladder: {YES / NO:}  Urinary Catheter: {Urinary Catheter:801726715}   Colostomy/Ileostomy/Ileal Conduit: {YES / NO:}       Date of Last BM: ***    Intake/Output Summary (Last 24 hours) at 2025 1348  Last data filed at 2025 0959  Gross per 24 hour   Intake 610 ml   Output 1125 ml   Net -515 ml     I/O last 3 completed shifts:  In: 760 [P.O.:760]  Out: 1500 [Urine:1500]    Safety Concerns:     { [facility] Safety Concerns:851926957}    Impairments/Disabilities:      { [facility] Impairments/Disabilities:726736069}    Nutrition Therapy:  Current Nutrition Therapy:   { [facility] Diet List:800450092}    Routes of Feeding: {[facility] Other Feedings:320866860}  Liquids: {Slp liquid thickness:43379}  Daily Fluid Restriction: {[facility] DME Yes amt example:582621230}  Last Modified Barium Swallow with Video (Video Swallowing Test): {Done Not Done Date:}    Treatments at the Time of Hospital Discharge:   Respiratory Treatments: ***  Oxygen Therapy:  {Therapy; copd oxygen:08211}  Ventilator:    {[facility] Vent List:345559497}    Rehab Therapies: {THERAPEUTIC INTERVENTION:8796086544}  Weight Bearing Status/Restrictions: {[facility] Weight Bearin}  Other Medical Equipment (for information only, NOT a DME order):  {EQUIPMENT:626332193}  Other Treatments: ***    Patient's personal belongings (please select all that are sent with patient):  {[facility] DME Belongings:664950546}    RN SIGNATURE:  {Esignature:237264925}    CASE MANAGEMENT/SOCIAL WORK SECTION    Inpatient Status Date: ***    Readmission Risk Assessment Score:  [facility] RISK OF UNPLANNED READMISSION 2.0             19.5 Total Score        Discharging to Facility/ Agency   Name:   Address:  Phone:  Fax:    Dialysis Facility (if applicable)   Name:  Address:  Dialysis

## 2025-02-28 NOTE — PROGRESS NOTES
Cardiology Progress Note      Patient:  Luis Enrique Valdes  YOB: 1960  MRN: [id number]   Acct: [id number]  Admit Date:  2/25/2025  Primary Cardiologist: Umair Segura MD    Note per dr noguera \"Consulted for: atypical chest pain         HPI: This is a pleasant 65 y.o. male with medical history of COPD CAD HFPEF GERD HLD HTN hypothyroidism. He presents with chest pain, which initially started as nausea and epigastric pain.  Patient mentions episodes concerning for presyncope, noting episode when applying wallpaper, raised arms, and nearly passed out for split-second.  patient noted that chest pain was alleviated by nitroglycerin, however would return.\"    Subjective (Events in last 24 hours):   Orthostatics 2/27/25 - supine 128/76, 70, sitting 87/59, 73, standing 75/61, 76  I ordered 250 bolus yesterday and then 0.9 at 75cc/hr    Today pt awake and alert.  NAD. No cp or sob.  No lightheadedness today.  No edema or orthopnea.  No complaints  On RA    Objective:   /77   Pulse 57   Temp 98.2 °F (36.8 °C) (Oral)   Resp 18   Ht 1.829 m (6')   Wt 77.6 kg (171 lb)   SpO2 96%   BMI 23.19 kg/m²        TELEMETRY: nsr    Physical Exam:  General Appearance: alert and oriented to person, place and time, in no acute distress  Cardiovascular: normal rate, regular rhythm, normal S1 and S2, no murmurs, rubs, clicks, or gallops, distal pulses intact, no carotid bruits, no JVD  Pulmonary/Chest: clear to auscultation bilaterally- no wheezes, rales or rhonchi, normal air movement, no respiratory distress  Abdomen: soft, non-tender, non-distended, normal bowel sounds, no masses Extremities: no cyanosis, clubbing or edema, pulse   Skin: warm and dry, no rash or erythema  Head: normocephalic and atraumatic  Eyes: pupils equal, round, and reactive to light  Neck: supple and non-tender without mass, no thyromegaly   Musculoskeletal: normal range of motion, no joint swelling, deformity or tenderness  Neurological:  years  Orthostatic hypotension  Abd pain - attending following  Chest pain - trop neg x2 - resolved  Neg stress test 2/26/25  CAD - s/p cath 11/2024 - Non-obstructive major epicardial coronary disease   HFpEF - compensated  Ef 50-55 per TTE 2/27/25      Plan:    Cont IVF  Noe hose, abd binder  Get up slowly during position changes  Daily orthos - todays not done  Cont asa/statin  will follow prn  F/up dr rizzo 4 weeks       Electronically signed by Negin Guzman PA-C on 2/28/2025 at 9:36 AM

## 2025-02-28 NOTE — CARE COORDINATION
2/28/25, 4:04 PM EST    Patient goals/plan/ treatment preferences discussed by  and .  Patient goals/plan/ treatment preferences reviewed with patient/ family.  Patient/ family verbalize understanding of discharge plan and are in agreement with goal/plan/treatment preferences.  Understanding was demonstrated using the teach back method.  AVS provided by RN at time of discharge, which includes all necessary medical information pertaining to the patients current course of illness, treatment, post-discharge goals of care, and treatment preferences.     Services At/After Discharge: Assisted Living and In ambulette     Patient discharged to return to [facility]  Messaged left for admission informing of discharge and 3:30 transport.  Faxed AVS and MAR, RN called report.  Spoke with patient and called sister Johana, informed of discharge and transport.

## 2025-02-28 NOTE — PLAN OF CARE
Problem: Chronic Conditions and Co-morbidities  Goal: Patient's chronic conditions and co-morbidity symptoms are monitored and maintained or improved  Outcome: Completed     Problem: Discharge Planning  Goal: Discharge to home or other facility with appropriate resources  Outcome: Completed     Problem: Safety - Adult  Goal: Free from fall injury  Outcome: Completed     Problem: Pain  Goal: Verbalizes/displays adequate comfort level or baseline comfort level  Outcome: Completed

## 2025-03-04 NOTE — DISCHARGE SUMMARY
Hospitalist Discharge Summary        Patient: Luis Enrique Valdes  YOB: 1960  MRN: [id number]   Acct: [id number]    Primary Care Physician: Joseph Rose MD    Admit date  2/25/2025    Discharge date:  2/28/2025 8:43 PM    Chief Complaint on presentation :-    Chest pain, nausea     Discharge Assessment and Plan:-     Atypical chest pain: Patient admitted for chest pain workup was rapid response during his stress test.  It was reported patient had \"convulsions\" however this is not new for patient as he does have panic attacks per family.  Stress test was negative.  2D echo showed preserved EF. Cleared for dc by cardiology team. F/up dr rizzo 4 weeks  HFpEF: No acute exacerbation.  Uncontrolled hypertension: Patient has had significantly elevated blood pressures and now is hypotensive since starting Imdur.  Discussed with cardiology will check orthostatic vitals, likely will need to reduce or hold Imdur.  Hypothyroidism: Continue Synthroid  Seizure disorder: Continue with Topamax  GERD: PPI  Autism    Initial H and P and Hospital course:-  Luis Enrique Valdes is a 65-year-old male presented to [facility] 2/25/2025 via EMS with chief complaint of chest pain and nausea.  Onset occurred at 1815 for which he took 1 SL NTG.  Patient was watching TV when he developed central chest pain.  This was associated with nausea, shortness of breath denied any diaphoresis.  Positive for epigastric abdominal pain.  EMS arrived 4 baby aspirin's were given and Zofran.  This did resolve his symptoms.  Patient identifies complaint of nausea prior to chest pain complaint after eating Oreos.        Physical Exam:-  Vitals:   No data found.  Weight:   Weight - Scale: 77.6 kg (171 lb)   24 hour intake/output:   No intake or output data in the 24 hours ending 03/03/25 2043    General appearance: No apparent distress, appears stated age and cooperative.   HEENT: Normal cephalic, atraumatic without obvious deformity. Pupils equal, round,

## 2025-03-04 NOTE — PROGRESS NOTES
Physician Progress Note      PATIENT:               REBA VILLELA  CSN #:                  [id number]  :                       1960  ADMIT DATE:       2025 7:22 PM  DISCH DATE:        2025 4:09 PM  RESPONDING  PROVIDER #:        Tr Dove MD          QUERY TEXT:    Pt admitted with chest pain.  ED note states, \"Patient reports at 1815 while   watching TV he developed central chest pain.  He also felt nauseous, short of   breath, and had epigastric pain.  EMS was called to dispense 4 baby aspirin,   nitro, and Zofran.  These essentially relieved his symptoms.\"  H&P states,   \"Atypical chest pain/USA...Pain completely resolved on arrival to ER after ASA   and SL NTG given.\"   Cardiology note states, \"Typical chest pain.  Pain   alleviated with nitroglycerin, worse with exertion.  Intermittent complaint,   documented in the past...EKG was reviewed,    The medical record reflects the following:  Risk Factors: anxiety/panic attacks, uncontrolled HTN, age 65, nonobstructive   CAD, GERD, orthostatic hypotension  Clinical Indicators: Pt admitted with chest pain.  ED note states, \"Patient   reports at 1815 while watching TV he developed central chest pain.  He also   felt nauseous, short of breath, and had epigastric pain.  EMS was called to   dispense 4 baby aspirin, nitro, and Zofran.  These essentially relieved his   symptoms.\"  H&P states, \"Atypical chest pain/USA...Pain completely resolved on   arrival to ER after ASA and SL NTG given.\"   Cardiology note states,   \"Typical chest pain.  Pain alleviated with nitroglycerin, worse with exertion.    Intermittent complaint, documented in the pas  Treatment: ASA, Plavix, Imdur, Protonix, statin, Carafate, Tylenol, Zofran,   nitro, Cardiology consult, EKG, ECHO, stress test, tele  Options provided:  -- Chest pain due to uncontrolled HTN  -- Chest pain due to anxiety  -- Chest pain due to GERD  -- Chest pain due to, Please specify cause  -- Other  - I will add my own diagnosis  -- Disagree - Not applicable / Not valid  -- Disagree - Clinically unable to determine / Unknown  -- Refer to Clinical Documentation Reviewer    PROVIDER RESPONSE TEXT:    This patient had chest pain due to uncontrolled HTN    Query created by: Ashley Jha on 3/3/2025 10:01 AM      QUERY TEXT:    Patient admitted with atypical chest pain.  Pt noted to have BPH and was   treated with Flomax. ?If possible, please document in progress notes and   discharge summary if you are evaluating and/or treating any of the following:    The medical record reflects the following:  Risk Factors: 65 yr. old male  Clinical Indicators: BPH  Treatment: Flomax  Options provided:  -- BPH with partial/complete urinary obstruction  -- BPH with urinary retention without obstruction  -- Other - I will add my own diagnosis  -- Disagree - Not applicable / Not valid  -- Disagree - Clinically unable to determine / Unknown  -- Refer to Clinical Documentation Reviewer    PROVIDER RESPONSE TEXT:    This patient has BPH with partial/complete urinary obstruction.    Query created by: Ashley Jha on 2/28/2025 12:01 PM      Electronically signed by:  Tr Dove MD 3/3/2025 8:41 PM

## 2025-03-09 NOTE — PROGRESS NOTES
Physician Progress Note      PATIENT:               REBA VILLELA  CSN #:                  [id number]  :                       1960  ADMIT DATE:       2025 7:22 PM  DISCH DATE:        2025 4:09 PM  RESPONDING  PROVIDER #:        Tr Dove MD          QUERY TEXT:    Pt admitted with chest pain due to \"uncontrolled\" HTN.  Pt noted to have   systolic BP as high as 170s.  ED note states, \"Patient reports at 1815 while   watching TV he developed central chest pain.  He also felt nauseous, short of   breath, and had epigastric pain.  EMS was called to dispense 4 baby aspirin,   nitro, and Zofran.  These essentially relieved his symptoms.\"  If possible,   please document in progress notes and discharge summary if you are evaluating   and/or treating any of the following:    The medical record reflects the following:  Risk Factors: anxiety/panic attacks, age 65  Clinical Indicators: Pt admitted with chest pain due to \"uncontrolled\" HTN.    Pt noted to have systolic BP as high as 170s.  ED note states, \"Patient   reports at 1815 while watching TV he developed central chest pain.  He also   felt nauseous, short of breath, and had epigastric pain.  EMS was called to   dispense 4 baby aspirin, nitro, and Zofran.  These essentially relieved his   symptoms.\"  Treatment: nitro per EMS, Imdur, Cardiology consult, VS monitoring    Hypertensive Urgency: Defined as SBP of >180 OR DBP >120 w/o associated organ   damage. S/s may or may not be present, but can include severe headache, SOB,   epistaxis, severe anxiety. Tx: adjustment of oral BP medication; IV meds not   usually required.    Hypertensive Emergency: SBP of >180 OR DBP >120 w/ associated organ damage   (CVA, MI, acute CHF, ELSI, encephalopathy, pulmonary edema, and unstable   angina). Documentation should include specific organ affected.  Requires   immediate treatment, usually with IV meds.    Hypertensive Crisis, unspecified: SBP of > 180 OR DBP >  120 and includes   damage to blood vessels, including inflammation, leakage of fluid or blood and   can cause stroke, headache, heart failure and eclampsia.  Source: [employer] MS-DRG Training Guide and Quick Reference Guide  Options provided:  -- Hypertensive Crisis  -- Hypertensive Emergency  -- Hypertensive Urgency  -- Other - I will add my own diagnosis  -- Disagree - Not applicable / Not valid  -- Disagree - Clinically unable to determine / Unknown  -- Refer to Clinical Documentation Reviewer    PROVIDER RESPONSE TEXT:    This patient has hypertensive urgency.    Query created by: Ashley Jha on 3/6/2025 11:33 AM      Electronically signed by:  Tr Dove MD 3/8/2025 10:10 PM

## 2025-03-24 NOTE — PROGRESS NOTES
[facility] [place] [facility]  [facility] CARDIOLOGY  [address]  [address]  [place] [address]  Dept: [phone]  Dept Fax: [phone]  Loc: [phone]    Visit Date: 3/27/2025    Luis Enrique Valdes is a 65 y.o. male who presents todayfor:  Chief Complaint   Patient presents with    Follow-up     3 week      Cardiologist: [place] of HFpEF, non obs CAD, [place]    HPI: hfu for [place], cp, negative stress test  He has been feeling better. Gets dizziness occasionally still. Takes it easy. Does not wear compression hose or binder frequently. May start more frequently as he gets more active outside as the weather warms up. Not much chest pain. No sob. Appetite is okay, not sleeping the best lately.   Past Surgical History:   Procedure Laterality Date    ABDOMEN SURGERY      CARDIAC PROCEDURE N/A 11/29/2024    Left heart cath / coronary angiography performed by Gaston French MD at [facility] CARDIAC CATH LAB    CARDIAC SURGERY      Heart Cath    CHOLECYSTECTOMY  2002    CHOLECYSTECTOMY, LAPAROSCOPIC      COLONOSCOPY      COLONOSCOPY      DILATATION, ESOPHAGUS      ENDOSCOPY, COLON, DIAGNOSTIC      ESOPHAGEAL DILATATION      KNEE ARTHROSCOPY Left 1984    NECK SURGERY  1996    plate in neck and fusion    SIGMOIDOSCOPY Left 6/30/2020    SIGMOIDOSCOPY DIAGNOSTIC FLEXIBLE performed by Magdi Aaron MD at [facility] Endoscopy    [facility]  2/11/2016    UPPER GASTROINTESTINAL ENDOSCOPY       Family History   Problem Relation Age of Onset    Cancer Mother     Diabetes Mother     Heart Disease Mother     High Blood Pressure Mother     High Cholesterol Mother     Arthritis Father     Heart Disease Father     Cancer Father     Diabetes Father     Substance Abuse Father     Depression Father     Obesity Brother     Asthma Brother     Mental Retardation Brother     High Blood Pressure Sister     High Cholesterol Sister      Social History     Tobacco Use    Smoking status: Never    Smokeless tobacco: Never   Substance Use

## 2025-03-27 ENCOUNTER — OFFICE VISIT (OUTPATIENT)
Dept: CARDIOLOGY CLINIC | Age: 65
End: 2025-03-27
Payer: MEDICARE

## 2025-03-27 VITALS
DIASTOLIC BLOOD PRESSURE: 75 MMHG | HEIGHT: 72 IN | BODY MASS INDEX: 23.86 KG/M2 | WEIGHT: 176.2 LBS | HEART RATE: 63 BPM | SYSTOLIC BLOOD PRESSURE: 137 MMHG

## 2025-03-27 DIAGNOSIS — I95.1 ORTHOSTATIC HYPOTENSION: Primary | ICD-10-CM

## 2025-03-27 DIAGNOSIS — I50.32 CHRONIC DIASTOLIC CHF (CONGESTIVE HEART FAILURE) (HCC): ICD-10-CM

## 2025-03-27 DIAGNOSIS — I25.10 CORONARY ARTERY DISEASE INVOLVING NATIVE CORONARY ARTERY OF NATIVE HEART WITHOUT ANGINA PECTORIS: ICD-10-CM

## 2025-03-27 PROCEDURE — 99214 OFFICE O/P EST MOD 30 MIN: CPT | Performed by: STUDENT IN AN ORGANIZED HEALTH CARE EDUCATION/TRAINING PROGRAM

## 2025-03-27 PROCEDURE — 1123F ACP DISCUSS/DSCN MKR DOCD: CPT | Performed by: STUDENT IN AN ORGANIZED HEALTH CARE EDUCATION/TRAINING PROGRAM

## 2025-03-27 PROCEDURE — 3075F SYST BP GE 130 - 139MM HG: CPT | Performed by: STUDENT IN AN ORGANIZED HEALTH CARE EDUCATION/TRAINING PROGRAM

## 2025-03-27 PROCEDURE — 1159F MED LIST DOCD IN RCRD: CPT | Performed by: STUDENT IN AN ORGANIZED HEALTH CARE EDUCATION/TRAINING PROGRAM

## 2025-03-27 PROCEDURE — 3078F DIAST BP <80 MM HG: CPT | Performed by: STUDENT IN AN ORGANIZED HEALTH CARE EDUCATION/TRAINING PROGRAM

## (undated) DEVICE — CATHETER ETER IV 22GA L1IN POLYUR STR RADPQ INTROCAN SFTY

## (undated) DEVICE — BAND COMPR L24CM REG CLR PLAS HEMSTAT EXT HK AND LOOP RETEN

## (undated) DEVICE — IV START KIT: Brand: MEDLINE INDUSTRIES, INC.

## (undated) DEVICE — CATHETER DIAG 5FR L100CM LUMN ID0.047IN JR4 CRV 0 SIDE H

## (undated) DEVICE — TIBURON NEONATAL DRAPE: Brand: CONVERTORS

## (undated) DEVICE — GLIDESHEATH SLENDER NITINOL HYDROPHILIC COATED INTRODUCER SHEATH: Brand: GLIDESHEATH SLENDER

## (undated) DEVICE — SOLUTION IV 1000ML 0.45% SOD CHL PH 5 INJ USP VIAFLX PLAS

## (undated) DEVICE — DRAPE KIT RAMAPR RADIATION SHIELD

## (undated) DEVICE — TUBING IV STOPCOCK 48 CM 3 W

## (undated) DEVICE — GUIDEWIRE VASC L260CM DIA0.035IN RAD 3MM J TIP L7CM PTFE

## (undated) DEVICE — CATHETER 5FR CORDIS PIG 145DEG 110CM

## (undated) DEVICE — CATHETER DIAG 5FR L100CM LUMN ID0.047IN JL3.5 CRV 0 SIDE H

## (undated) DEVICE — SET ADMIN 25ML L117IN PMP MOD CK VLV RLER CLMP 2 SMRTSITE